# Patient Record
Sex: MALE | HISPANIC OR LATINO | Employment: FULL TIME
[De-identification: names, ages, dates, MRNs, and addresses within clinical notes are randomized per-mention and may not be internally consistent; named-entity substitution may affect disease eponyms.]

---

## 2019-09-17 ENCOUNTER — RECORDS - HEALTHEAST (OUTPATIENT)
Dept: ADMINISTRATIVE | Facility: OTHER | Age: 49
End: 2019-09-17

## 2019-09-17 LAB
CHOLEST SERPL-MCNC: 134 MG/DL
HDLC SERPL-MCNC: 39 MG/DL
LDLC SERPL CALC-MCNC: 76 MG/DL
TRIGLYCERIDES (HISTORICAL CONVERSION): 103 MG/DL

## 2020-02-05 ENCOUNTER — RECORDS - HEALTHEAST (OUTPATIENT)
Dept: ADMINISTRATIVE | Facility: OTHER | Age: 50
End: 2020-02-05

## 2020-02-05 ENCOUNTER — AMBULATORY - HEALTHEAST (OUTPATIENT)
Dept: ADMINISTRATIVE | Facility: CLINIC | Age: 50
End: 2020-02-05

## 2020-02-05 DIAGNOSIS — G62.9 PERIPHERAL NEUROPATHY: ICD-10-CM

## 2020-02-10 ENCOUNTER — RECORDS - HEALTHEAST (OUTPATIENT)
Dept: HEALTH INFORMATION MANAGEMENT | Facility: CLINIC | Age: 50
End: 2020-02-10

## 2020-02-19 ENCOUNTER — OFFICE VISIT - HEALTHEAST (OUTPATIENT)
Dept: PODIATRY | Facility: CLINIC | Age: 50
End: 2020-02-19

## 2020-02-19 DIAGNOSIS — G58.9 COMPRESSION NEUROPATHY: ICD-10-CM

## 2020-02-19 DIAGNOSIS — G57.53 TARSAL TUNNEL SYNDROME OF BOTH LOWER EXTREMITIES: ICD-10-CM

## 2020-02-19 RX ORDER — ATORVASTATIN CALCIUM 10 MG/1
10 TABLET, FILM COATED ORAL AT BEDTIME
Status: SHIPPED | COMMUNITY
Start: 2020-02-19 | End: 2024-08-10

## 2020-02-26 ENCOUNTER — HOSPITAL ENCOUNTER (OUTPATIENT)
Dept: PHYSICAL MEDICINE AND REHAB | Facility: CLINIC | Age: 50
Discharge: HOME | End: 2020-02-26
Attending: PODIATRIST

## 2020-02-26 DIAGNOSIS — G57.53 TARSAL TUNNEL SYNDROME OF BOTH LOWER EXTREMITIES: ICD-10-CM

## 2020-02-26 DIAGNOSIS — G58.9 COMPRESSION NEUROPATHY: ICD-10-CM

## 2020-02-27 ENCOUNTER — RECORDS - HEALTHEAST (OUTPATIENT)
Dept: ADMINISTRATIVE | Facility: OTHER | Age: 50
End: 2020-02-27

## 2021-05-27 VITALS — TEMPERATURE: 98.6 F | DIASTOLIC BLOOD PRESSURE: 81 MMHG | SYSTOLIC BLOOD PRESSURE: 133 MMHG | HEART RATE: 68 BPM

## 2021-06-06 NOTE — PROGRESS NOTES
Patient presents at the request of Dr. Dante Robbins for bilateral lower extremity EMG.  He has bilateral foot pain right equal to left with numbness and tingling over the top more than the bottom of the feet.  His feet are hot/warm.  This is been present for 3 years.  3-year history of diabetes mellitus.    EMG/NCS  results: Please see scanned full report.    Comment NCS: Abnormal study  1.  Absent bilateral lower extremity SNAPs.  2.  Borderline amplitudes bilateral lower extremity CMAPs with generalized slowed conduction velocities.  Normal distal latencies.  3.  Normal right radial SNAP.  4.  Normal right ulnar CMAP distal latencies and amplitudes with generalized borderline conduction velocity.    Comment EMG: Abnormal study  1.  Mild increased insertional activity bilateral medial gastrocnemius muscles without sustained positive waves or fibrillation potentials.  Remainder bilateral lower extremity needle EMG normal    Interpretation: Abnormal study: There is electrodiagnostic evidence of:    1.  Length dependent sensorimotor polyneuropathy, predominantly axonal.  This appears to mildly affect the upper extremity motor conduction velocities and more significantly the lower extremity motor conduction velocities and sensory fibers.   These findings would be consistent with his history of diabetes mellitus.    2. There is no electrodiagnostic evidence of lumbosacral radiculopathy, lumbosacral plexopathy, or focal neuropathy in the bilateral lower extremities.  Specifically, there is no confirmatory electrodiagnostic evidence of peroneal neuropathy at the fibular neck or tibial neuropathy at the tarsal tunnel in the bilateral lower extremities.    The testing was completed in its entirety by the physician.      It was our pleasure caring for your patient today, if there any questions or concerns please do not hesitate to contact us.

## 2021-06-06 NOTE — PROGRESS NOTES
FOOT AND ANKLE SURGERY/PODIATRY CONSULT NOTE         ASSESSMENT:   Tarsal tunnel bilaterally  Compression neuropathy both lower extremities      TREATMENT:  The patient has been referred to neurology for EMGs and nerve conduction studies.  I informed the patient he may require surgical intervention in an attempt to alleviate his symptoms.  I recommended a tarsal tunnel release with nerve decompression and external neurolysis of the medial and lateral plantar nerves as well as the calcaneal nerve.  I have also recommended nerve decompression with external neurolysis of the deep peroneal nerve of both feet.  I informed the patient I estimate that he would have an 80% chance of significant reduction of his symptoms if not complete relief of his symptoms.  He was told that these procedures are performed on an outpatient basis under general anesthesia.  He was told that 1 foot would be done at a time.  After 3 weeks of the sutures will be removed and he would be able to return to normal shoe gear.  He was told the procedure takes approximately 45 minutes to perform.  He would then be discharged weightbearing.  The patient is to return to the clinic in 1 week to discuss the findings of his EMG.        HPI: I was asked to see Petar Fermin today for evaluation and treatment of severe pain involving both feet.  The patient indicated that for the past 3 years he has had severe burning and shooting pains involving both feet.  The patient stated that he has to place his feet in cold water in an attempt to alleviate his burning.  He also has tingling on the bottom of both feet which is exacerbated at night.  He has difficulty sleeping.  He has tried gabapentin and Lyrica with very little relief.  He denies any trauma to his lower back.  He he has not had any trauma to his lower extremities.  There are no factors which relieve his pain.  He is extremely frustrated at the inability to alleviate the severe burning and shooting  pains.      History reviewed. No pertinent past medical history.    History reviewed. No pertinent surgical history.    No Known Allergies      Current Outpatient Medications:      atorvastatin (LIPITOR) 10 MG tablet, Take 10 mg by mouth at bedtime., Disp: , Rfl:      metFORMIN (GLUCOPHAGE) 1000 MG tablet, Take 1,000 mg by mouth 2 (two) times a day with meals., Disp: , Rfl:     History reviewed. No pertinent family history.    Social History     Socioeconomic History     Marital status:      Spouse name: Not on file     Number of children: Not on file     Years of education: Not on file     Highest education level: Not on file   Occupational History     Not on file   Social Needs     Financial resource strain: Not on file     Food insecurity:     Worry: Not on file     Inability: Not on file     Transportation needs:     Medical: Not on file     Non-medical: Not on file   Tobacco Use     Smoking status: Never Smoker     Smokeless tobacco: Never Used   Substance and Sexual Activity     Alcohol use: Not on file     Drug use: Not on file     Sexual activity: Not on file   Lifestyle     Physical activity:     Days per week: Not on file     Minutes per session: Not on file     Stress: Not on file   Relationships     Social connections:     Talks on phone: Not on file     Gets together: Not on file     Attends Church service: Not on file     Active member of club or organization: Not on file     Attends meetings of clubs or organizations: Not on file     Relationship status: Not on file     Intimate partner violence:     Fear of current or ex partner: Not on file     Emotionally abused: Not on file     Physically abused: Not on file     Forced sexual activity: Not on file   Other Topics Concern     Not on file   Social History Narrative     Not on file       Review of Systems - Patient denies fever, chills, rash, wound, stiffness, limping, numbness, weakness, heart burn, blood in stool, chest pain with activity,  calf pain when walking, shortness of breath with activity, chronic cough, easy bleeding/bruising, swelling of ankles, excessive thirst, fatigue, depression, anxiety.  Patient admits to severe burning and tingling and some numbness involving both feet.      OBJECTIVE:  Appearance: alert, well appearing, and in no distress.    Vitals:    02/19/20 0853   BP: 133/81   Pulse: 68   Temp: 98.6  F (37  C)       BMI= There is no height or weight on file to calculate BMI.    General appearance: Patient is alert and fully cooperative with history & exam.  No sign of distress is noted during the visit.  Psychiatric: Affect is pleasant & appropriate.  Patient appears motivated to improve health.  Respiratory: Breathing is regular & unlabored while sitting.  HEENT: Hearing is intact to spoken word.  Speech is clear.  No gross evidence of visual impairment that would impact ambulation.    Vascular: Dorsalis pedis and posterior tibial pulses are palpable. There is no pedal hair growth bilaterally.  CFT < 3 sec from anterior tibial surface to distal digits bilaterally. There is no appreciable edema noted.  Dermatologic: Turgor and texture are within normal limits. No coloration or temperature changes. No primary or secondary lesions noted.  Neurologic: All epicritic and proprioceptive sensations are grossly diminished bilaterally.  There is a very positive Tinel sign when percussing the tarsal tunnel and deep peroneal nerve bilaterally.  Musculoskeletal: All active and passive ankle, subtalar, midtarsal, and 1st MPJ range of motion are grossly intact without pain or crepitus, with the exception of none. Manual muscle strength is within normal limits bilaterally. All dorsiflexors, plantarflexors, invertors, evertors are intact bilaterally. Tenderness present to plantar aspect both feet on palpation.  No tenderness to feet or ankles with range of motion. Calf is soft/non-tender without warmth/induration    Imaging:     No results  found.             Dante Robbins; SILVIA  [facility] Foot & Ankle Surgery/Podiatry

## 2021-06-06 NOTE — PATIENT INSTRUCTIONS - HE
Thank you for choosing the [facility] for your EMG testing.    The ordering provider will receive your final EMG results within the next few days.  Please follow up with your provider for the results and further treatment recommendations.

## 2024-08-10 ENCOUNTER — APPOINTMENT (OUTPATIENT)
Dept: MRI IMAGING | Facility: HOSPITAL | Age: 54
DRG: 629 | End: 2024-08-10
Attending: HOSPITALIST

## 2024-08-10 ENCOUNTER — APPOINTMENT (OUTPATIENT)
Dept: RADIOLOGY | Facility: HOSPITAL | Age: 54
DRG: 629 | End: 2024-08-10
Attending: EMERGENCY MEDICINE

## 2024-08-10 ENCOUNTER — HOSPITAL ENCOUNTER (INPATIENT)
Facility: HOSPITAL | Age: 54
LOS: 5 days | Discharge: HOME | DRG: 629 | End: 2024-08-15
Attending: EMERGENCY MEDICINE | Admitting: HOSPITALIST

## 2024-08-10 ENCOUNTER — OFFICE VISIT (OUTPATIENT)
Dept: FAMILY MEDICINE | Facility: CLINIC | Age: 54
End: 2024-08-10

## 2024-08-10 VITALS
HEART RATE: 70 BPM | OXYGEN SATURATION: 99 % | TEMPERATURE: 98.2 F | SYSTOLIC BLOOD PRESSURE: 135 MMHG | RESPIRATION RATE: 18 BRPM | WEIGHT: 168.5 LBS | DIASTOLIC BLOOD PRESSURE: 82 MMHG

## 2024-08-10 DIAGNOSIS — L02.612 ABSCESS, TOE, LEFT: Primary | ICD-10-CM

## 2024-08-10 DIAGNOSIS — Z86.39 HISTORY OF DIABETES MELLITUS: ICD-10-CM

## 2024-08-10 DIAGNOSIS — L08.9 TOE INFECTION: ICD-10-CM

## 2024-08-10 DIAGNOSIS — N52.1 ERECTILE DYSFUNCTION DUE TO DISEASES CLASSIFIED ELSEWHERE: ICD-10-CM

## 2024-08-10 DIAGNOSIS — G44.86 CERVICOGENIC HEADACHE: ICD-10-CM

## 2024-08-10 DIAGNOSIS — E11.65 TYPE 2 DIABETES MELLITUS WITH HYPERGLYCEMIA, WITHOUT LONG-TERM CURRENT USE OF INSULIN (H): ICD-10-CM

## 2024-08-10 DIAGNOSIS — M51.26 DISPLACEMENT OF LUMBAR INTERVERTEBRAL DISC WITHOUT MYELOPATHY: ICD-10-CM

## 2024-08-10 DIAGNOSIS — M86.9 TOE OSTEOMYELITIS (H): Primary | ICD-10-CM

## 2024-08-10 DIAGNOSIS — L03.116 CELLULITIS OF LEFT FOOT: ICD-10-CM

## 2024-08-10 LAB
ANION GAP SERPL CALCULATED.3IONS-SCNC: 10 MMOL/L (ref 7–15)
BASOPHILS # BLD AUTO: 0 10E3/UL (ref 0–0.2)
BASOPHILS NFR BLD AUTO: 0 %
BUN SERPL-MCNC: 13.8 MG/DL (ref 6–20)
CALCIUM SERPL-MCNC: 8.6 MG/DL (ref 8.8–10.4)
CHLORIDE SERPL-SCNC: 98 MMOL/L (ref 98–107)
CREAT SERPL-MCNC: 0.62 MG/DL (ref 0.67–1.17)
CRP SERPL-MCNC: 53 MG/L
EGFRCR SERPLBLD CKD-EPI 2021: >90 ML/MIN/1.73M2
EOSINOPHIL # BLD AUTO: 0.1 10E3/UL (ref 0–0.7)
EOSINOPHIL NFR BLD AUTO: 2 %
ERYTHROCYTE [DISTWIDTH] IN BLOOD BY AUTOMATED COUNT: 12.5 % (ref 10–15)
GLUCOSE BLDC GLUCOMTR-MCNC: 288 MG/DL (ref 70–99)
GLUCOSE BLDC GLUCOMTR-MCNC: 293 MG/DL (ref 70–99)
GLUCOSE BLDC GLUCOMTR-MCNC: 364 MG/DL (ref 70–99)
GLUCOSE SERPL-MCNC: 341 MG/DL (ref 70–99)
HBA1C MFR BLD: 13.3 %
HCO3 SERPL-SCNC: 28 MMOL/L (ref 22–29)
HCT VFR BLD AUTO: 41.5 % (ref 40–53)
HGB BLD-MCNC: 13.5 G/DL (ref 13.3–17.7)
HOLD SPECIMEN: NORMAL
HOLD SPECIMEN: NORMAL
IMM GRANULOCYTES # BLD: 0 10E3/UL
IMM GRANULOCYTES NFR BLD: 0 %
LACTATE SERPL-SCNC: 0.9 MMOL/L (ref 0.7–2)
LYMPHOCYTES # BLD AUTO: 1.3 10E3/UL (ref 0.8–5.3)
LYMPHOCYTES NFR BLD AUTO: 27 %
MCH RBC QN AUTO: 29.9 PG (ref 26.5–33)
MCHC RBC AUTO-ENTMCNC: 32.5 G/DL (ref 31.5–36.5)
MCV RBC AUTO: 92 FL (ref 78–100)
MONOCYTES # BLD AUTO: 0.9 10E3/UL (ref 0–1.3)
MONOCYTES NFR BLD AUTO: 18 %
NEUTROPHILS # BLD AUTO: 2.6 10E3/UL (ref 1.6–8.3)
NEUTROPHILS NFR BLD AUTO: 54 %
NRBC # BLD AUTO: 0 10E3/UL
NRBC BLD AUTO-RTO: 0 /100
PLATELET # BLD AUTO: 259 10E3/UL (ref 150–450)
POTASSIUM SERPL-SCNC: 4.5 MMOL/L (ref 3.4–5.3)
RBC # BLD AUTO: 4.51 10E6/UL (ref 4.4–5.9)
SODIUM SERPL-SCNC: 136 MMOL/L (ref 135–145)
WBC # BLD AUTO: 4.9 10E3/UL (ref 4–11)

## 2024-08-10 PROCEDURE — 99285 EMERGENCY DEPT VISIT HI MDM: CPT

## 2024-08-10 PROCEDURE — 255N000002 HC RX 255 OP 636: Performed by: HOSPITALIST

## 2024-08-10 PROCEDURE — 250N000011 HC RX IP 250 OP 636: Performed by: HOSPITALIST

## 2024-08-10 PROCEDURE — 83036 HEMOGLOBIN GLYCOSYLATED A1C: CPT | Performed by: HOSPITALIST

## 2024-08-10 PROCEDURE — 73630 X-RAY EXAM OF FOOT: CPT | Mod: LT

## 2024-08-10 PROCEDURE — 96374 THER/PROPH/DIAG INJ IV PUSH: CPT

## 2024-08-10 PROCEDURE — A9585 GADOBUTROL INJECTION: HCPCS | Performed by: HOSPITALIST

## 2024-08-10 PROCEDURE — 86140 C-REACTIVE PROTEIN: CPT | Performed by: EMERGENCY MEDICINE

## 2024-08-10 PROCEDURE — 250N000012 HC RX MED GY IP 250 OP 636 PS 637: Performed by: HOSPITALIST

## 2024-08-10 PROCEDURE — 258N000003 HC RX IP 258 OP 636

## 2024-08-10 PROCEDURE — 250N000013 HC RX MED GY IP 250 OP 250 PS 637: Performed by: HOSPITALIST

## 2024-08-10 PROCEDURE — 90471 IMMUNIZATION ADMIN: CPT | Performed by: EMERGENCY MEDICINE

## 2024-08-10 PROCEDURE — 80048 BASIC METABOLIC PNL TOTAL CA: CPT | Performed by: EMERGENCY MEDICINE

## 2024-08-10 PROCEDURE — 90715 TDAP VACCINE 7 YRS/> IM: CPT | Performed by: EMERGENCY MEDICINE

## 2024-08-10 PROCEDURE — 250N000011 HC RX IP 250 OP 636: Performed by: EMERGENCY MEDICINE

## 2024-08-10 PROCEDURE — 250N000011 HC RX IP 250 OP 636

## 2024-08-10 PROCEDURE — 85025 COMPLETE CBC W/AUTO DIFF WBC: CPT | Performed by: EMERGENCY MEDICINE

## 2024-08-10 PROCEDURE — 73720 MRI LWR EXTREMITY W/O&W/DYE: CPT | Mod: LT

## 2024-08-10 PROCEDURE — 36415 COLL VENOUS BLD VENIPUNCTURE: CPT | Performed by: EMERGENCY MEDICINE

## 2024-08-10 PROCEDURE — 87205 SMEAR GRAM STAIN: CPT | Performed by: EMERGENCY MEDICINE

## 2024-08-10 PROCEDURE — 99222 1ST HOSP IP/OBS MODERATE 55: CPT | Performed by: HOSPITALIST

## 2024-08-10 PROCEDURE — 87040 BLOOD CULTURE FOR BACTERIA: CPT | Performed by: EMERGENCY MEDICINE

## 2024-08-10 PROCEDURE — 99204 OFFICE O/P NEW MOD 45 MIN: CPT | Performed by: STUDENT IN AN ORGANIZED HEALTH CARE EDUCATION/TRAINING PROGRAM

## 2024-08-10 PROCEDURE — 83605 ASSAY OF LACTIC ACID: CPT | Performed by: EMERGENCY MEDICINE

## 2024-08-10 PROCEDURE — 120N000001 HC R&B MED SURG/OB

## 2024-08-10 RX ORDER — IBUPROFEN 200 MG
400 TABLET ORAL EVERY 6 HOURS PRN
COMMUNITY

## 2024-08-10 RX ORDER — CEFAZOLIN SODIUM 1 G/50ML
1250 SOLUTION INTRAVENOUS EVERY 12 HOURS
Status: DISCONTINUED | OUTPATIENT
Start: 2024-08-11 | End: 2024-08-12 | Stop reason: DRUGHIGH

## 2024-08-10 RX ORDER — CEFAZOLIN SODIUM 1 G/50ML
1750 SOLUTION INTRAVENOUS ONCE
Status: COMPLETED | OUTPATIENT
Start: 2024-08-10 | End: 2024-08-11

## 2024-08-10 RX ORDER — AMOXICILLIN 250 MG
1 CAPSULE ORAL 2 TIMES DAILY PRN
Status: DISCONTINUED | OUTPATIENT
Start: 2024-08-10 | End: 2024-08-15 | Stop reason: HOSPADM

## 2024-08-10 RX ORDER — CALCIUM CARBONATE 500 MG/1
1000 TABLET, CHEWABLE ORAL 4 TIMES DAILY PRN
Status: DISCONTINUED | OUTPATIENT
Start: 2024-08-10 | End: 2024-08-15 | Stop reason: HOSPADM

## 2024-08-10 RX ORDER — METFORMIN HCL 500 MG
2000 TABLET, EXTENDED RELEASE 24 HR ORAL
COMMUNITY
Start: 2023-11-09

## 2024-08-10 RX ORDER — GADOBUTROL 604.72 MG/ML
7 INJECTION INTRAVENOUS ONCE
Status: COMPLETED | OUTPATIENT
Start: 2024-08-10 | End: 2024-08-10

## 2024-08-10 RX ORDER — HYDROMORPHONE HYDROCHLORIDE 2 MG/1
2 TABLET ORAL EVERY 4 HOURS PRN
Status: DISCONTINUED | OUTPATIENT
Start: 2024-08-10 | End: 2024-08-15 | Stop reason: HOSPADM

## 2024-08-10 RX ORDER — ACETAMINOPHEN 650 MG/1
650 SUPPOSITORY RECTAL EVERY 4 HOURS PRN
Status: DISCONTINUED | OUTPATIENT
Start: 2024-08-10 | End: 2024-08-15 | Stop reason: HOSPADM

## 2024-08-10 RX ORDER — NALOXONE HYDROCHLORIDE 0.4 MG/ML
0.2 INJECTION, SOLUTION INTRAMUSCULAR; INTRAVENOUS; SUBCUTANEOUS
Status: DISCONTINUED | OUTPATIENT
Start: 2024-08-10 | End: 2024-08-15 | Stop reason: HOSPADM

## 2024-08-10 RX ORDER — NALOXONE HYDROCHLORIDE 0.4 MG/ML
0.4 INJECTION, SOLUTION INTRAMUSCULAR; INTRAVENOUS; SUBCUTANEOUS
Status: DISCONTINUED | OUTPATIENT
Start: 2024-08-10 | End: 2024-08-15 | Stop reason: HOSPADM

## 2024-08-10 RX ORDER — PIPERACILLIN SODIUM, TAZOBACTAM SODIUM 3; .375 G/15ML; G/15ML
3.38 INJECTION, POWDER, LYOPHILIZED, FOR SOLUTION INTRAVENOUS EVERY 8 HOURS
Status: DISCONTINUED | OUTPATIENT
Start: 2024-08-10 | End: 2024-08-13

## 2024-08-10 RX ORDER — NICOTINE POLACRILEX 4 MG
15-30 LOZENGE BUCCAL
Status: DISCONTINUED | OUTPATIENT
Start: 2024-08-10 | End: 2024-08-15 | Stop reason: HOSPADM

## 2024-08-10 RX ORDER — ACETAMINOPHEN 325 MG/1
650 TABLET ORAL EVERY 4 HOURS PRN
Status: DISCONTINUED | OUTPATIENT
Start: 2024-08-10 | End: 2024-08-15 | Stop reason: HOSPADM

## 2024-08-10 RX ORDER — LIDOCAINE 40 MG/G
CREAM TOPICAL
Status: DISCONTINUED | OUTPATIENT
Start: 2024-08-10 | End: 2024-08-15 | Stop reason: HOSPADM

## 2024-08-10 RX ORDER — POLYETHYLENE GLYCOL 3350 17 G/17G
17 POWDER, FOR SOLUTION ORAL 2 TIMES DAILY PRN
Status: DISCONTINUED | OUTPATIENT
Start: 2024-08-10 | End: 2024-08-15 | Stop reason: HOSPADM

## 2024-08-10 RX ORDER — ATORVASTATIN CALCIUM 10 MG/1
10 TABLET, FILM COATED ORAL EVERY EVENING
Status: DISCONTINUED | OUTPATIENT
Start: 2024-08-10 | End: 2024-08-15 | Stop reason: HOSPADM

## 2024-08-10 RX ORDER — AMOXICILLIN 250 MG
2 CAPSULE ORAL 2 TIMES DAILY PRN
Status: DISCONTINUED | OUTPATIENT
Start: 2024-08-10 | End: 2024-08-15 | Stop reason: HOSPADM

## 2024-08-10 RX ORDER — DEXTROSE MONOHYDRATE 25 G/50ML
25-50 INJECTION, SOLUTION INTRAVENOUS
Status: DISCONTINUED | OUTPATIENT
Start: 2024-08-10 | End: 2024-08-15 | Stop reason: HOSPADM

## 2024-08-10 RX ORDER — GABAPENTIN 100 MG/1
100 CAPSULE ORAL 3 TIMES DAILY
Status: DISCONTINUED | OUTPATIENT
Start: 2024-08-10 | End: 2024-08-15 | Stop reason: HOSPADM

## 2024-08-10 RX ORDER — PIPERACILLIN SODIUM, TAZOBACTAM SODIUM 3; .375 G/15ML; G/15ML
3.38 INJECTION, POWDER, LYOPHILIZED, FOR SOLUTION INTRAVENOUS ONCE
Status: COMPLETED | OUTPATIENT
Start: 2024-08-10 | End: 2024-08-10

## 2024-08-10 RX ADMIN — GABAPENTIN 100 MG: 100 CAPSULE ORAL at 14:09

## 2024-08-10 RX ADMIN — CLOSTRIDIUM TETANI TOXOID ANTIGEN (FORMALDEHYDE INACTIVATED), CORYNEBACTERIUM DIPHTHERIAE TOXOID ANTIGEN (FORMALDEHYDE INACTIVATED), BORDETELLA PERTUSSIS TOXOID ANTIGEN (GLUTARALDEHYDE INACTIVATED), BORDETELLA PERTUSSIS FILAMENTOUS HEMAGGLUTININ ANTIGEN (FORMALDEHYDE INACTIVATED), BORDETELLA PERTUSSIS PERTACTIN ANTIGEN, AND BORDETELLA PERTUSSIS FIMBRIAE 2/3 ANTIGEN 0.5 ML: 5; 2; 2.5; 5; 3; 5 INJECTION, SUSPENSION INTRAMUSCULAR at 13:02

## 2024-08-10 RX ADMIN — PIPERACILLIN AND TAZOBACTAM 3.38 G: 3; .375 INJECTION, POWDER, FOR SOLUTION INTRAVENOUS at 18:27

## 2024-08-10 RX ADMIN — ATORVASTATIN CALCIUM 10 MG: 10 TABLET, FILM COATED ORAL at 21:13

## 2024-08-10 RX ADMIN — GADOBUTROL 7 ML: 604.72 INJECTION INTRAVENOUS at 18:05

## 2024-08-10 RX ADMIN — PIPERACILLIN AND TAZOBACTAM 3.38 G: 3; .375 INJECTION, POWDER, FOR SOLUTION INTRAVENOUS at 13:02

## 2024-08-10 RX ADMIN — INSULIN ASPART 5 UNITS: 100 INJECTION, SOLUTION INTRAVENOUS; SUBCUTANEOUS at 18:24

## 2024-08-10 RX ADMIN — INSULIN ASPART 4 UNITS: 100 INJECTION, SOLUTION INTRAVENOUS; SUBCUTANEOUS at 14:16

## 2024-08-10 RX ADMIN — SODIUM CHLORIDE 1750 MG: 9 INJECTION, SOLUTION INTRAVENOUS at 22:07

## 2024-08-10 RX ADMIN — GABAPENTIN 100 MG: 100 CAPSULE ORAL at 21:13

## 2024-08-10 ASSESSMENT — ENCOUNTER SYMPTOMS
COLOR CHANGE: 1
WOUND: 1

## 2024-08-10 ASSESSMENT — ACTIVITIES OF DAILY LIVING (ADL)
ADLS_ACUITY_SCORE: 35
ADLS_ACUITY_SCORE: 20
ADLS_ACUITY_SCORE: 35
ADLS_ACUITY_SCORE: 20
ADLS_ACUITY_SCORE: 20
ADLS_ACUITY_SCORE: 35
ADLS_ACUITY_SCORE: 35
DEPENDENT_IADLS:: INDEPENDENT
ADLS_ACUITY_SCORE: 20
ADLS_ACUITY_SCORE: 20
ADLS_ACUITY_SCORE: 35
ADLS_ACUITY_SCORE: 35

## 2024-08-10 ASSESSMENT — COLUMBIA-SUICIDE SEVERITY RATING SCALE - C-SSRS
1. IN THE PAST MONTH, HAVE YOU WISHED YOU WERE DEAD OR WISHED YOU COULD GO TO SLEEP AND NOT WAKE UP?: NO
6. HAVE YOU EVER DONE ANYTHING, STARTED TO DO ANYTHING, OR PREPARED TO DO ANYTHING TO END YOUR LIFE?: NO
2. HAVE YOU ACTUALLY HAD ANY THOUGHTS OF KILLING YOURSELF IN THE PAST MONTH?: NO

## 2024-08-10 NOTE — H&P
"[facility]    History and Physical - Hospitalist Service       Date of Admission:  8/10/2024    Assessment & Plan      Petar Fermin is a 54 year old male admitted on 8/10/2024. He is being admitted for left foot cellulitis.        #Left foot cellulitis, diabetic foot infection  -Duration of symptoms unclear as patient does not recall initial injury, infectious symptoms seem to have acutely worsened 3 days prior to admission when patient manipulated his wound  -Mild fevers but otherwise nontoxic on admission  -Left foot MRI ordered  -Podiatry consult  -Continue with Zosyn, consider adding Vanco if worsening  -Wound care consult  -Follow-up cultures    #Type 2 diabetes mellitus complicated by hyperglycemia and peripheral neuropathy  -A1c pending, blood sugar in the 300s on admission likely from infectious stress  -Routinely takes metformin, holding  -Moderate correctional scale ordered, titrate as needed    #Hyperlipidemia  -Resume usual atorvastatin            Diet: Combination Diet Regular Diet Adult  DVT Prophylaxis: Pneumatic Compression Devices  Martinez Catheter: Not present  Lines: None     Cardiac Monitoring: None  Code Status: Full Code    Clinically Significant Risk Factors Present on Admission                          # Overweight: Estimated body mass index is 27.96 kg/m  as calculated from the following:    Height as of this encounter: 1.651 m (5' 5\").    Weight as of this encounter: 76.2 kg (168 lb).                    Disposition Plan     Medically Ready for Discharge: Anticipated in 2-4 Days           Neel Hedrick MD  Hospitalist Service  [facility]  Securely message with [employer] (more info)  Text page via [address] Paging/Directory     ______________________________________________________________________    Chief Complaint   Foot redness    History is obtained from the patient  History is obtained from patient's son    History of Present Illness "   Petar Fermin is a 54 year old male who presented to the ER for foot redness.  He states 3 days ago he noticed some sort of wire in his left great toe, also noted some drainage, patient describes pulling this out.  Subsequently he noted increasing redness going up his foot, along with tactile fevers prompting presentation.  Denies having had previous bouts of infections like this.  Denies any belly pain, nausea, shortness of breath or chest pain.  States he has chronic numbness in his feet.    Past Medical History    Past Medical History:   Diagnosis Date    DM2 (diabetes mellitus, type 2) (H)        Past Surgical History   No past surgical history on file.    Prior to Admission Medications   Prior to Admission Medications   Prescriptions Last Dose Informant Patient Reported? Taking?   ibuprofen (ADVIL/MOTRIN) 200 MG tablet 8/10/2024 at am  Yes Yes   Sig: Take 400 mg by mouth every 6 hours as needed for pain   metFORMIN (GLUCOPHAGE XR) 500 MG 24 hr tablet 8/9/2024 at pm  Yes Yes   Sig: Take 2,000 mg by mouth daily (with dinner)      Facility-Administered Medications: None        Review of Systems    12 systems negative other than HPI.        Social History   I have reviewed this patient's social history and updated it with pertinent information if needed.  Social History     Tobacco Use    Smoking status: Never    Smokeless tobacco: Never         Family History           Allergies   No Known Allergies     Physical Exam   Vital Signs: Temp: 98.1  F (36.7  C)   BP: (!) 158/72 Pulse: 67   Resp: 18 SpO2: 99 % O2 Device: None (Room air)    Weight: 168 lbs 0 oz    Well-appearing, no acute distress, poor dentition, membranes moist, heart rate regular, lungs clear, abdomen soft, trace left lower extremity edema, left foot with demarcated area of erythema to forefoot, quarter sized ulceration at the tip of his left great toe without obvious purulence, no joint pains on palpation, normal affect and mood    Medical  Decision Making       64 MINUTES SPENT BY ME on the date of service doing chart review, history, exam, documentation & further activities per the note.  NOTE(S)/MEDICAL RECORDS REVIEWED over the past 24 hours: Vitals, labs, new imaging, documentation and medication administrations       Data     I have personally reviewed the following data over the past 24 hrs:    4.9  \   13.5   / 259     136 98 13.8 /  341 (H)   4.5 28 0.62 (L) \     Procal: N/A CRP: 53.00 (H) Lactic Acid: 0.9         Imaging results reviewed over the past 24 hrs:   Recent Results (from the past 24 hour(s))   Foot XR, G/E 3 views, left    Narrative    EXAM: XR FOOT LEFT G/E 3 VIEWS  LOCATION: [facility]  DATE: 8/10/2024    INDICATION: Swelling and redness. Evaluate for retained foreign body in the left big toe.  COMPARISON: None.      Impression    IMPRESSION: There is no evidence of an acute fracture with attention to the great toe. There is some soft tissue swelling and irregularity however there is no discrete radiopaque foreign body identified. No significant joint space narrowing. No cortical   destructive change to suggest osteomyelitis.

## 2024-08-10 NOTE — ED PROVIDER NOTES
EMERGENCY DEPARTMENT ENCOUNTER      NAME: Petar Fermin  AGE: 54 year old male  YOB: 1970  MRN: [id number]  EVALUATION DATE & TIME: 8/10/2024 12:13 PM    PCP: Karen Jimenez    ED PROVIDER: Breann Salomon M.D.      CHIEF COMPLAINT     Chief Complaint   Patient presents with    Left toe infection         FINAL IMPRESSION:     1. Cellulitis of left foot    2. Toe infection    3. History of diabetes mellitus          MEDICAL DECISION MAKING:       Pertinent Labs & Imaging studies reviewed. (See chart for details)    54 year old male presents to the Emergency Department for evaluation of foot infection    History  Supplemental history from urgent care and son  External Record(s) review as documented below    Exam  Alert.  Cooperative and pleasant.  Swelling of the dorsum of the left foot with purulent discharge from the left big toe.    Differential Diagnosis include but not limited to abscess osteo cellulitis among others.      Vital Signs: Reviewed  EKG: None  Imaging: I independently interpreted foot x-ray soft tissue defect.  No foreign body.Formal read by radiologist.  Home meds: Reviewed  ED meds: Zosyn  Fluids: Normal Saline  Labs:  K 4.5  Cr 0.62  Wbc 4.9  Hgb 13.5  platelets 259    Clinical Impression and Decision Making    54-year-old male diabetic neuropathy presents here from clinic for foot infection.  Few days ago he noticed that there was a wire on his big toe he removed that there was pus.  He denies any pain denies fevers chills.    Exam purulent discharge from the left big toe and erythema of the dorsum of the foot.    Labs revealed normal white blood cell count normal lactic acid.  Gram stain pending.  Blood cultures done I do not suspect endocarditis therefore only 1 set was done.  Patient started on broad-spectrum antibiotics.    Patient denied any pain.    Spoke with hospitalist who accepts patient will order a formal podiatry consult.    Admitted in stable condition with  guarded diagnosis.    In addition to the work out documented, I considered the following work up incision and drainage purulent discharge already draining his diabetic will need formal surgical evaluation.           Medical Decision Making    History:  Supplemental history from: Documented in chart  External Record(s) reviewed: Documented in chart    Work Up:  Chart documentation includes differential considered and any EKGs or imaging independently interpreted by provider, where specified.  In additional to work up documented, I considered the following work up: Documented in chart, if applicable.    External consultation:  Discussion of management with another provider: Documented in chart, if applicable    Complicating factors:  Care impacted by chronic illness: Diabetes  Care affected by social determinants of health: N/A    Disposition considerations: Admit.      Review of External Records  Hospital/Clinic: [facility]  Date: 8/10/24  Type 2 diabetes      External Consultation  Dr. Hedrick, Hospitalist      ED COURSE     12:30 PM I met with the patient and his son for the initial interview and physical examination. Discussed plan for treatment and workup in the ED.    1:08 PM I discussed the case with hospitalist, Dr Hedrick, who accepts the patient for admission.  1:41 PM I rechecked and updated the patient and his son on plan for admission.    At the conclusion of the encounter I discussed the results of all of the tests and the disposition. The questions were answered. The patient and his son acknowledged understanding and was agreeable with the care plan.         MEDICATIONS GIVEN IN THE EMERGENCY:     Medications   lidocaine 1 % 0.1-1 mL (has no administration in time range)   lidocaine (LMX4) cream (has no administration in time range)   sodium chloride (PF) 0.9% PF flush 3 mL (3 mLs Intracatheter $Given 8/10/24 1410)   sodium chloride (PF) 0.9% PF flush 3 mL (has no administration in time range)    senna-docusate (SENOKOT-S/PERICOLACE) 8.6-50 MG per tablet 1 tablet (has no administration in time range)     Or   senna-docusate (SENOKOT-S/PERICOLACE) 8.6-50 MG per tablet 2 tablet (has no administration in time range)   calcium carbonate (TUMS) chewable tablet 1,000 mg (has no administration in time range)   acetaminophen (TYLENOL) tablet 650 mg (has no administration in time range)     Or   acetaminophen (TYLENOL) Suppository 650 mg (has no administration in time range)   gabapentin (NEURONTIN) capsule 100 mg (100 mg Oral $Given 8/10/24 1409)   HYDROmorphone (DILAUDID) half-tab 1 mg (has no administration in time range)   HYDROmorphone (DILAUDID) tablet 2 mg (has no administration in time range)   polyethylene glycol (MIRALAX) Packet 17 g (has no administration in time range)   glucose gel 15-30 g (has no administration in time range)     Or   dextrose 50 % injection 25-50 mL (has no administration in time range)     Or   glucagon injection 1 mg (has no administration in time range)   insulin aspart (NovoLOG) injection (RAPID ACTING) (4 Units Subcutaneous $Given 8/10/24 1416)   insulin aspart (NovoLOG) injection (RAPID ACTING) (has no administration in time range)   naloxone (NARCAN) injection 0.2 mg (has no administration in time range)     Or   naloxone (NARCAN) injection 0.4 mg (has no administration in time range)     Or   naloxone (NARCAN) injection 0.2 mg (has no administration in time range)     Or   naloxone (NARCAN) injection 0.4 mg (has no administration in time range)   piperacillin-tazobactam (ZOSYN) 3.375 g vial to attach to  mL bag (has no administration in time range)   atorvastatin (LIPITOR) tablet 10 mg (has no administration in time range)   piperacillin-tazobactam (ZOSYN) 3.375 g vial to attach to  mL bag (0 g Intravenous Stopped 8/10/24 1343)   Tdap (tetanus-diphtheria-acell pertussis) (ADACEL) injection 0.5 mL (0.5 mLs Intramuscular $Given 8/10/24 1302)   gadobutrol (GADAVIST)  "injection 7 mL (7 mLs Intravenous $Given 8/10/24 1635)       NEW PRESCRIPTIONS STARTED AT TODAY'S ER VISIT     Current Discharge Medication List             =================================================================    HPI     Patient information was obtained from: patient     Use of : N/A         Petar Fermin is a 54 year old male with a history of diabetes mellitus type II who presents by walk-in for evaluation of an abscess.    3 days ago, patient discovered a little piece of wire in his left great toe. He does not know when the wire entered his toe due to his history of diabetes-associated neuropathy. Patient poked the wound because he saw pus. He states that he feels OK.    Patient does not wear shoes regularly because of his neuropathy. He endorses lower extremity swelling at baseline.    Per chart review, patient's last TDAP was 4/18/2009.      REVIEW OF SYSTEMS   Review of Systems   Skin:  Positive for color change and wound.   All other systems reviewed and are negative.       PAST MEDICAL HISTORY:     Past Medical History:   Diagnosis Date    DM2 (diabetes mellitus, type 2) (H)        PAST SURGICAL HISTORY:   No past surgical history on file.      CURRENT MEDICATIONS:   No current outpatient medications on file.       ALLERGIES:   No Known Allergies    FAMILY HISTORY:   No family history on file.    SOCIAL HISTORY:     Social History     Socioeconomic History    Marital status:    Tobacco Use    Smoking status: Never    Smokeless tobacco: Never       VITALS:   /82 (BP Location: Left arm)   Pulse 68   Temp 98.4  F (36.9  C) (Oral)   Resp 18   Ht 1.651 m (5' 5\")   Wt 76.2 kg (168 lb)   SpO2 98%   BMI 27.96 kg/m      PHYSICAL EXAM     Physical Exam    Physical Exam   Constitutional: Well-appearing.  Cardiovascular: Normal rate, regular rhythm and normal heart sounds.  2+ femoral pulses/radial/DP pulses B    Pulmonary/Chest: Normal effort  and breath sounds normal. "     Abdominal: soft nontender.    Musculoskeletal: Normal range of motion. Drainage from left big toe. Redness over left foot.    Neurological: Moves upper and lower extremities equally.    Lymphatics: no edema, no calves pain, no palpable cords.    Skin: Edema dorsum of the left foot.  Drainage from the left big toe with purulence.    Psychiatric: Normal mood and affect. Behavior is normal.                 LAB:     All pertinent labs reviewed and interpreted.  Labs Ordered and Resulted from Time of ED Arrival to Time of ED Departure   BASIC METABOLIC PANEL - Abnormal       Result Value    Sodium 136      Potassium 4.5      Chloride 98      Carbon Dioxide (CO2) 28      Anion Gap 10      Urea Nitrogen 13.8      Creatinine 0.62 (*)     GFR Estimate >90      Calcium 8.6 (*)     Glucose 341 (*)    CRP INFLAMMATION - Abnormal    CRP Inflammation 53.00 (*)    LACTIC ACID WHOLE BLOOD - Normal    Lactic Acid 0.9     CBC WITH PLATELETS AND DIFFERENTIAL    WBC Count 4.9      RBC Count 4.51      Hemoglobin 13.5      Hematocrit 41.5      MCV 92      MCH 29.9      MCHC 32.5      RDW 12.5      Platelet Count 259      % Neutrophils 54      % Lymphocytes 27      % Monocytes 18      % Eosinophils 2      % Basophils 0      % Immature Granulocytes 0      NRBCs per 100 WBC 0      Absolute Neutrophils 2.6      Absolute Lymphocytes 1.3      Absolute Monocytes 0.9      Absolute Eosinophils 0.1      Absolute Basophils 0.0      Absolute Immature Granulocytes 0.0      Absolute NRBCs 0.0     GLUCOSE MONITOR NURSING POCT   GLUCOSE MONITOR NURSING POCT   GLUCOSE MONITOR NURSING POCT   BLOOD CULTURE        RADIOLOGY:     Reviewed all pertinent imaging. Please see official radiology report.  Foot XR, G/E 3 views, left   Final Result   IMPRESSION: There is no evidence of an acute fracture with attention to the great toe. There is some soft tissue swelling and irregularity however there is no discrete radiopaque foreign body identified. No  significant joint space narrowing. No cortical    destructive change to suggest osteomyelitis.      MR Foot Left w/o & w Contrast    (Results Pending)        EKG:       I have independently reviewed and interpreted the EKG(s) documented above.      PROCEDURES:     Procedures      I, Lorena Jacobo, am serving as a scribe to document services personally performed by Dr. Salomon based on my observation and the provider's statements to me. I, Breann Salomon MD attest that Lorena Jacobo is acting in a scribe capacity, has observed my performance of the services and has documented them in accordance with my direction.    Breann Salomon M.D.  Emergency Medicine  [facility] EMERGENCY DEPARTMENT  [facility] [place] [facility] [id number] [phone]  Dept: [phone]      Breann Salomon MD  08/10/24 5083

## 2024-08-10 NOTE — ED TRIAGE NOTES
Patient states he had a wire stuck in left great toe. Now toe is infected, patient states he has been running intermittent fever. Toe is red, weeping. Foot is also redden and swollen, boarders marked with marker.     Triage Assessment (Adult)       Row Name 08/10/24 1210          Triage Assessment    Airway WDL WDL        Respiratory WDL    Respiratory WDL WDL        Skin Circulation/Temperature WDL    Skin Circulation/Temperature WDL all     Skin Temperature warm        Cardiac WDL    Cardiac WDL WDL        Peripheral/Neurovascular WDL    Peripheral Neurovascular WDL WDL        Cognitive/Neuro/Behavioral WDL    Cognitive/Neuro/Behavioral WDL WDL

## 2024-08-10 NOTE — PATIENT INSTRUCTIONS
Patient presents to  with left toe abscess from piece of wire caught in the skin which he found a couple days ago. He had chills a couple days ago but is currently afebrile. Redness is spreading proximally and he has uncontrolled diabetes and peripheral neuropathy. Advised he is seen in the ED for IV antibiotic, labs to assess for sepsis, drainage of abscess and r/o osteomyelitis.    Neli Morrissey, CNP

## 2024-08-10 NOTE — PROGRESS NOTES
Assessment & Plan     Abscess, toe, left  Type 2 diabetes mellitus with hyperglycemia, without long-term current use of insulin (H)  Patient presents to  with left toe abscess from piece of wire caught in the skin which he found a couple days ago. He had chills a couple days ago but is currently afebrile. Redness is spreading proximally and he has uncontrolled diabetes and peripheral neuropathy. Advised he is seen in the ED for IV antibiotic, labs to assess for sepsis, drainage of abscess and r/o osteomyelitis. Patient agrees and will go to ED across the street.          No follow-ups on file.    Jasmyn Morrissey, JACKELINE CNP  [facility] SREE Davey is a 54 year old male who presents to clinic today for the following health issues:  Chief Complaint   Patient presents with    Toe Pain     3 days left toe swollen and redness possible infected.       HPI      Review of Systems  Constitutional, HEENT, cardiovascular, pulmonary, gi and gu systems are negative, except as otherwise noted.      Objective    /82 (BP Location: Right arm, Patient Position: Right side, Cuff Size: Adult Regular)   Pulse 70   Temp 98.2  F (36.8  C) (Oral)   Resp 18   Wt 76.4 kg (168 lb 8 oz)   SpO2 99%   Physical Exam   GENERAL: alert and no distress  MS: no gross musculoskeletal defects noted, no edema  SKIN: abscess with open wound on left great toe with erythema extending from toe into mid foot  NEURO: Normal strength and tone, mentation intact and speech normal

## 2024-08-10 NOTE — PLAN OF CARE
Goal Outcome Evaluation:  Pt is A&O x4, up independently. Denied pain. L foot is red with 2+ edema, L great toe appears scabbed and is swollen, anthony LE pulses present. Pt reports anthony LE neuropathy. Denied SOB, VSS. Awaiting MRI and podiatry consult  Problem: Adult Inpatient Plan of Care  Goal: Optimal Comfort and Wellbeing  Outcome: Progressing     Problem: Skin or Soft Tissue Infection  Goal: Absence of Infection Signs and Symptoms  Outcome: Progressing  Intervention: Minimize and Manage Infection Progression  Recent Flowsheet Documentation  Taken 8/10/2024 1400 by Nini Richmond RN  Infection Prevention:   hand hygiene promoted   personal protective equipment utilized   single patient room provided   equipment surfaces disinfected     Problem: Adult Inpatient Plan of Care  Goal: Absence of Hospital-Acquired Illness or Injury  Intervention: Identify and Manage Fall Risk  Recent Flowsheet Documentation  Taken 8/10/2024 1400 by Nini Richmond RN  Safety Promotion/Fall Prevention:   patient and family education   nonskid shoes/slippers when out of bed  Intervention: Prevent Skin Injury  Recent Flowsheet Documentation  Taken 8/10/2024 1400 by Nini Richmond RN  Body Position: position changed independently  Intervention: Prevent Infection  Recent Flowsheet Documentation  Taken 8/10/2024 1400 by Nini Richmond RN  Infection Prevention:   hand hygiene promoted   personal protective equipment utilized   single patient room provided   equipment surfaces disinfected

## 2024-08-10 NOTE — MEDICATION SCRIBE - ADMISSION MEDICATION HISTORY
Medication Scribe Admission Medication History    Admission medication history is complete. The information provided in this note is only as accurate as the sources available at the time of the update.    Information Source(s): Patient via in-person    Pertinent Information: patient reports self management of medications.     Patient reports taking Metformin 500x4 daily with dinner. Patient reports receiving medications from [facility] pharmacy and often pays cash. (Possible reason why sure script data states last dispense was 11/9/23 for 90 days.     Changes made to PTA medication list:  Added: Advil  Deleted: Atorvastatin (per patient)  Changed: Metformin from 1000 BID to 2000 every day     Allergies reviewed with patient and updates made in EHR: yes    Medication History Completed By: Jose D Corona 8/10/2024 1:23 PM    PTA Med List   Medication Sig Last Dose    ibuprofen (ADVIL/MOTRIN) 200 MG tablet Take 400 mg by mouth every 6 hours as needed for pain 8/10/2024 at am    metFORMIN (GLUCOPHAGE XR) 500 MG 24 hr tablet Take 2,000 mg by mouth daily (with dinner) 8/9/2024 at pm

## 2024-08-10 NOTE — CONSULTS
Care Management Initial Consult    General Information  Assessment completed with: Patient, Children, carlin Davey  Type of CM/SW Visit: Initial Assessment    Primary Care Provider verified and updated as needed: Yes   Readmission within the last 30 days: no previous admission in last 30 days      Reason for Consult: discharge planning  Advance Care Planning: Advance Care Planning Reviewed: no concerns identified          Communication Assessment  Patient's communication style: spoken language (English or Bilingual)             Cognitive  Cognitive/Neuro/Behavioral: WDL                      Living Environment:   People in home: spouse, child(samanta), adult  Petar, spouse Yeni, daughter Iris  Current living Arrangements: house      Able to return to prior arrangements: yes       Family/Social Support:  Care provided by: self  Provides care for: no one  Marital Status:   Wife, Children  Yeni       Description of Support System: Supportive, Involved    Support Assessment: Patient communicates needs well met, Adequate family and caregiver support, Adequate social supports    Current Resources:   Patient receiving home care services: No     Community Resources: None  Equipment currently used at home: none  Supplies currently used at home: Diabetic Supplies    Employment/Financial:  Employment Status: employed full-time        Financial Concerns:             Does the patient's insurance plan have a 3 day qualifying hospital stay waiver?  No    Lifestyle & Psychosocial Needs:  Social Determinants of Health     Food Insecurity: Not on file   Depression: Not on file   Housing Stability: Not on file   Tobacco Use: Low Risk  (8/10/2024)    Patient History     Smoking Tobacco Use: Never     Smokeless Tobacco Use: Never     Passive Exposure: Not on file   Financial Resource Strain: Not on file   Alcohol Use: Not on file   Transportation Needs: Not on file   Physical Activity: Not on file   Interpersonal Safety: Not  on file   Stress: Not on file   Social Connections: Not on file   Health Literacy: Not on file       Functional Status:  Prior to admission patient needed assistance:   Dependent ADLs:: Independent  Dependent IADLs:: Independent  Assesssment of Functional Status: At functional baseline    Mental Health Status:          Chemical Dependency Status:                Values/Beliefs:  Spiritual, Cultural Beliefs, Temple Practices, Values that affect care:                 Additional Information:  Met with patient and son Gregorio for initial assessment. Introduced self and care management role. Patient lives with spouse Yeni and adult daughter Kinjal in their  home. He is independent with all his I/ADLs. He works full time as a . He uses no DME and has no home services. CM to monitor for home IV abx therapy orders. Family to transport.    Elizabeth Dowd RN

## 2024-08-10 NOTE — ED NOTES
"[facility] ED Handoff Report    ED Chief Complaint: left big toe infection    ED Diagnosis:  (L08.9) Toe infection  Comment: swab sent for cx.   Plan: IV antibiotics. Zosyn given.     (L03.116) Cellulitis of left foot  Comment: xray completed  Plan: MRI next.        PMH:  No past medical history on file.     Code Status:  No Order     Falls Risk: Yes Band: Applied    Current Living Situation/Residence: lives in a house     Elimination Status: Continent: Yes     Activity Level: Independent    Patients Preferred Language:  English     Needed: No    Vital Signs:  BP (!) 142/82   Pulse 68   Temp 98.1  F (36.7  C)   Resp 18   Ht 1.651 m (5' 5\")   Wt 76.2 kg (168 lb)   SpO2 99%   BMI 27.96 kg/m         Pain Score: 5/10    Is the Patient Confused:  No    Last Food or Drink: 08/10/24 at am    Focused Assessment:  left big toe red, swollen and weeping    Tests Performed: Done: Labs and Imaging    Treatments Provided:  see mar    Family Dynamics/Concerns: No    Family Updated On Visitor Policy: Yes    Plan of Care Communicated to Family: Yes    Who Was Updated about Plan of Care: pt updating family    Belongings Checklist Done and Signed by Patient: Yes      Covid: asymptomatic , not tested    Additional Information: tetanus shot given, blood cultures sent.    RN: Hedy Saba RN 8/10/2024 1:33 PM      "

## 2024-08-11 ENCOUNTER — ANESTHESIA EVENT (OUTPATIENT)
Dept: SURGERY | Facility: HOSPITAL | Age: 54
DRG: 629 | End: 2024-08-11

## 2024-08-11 ENCOUNTER — ANESTHESIA (OUTPATIENT)
Dept: SURGERY | Facility: HOSPITAL | Age: 54
DRG: 629 | End: 2024-08-11

## 2024-08-11 LAB
ALBUMIN SERPL BCG-MCNC: 3.4 G/DL (ref 3.5–5.2)
ANION GAP SERPL CALCULATED.3IONS-SCNC: 10 MMOL/L (ref 7–15)
BACTERIA SPEC CULT: ABNORMAL
BUN SERPL-MCNC: 12.8 MG/DL (ref 6–20)
CALCIUM SERPL-MCNC: 8.2 MG/DL (ref 8.8–10.4)
CHLORIDE SERPL-SCNC: 101 MMOL/L (ref 98–107)
CREAT SERPL-MCNC: 0.71 MG/DL (ref 0.67–1.17)
EGFRCR SERPLBLD CKD-EPI 2021: >90 ML/MIN/1.73M2
GLUCOSE BLDC GLUCOMTR-MCNC: 123 MG/DL (ref 70–99)
GLUCOSE BLDC GLUCOMTR-MCNC: 124 MG/DL (ref 70–99)
GLUCOSE BLDC GLUCOMTR-MCNC: 166 MG/DL (ref 70–99)
GLUCOSE BLDC GLUCOMTR-MCNC: 260 MG/DL (ref 70–99)
GLUCOSE BLDC GLUCOMTR-MCNC: 265 MG/DL (ref 70–99)
GLUCOSE BLDC GLUCOMTR-MCNC: 361 MG/DL (ref 70–99)
GLUCOSE SERPL-MCNC: 274 MG/DL (ref 70–99)
GRAM STAIN RESULT: ABNORMAL
GRAM STAIN RESULT: ABNORMAL
HCO3 SERPL-SCNC: 25 MMOL/L (ref 22–29)
PHOSPHATE SERPL-MCNC: 2.8 MG/DL (ref 2.5–4.5)
POTASSIUM SERPL-SCNC: 4 MMOL/L (ref 3.4–5.3)
SODIUM SERPL-SCNC: 136 MMOL/L (ref 135–145)

## 2024-08-11 PROCEDURE — 250N000011 HC RX IP 250 OP 636: Performed by: NURSE ANESTHETIST, CERTIFIED REGISTERED

## 2024-08-11 PROCEDURE — 20240 BONE BIOPSY OPEN SUPERFICIAL: CPT | Mod: 51 | Performed by: PODIATRIST

## 2024-08-11 PROCEDURE — 11042 DBRDMT SUBQ TIS 1ST 20SQCM/<: CPT | Performed by: NURSE ANESTHETIST, CERTIFIED REGISTERED

## 2024-08-11 PROCEDURE — 250N000011 HC RX IP 250 OP 636

## 2024-08-11 PROCEDURE — 250N000009 HC RX 250: Performed by: PODIATRIST

## 2024-08-11 PROCEDURE — 11042 DBRDMT SUBQ TIS 1ST 20SQCM/<: CPT | Performed by: ANESTHESIOLOGY

## 2024-08-11 PROCEDURE — 99233 SBSQ HOSP IP/OBS HIGH 50: CPT | Performed by: HOSPITALIST

## 2024-08-11 PROCEDURE — 87070 CULTURE OTHR SPECIMN AEROBIC: CPT | Performed by: PODIATRIST

## 2024-08-11 PROCEDURE — 258N000003 HC RX IP 258 OP 636: Performed by: ANESTHESIOLOGY

## 2024-08-11 PROCEDURE — 250N000013 HC RX MED GY IP 250 OP 250 PS 637: Performed by: HOSPITALIST

## 2024-08-11 PROCEDURE — 250N000012 HC RX MED GY IP 250 OP 636 PS 637: Performed by: HOSPITALIST

## 2024-08-11 PROCEDURE — 87102 FUNGUS ISOLATION CULTURE: CPT | Performed by: PODIATRIST

## 2024-08-11 PROCEDURE — 258N000003 HC RX IP 258 OP 636

## 2024-08-11 PROCEDURE — 250N000011 HC RX IP 250 OP 636: Performed by: ANESTHESIOLOGY

## 2024-08-11 PROCEDURE — 250N000011 HC RX IP 250 OP 636: Performed by: HOSPITALIST

## 2024-08-11 PROCEDURE — 99222 1ST HOSP IP/OBS MODERATE 55: CPT | Mod: 57 | Performed by: PODIATRIST

## 2024-08-11 PROCEDURE — 28002 TREATMENT OF FOOT INFECTION: CPT | Mod: LT | Performed by: PODIATRIST

## 2024-08-11 PROCEDURE — 87205 SMEAR GRAM STAIN: CPT | Performed by: PODIATRIST

## 2024-08-11 PROCEDURE — 999N000141 HC STATISTIC PRE-PROCEDURE NURSING ASSESSMENT: Performed by: PODIATRIST

## 2024-08-11 PROCEDURE — 87075 CULTR BACTERIA EXCEPT BLOOD: CPT | Performed by: PODIATRIST

## 2024-08-11 PROCEDURE — 250N000009 HC RX 250: Performed by: ANESTHESIOLOGY

## 2024-08-11 PROCEDURE — 99254 IP/OBS CNSLTJ NEW/EST MOD 60: CPT | Performed by: INTERNAL MEDICINE

## 2024-08-11 PROCEDURE — 36415 COLL VENOUS BLD VENIPUNCTURE: CPT | Performed by: HOSPITALIST

## 2024-08-11 PROCEDURE — 272N000001 HC OR GENERAL SUPPLY STERILE: Performed by: PODIATRIST

## 2024-08-11 PROCEDURE — 370N000017 HC ANESTHESIA TECHNICAL FEE, PER MIN: Performed by: PODIATRIST

## 2024-08-11 PROCEDURE — 0QBR0ZZ EXCISION OF LEFT TOE PHALANX, OPEN APPROACH: ICD-10-PCS | Performed by: PODIATRIST

## 2024-08-11 PROCEDURE — 80069 RENAL FUNCTION PANEL: CPT | Performed by: HOSPITALIST

## 2024-08-11 PROCEDURE — 360N000075 HC SURGERY LEVEL 2, PER MIN: Performed by: PODIATRIST

## 2024-08-11 PROCEDURE — 120N000001 HC R&B MED SURG/OB

## 2024-08-11 RX ORDER — NALOXONE HYDROCHLORIDE 1 MG/ML
0.1 INJECTION INTRAMUSCULAR; INTRAVENOUS; SUBCUTANEOUS
Status: DISCONTINUED | OUTPATIENT
Start: 2024-08-11 | End: 2024-08-11 | Stop reason: HOSPADM

## 2024-08-11 RX ORDER — SODIUM CHLORIDE, SODIUM LACTATE, POTASSIUM CHLORIDE, CALCIUM CHLORIDE 600; 310; 30; 20 MG/100ML; MG/100ML; MG/100ML; MG/100ML
INJECTION, SOLUTION INTRAVENOUS CONTINUOUS
Status: DISCONTINUED | OUTPATIENT
Start: 2024-08-11 | End: 2024-08-11 | Stop reason: HOSPADM

## 2024-08-11 RX ORDER — FENTANYL CITRATE 50 UG/ML
25-100 INJECTION, SOLUTION INTRAMUSCULAR; INTRAVENOUS
Status: DISCONTINUED | OUTPATIENT
Start: 2024-08-11 | End: 2024-08-11 | Stop reason: HOSPADM

## 2024-08-11 RX ORDER — ONDANSETRON 4 MG/1
4 TABLET, ORALLY DISINTEGRATING ORAL EVERY 30 MIN PRN
Status: DISCONTINUED | OUTPATIENT
Start: 2024-08-11 | End: 2024-08-11 | Stop reason: HOSPADM

## 2024-08-11 RX ORDER — MAGNESIUM HYDROXIDE 1200 MG/15ML
LIQUID ORAL PRN
Status: DISCONTINUED | OUTPATIENT
Start: 2024-08-11 | End: 2024-08-15 | Stop reason: HOSPADM

## 2024-08-11 RX ORDER — FENTANYL CITRATE 50 UG/ML
50 INJECTION, SOLUTION INTRAMUSCULAR; INTRAVENOUS EVERY 5 MIN PRN
Status: DISCONTINUED | OUTPATIENT
Start: 2024-08-11 | End: 2024-08-11 | Stop reason: HOSPADM

## 2024-08-11 RX ORDER — BUPIVACAINE HYDROCHLORIDE AND EPINEPHRINE 5; 5 MG/ML; UG/ML
INJECTION, SOLUTION PERINEURAL
Status: COMPLETED | OUTPATIENT
Start: 2024-08-11 | End: 2024-08-11

## 2024-08-11 RX ORDER — ONDANSETRON 2 MG/ML
4 INJECTION INTRAMUSCULAR; INTRAVENOUS EVERY 30 MIN PRN
Status: CANCELLED | OUTPATIENT
Start: 2024-08-11

## 2024-08-11 RX ORDER — PROPOFOL 10 MG/ML
INJECTION, EMULSION INTRAVENOUS CONTINUOUS PRN
Status: DISCONTINUED | OUTPATIENT
Start: 2024-08-11 | End: 2024-08-11

## 2024-08-11 RX ORDER — NALOXONE HYDROCHLORIDE 0.4 MG/ML
0.1 INJECTION, SOLUTION INTRAMUSCULAR; INTRAVENOUS; SUBCUTANEOUS
Status: CANCELLED | OUTPATIENT
Start: 2024-08-11

## 2024-08-11 RX ORDER — OXYCODONE HYDROCHLORIDE 5 MG/1
5 TABLET ORAL
Status: CANCELLED | OUTPATIENT
Start: 2024-08-11

## 2024-08-11 RX ORDER — DEXAMETHASONE SODIUM PHOSPHATE 10 MG/ML
4 INJECTION, SOLUTION INTRAMUSCULAR; INTRAVENOUS
Status: CANCELLED | OUTPATIENT
Start: 2024-08-11

## 2024-08-11 RX ORDER — ONDANSETRON 2 MG/ML
4 INJECTION INTRAMUSCULAR; INTRAVENOUS EVERY 30 MIN PRN
Status: DISCONTINUED | OUTPATIENT
Start: 2024-08-11 | End: 2024-08-11 | Stop reason: HOSPADM

## 2024-08-11 RX ORDER — FENTANYL CITRATE 50 UG/ML
25 INJECTION, SOLUTION INTRAMUSCULAR; INTRAVENOUS EVERY 5 MIN PRN
Status: DISCONTINUED | OUTPATIENT
Start: 2024-08-11 | End: 2024-08-11 | Stop reason: HOSPADM

## 2024-08-11 RX ORDER — FENTANYL CITRATE 50 UG/ML
25 INJECTION, SOLUTION INTRAMUSCULAR; INTRAVENOUS
Status: CANCELLED | OUTPATIENT
Start: 2024-08-11

## 2024-08-11 RX ORDER — LIDOCAINE 40 MG/G
CREAM TOPICAL
Status: DISCONTINUED | OUTPATIENT
Start: 2024-08-11 | End: 2024-08-11 | Stop reason: HOSPADM

## 2024-08-11 RX ORDER — OXYCODONE HYDROCHLORIDE 5 MG/1
10 TABLET ORAL
Status: CANCELLED | OUTPATIENT
Start: 2024-08-11

## 2024-08-11 RX ORDER — ONDANSETRON 4 MG/1
4 TABLET, ORALLY DISINTEGRATING ORAL EVERY 30 MIN PRN
Status: CANCELLED | OUTPATIENT
Start: 2024-08-11

## 2024-08-11 RX ORDER — DEXAMETHASONE SODIUM PHOSPHATE 4 MG/ML
4 INJECTION, SOLUTION INTRA-ARTICULAR; INTRALESIONAL; INTRAMUSCULAR; INTRAVENOUS; SOFT TISSUE
Status: DISCONTINUED | OUTPATIENT
Start: 2024-08-11 | End: 2024-08-11 | Stop reason: HOSPADM

## 2024-08-11 RX ADMIN — BUPIVACAINE HYDROCHLORIDE AND EPINEPHRINE 15 ML: 5; 5 INJECTION, SOLUTION PERINEURAL at 16:58

## 2024-08-11 RX ADMIN — ATORVASTATIN CALCIUM 10 MG: 10 TABLET, FILM COATED ORAL at 21:58

## 2024-08-11 RX ADMIN — PIPERACILLIN AND TAZOBACTAM 3.38 G: 3; .375 INJECTION, POWDER, FOR SOLUTION INTRAVENOUS at 18:50

## 2024-08-11 RX ADMIN — BUPIVACAINE HYDROCHLORIDE AND EPINEPHRINE BITARTRATE 20 ML: 5; .005 INJECTION, SOLUTION PERINEURAL at 16:55

## 2024-08-11 RX ADMIN — SODIUM CHLORIDE, POTASSIUM CHLORIDE, SODIUM LACTATE AND CALCIUM CHLORIDE: 600; 310; 30; 20 INJECTION, SOLUTION INTRAVENOUS at 16:58

## 2024-08-11 RX ADMIN — PIPERACILLIN AND TAZOBACTAM 3.38 G: 3; .375 INJECTION, POWDER, FOR SOLUTION INTRAVENOUS at 11:16

## 2024-08-11 RX ADMIN — PIPERACILLIN AND TAZOBACTAM 3.38 G: 3; .375 INJECTION, POWDER, FOR SOLUTION INTRAVENOUS at 03:57

## 2024-08-11 RX ADMIN — INSULIN ASPART 1 UNITS: 100 INJECTION, SOLUTION INTRAVENOUS; SUBCUTANEOUS at 13:11

## 2024-08-11 RX ADMIN — PROPOFOL 75 MCG/KG/MIN: 10 INJECTION, EMULSION INTRAVENOUS at 17:37

## 2024-08-11 RX ADMIN — SODIUM CHLORIDE 1250 MG: 9 INJECTION, SOLUTION INTRAVENOUS at 21:59

## 2024-08-11 RX ADMIN — FENTANYL CITRATE 100 MCG: 50 INJECTION, SOLUTION INTRAMUSCULAR; INTRAVENOUS at 16:56

## 2024-08-11 RX ADMIN — INSULIN GLARGINE 5 UNITS: 100 INJECTION, SOLUTION SUBCUTANEOUS at 08:07

## 2024-08-11 RX ADMIN — INSULIN ASPART 3 UNITS: 100 INJECTION, SOLUTION INTRAVENOUS; SUBCUTANEOUS at 08:05

## 2024-08-11 RX ADMIN — GABAPENTIN 100 MG: 100 CAPSULE ORAL at 13:11

## 2024-08-11 RX ADMIN — GABAPENTIN 100 MG: 100 CAPSULE ORAL at 19:07

## 2024-08-11 RX ADMIN — MIDAZOLAM HYDROCHLORIDE 2 MG: 1 INJECTION, SOLUTION INTRAMUSCULAR; INTRAVENOUS at 16:55

## 2024-08-11 RX ADMIN — GABAPENTIN 100 MG: 100 CAPSULE ORAL at 08:04

## 2024-08-11 RX ADMIN — SODIUM CHLORIDE 1250 MG: 9 INJECTION, SOLUTION INTRAVENOUS at 09:25

## 2024-08-11 ASSESSMENT — ACTIVITIES OF DAILY LIVING (ADL)
ADLS_ACUITY_SCORE: 19
ADLS_ACUITY_SCORE: 20
ADLS_ACUITY_SCORE: 19
ADLS_ACUITY_SCORE: 20
ADLS_ACUITY_SCORE: 19

## 2024-08-11 NOTE — ANESTHESIA PROCEDURE NOTES
Adductor canal Procedure Note    Pre-Procedure   Staff -        Anesthesiologist:  Tresa Howell MD       Performed By: anesthesiologist       Location: pre-op       Procedure Start/Stop Times: 8/11/2024 4:58 PM and 8/11/2024 5:00 PM       Pre-Anesthestic Checklist: patient identified, IV checked, site marked, risks and benefits discussed, informed consent, monitors and equipment checked, pre-op evaluation, at physician/surgeon's request and post-op pain management  Timeout:       Correct Patient: Yes        Correct Procedure: Yes        Correct Site: Yes        Correct Position: Yes        Correct Laterality: Yes        Site Marked: Yes  Procedure Documentation  Procedure: Adductor canal       Laterality: left       Patient Position: supine       Patient Prep/Sterile Barriers: sterile gloves, mask       Skin prep: Chloraprep       Needle type: stimuplex.       Needle Gauge: 21.        Needle Length (Inches): 4        Ultrasound guided       1. Ultrasound was used to identify targeted nerve, plexus, vascular marker, or fascial plane and place a needle adjacent to it in real-time.       2. Ultrasound was used to visualize the spread of anesthetic in close proximity to the above referenced structure.       3. A permanent image is entered into the patient's record.       4. The visualized anatomic structures appeared normal.       5. There were no apparent abnormal pathologic findings.    Assessment/Narrative         The placement was negative for: blood aspirated, painful injection and site bleeding       Paresthesias: No.       Bolus given via needle. no blood aspirated via catheter.        Secured via.        Insertion/Infusion Method: Single Shot       Complications: none       Injection made incrementally with aspirations every 5 mL.    Medication(s) Administered   Bupivacaine 0.5% w/ 1:200K Epi (Other) - Other   15 mL - 8/11/2024 4:58:00 PM  Medication Administration Time: 8/11/2024 4:58 PM      FOR  "[facility] ONLY:   Pain Team Contact information: please page the Pain Team Via ATOMOO. Search \"Pain\". During daytime hours, please page the attending first. At night please page the resident first.      "

## 2024-08-11 NOTE — CONSULTS
Consultation - Infectious Disease  [facility]  Petar Fermin,  1970, MRN [id number]    Admitting Dx: Cellulitis of left foot [L03.116]  Toe infection [L08.9]    PCP: Karen Jimenez, [phone]       ASSESSMENT   54-year-old male with a history of poorly controlled diabetes who is admitted with left foot infection.    Left diabetic foot infection.  Patient identified foreign body.  Subsequent swelling, erythema, fevers.  MRI showing osteomyelitis at the distal phalanx.  Seen by podiatry, with plans for debridement today.  Patient declining amputation.  Wound culture on 8/10 is polymicrobial.  Poorly controlled diabetes.  A1c 13.3    Active Problems:    Cellulitis of left foot    Toe infection       PLAN   -Continue vancomycin and Zosyn  -Follow-up on intraoperative cultures  -ABIs    Thank you for this consult. Will follow.    Magen Ramirez MD  [facility]  Direct messaging: [employer]  On-Call ID provider: [phone], option: [phone]      ===========================================      Chief Complaint   <principal problem not specified>       HPI     We have been requested by Neel Hedrick MD to evaluate Petar Fermin for the above.    History obtained by patient    Petar Fermin is a 54 year old man with a history of diabetes who is admitted with left foot infection.  The patient denies previous foot infections.  Earlier this week he noticed erythema in his foot.  He identified a small piece of metal wire that he removed from the tip of his first toe.  He was also having fevers, controlled with NSAIDs.  He went to urgent care yesterday and was directed to the ER.  MRI showed signs of osteomyelitis.  He was seen by podiatry who plans to take him to the OR today for debridement.    The patient reports chronic neuropathy.  He has close inspection of his feet regularly.  He wears sandals or crocs regularly at work because of the  "neuropathy.          Review of Systems   Ten systems reviewed and negative except for what is noted in the HPI       Medical History  Past Medical History:   Diagnosis Date    DM2 (diabetes mellitus, type 2) (H)     Surgical History  He  has no past surgical history on file.     Social History  Reviewed, and he  reports that he has never smoked. He has never used smokeless tobacco.  Social History     Social History Narrative    Not on file     Family History  family history is not on file.  family history reviewed and is not pertinent to the presenting problem.            Allergies   No Known Allergies      Antibiotics   Vancomycin 8/10-  Zosyn 8/10-    Previous:  none       Physical Exam     Temp:  [98.4  F (36.9  C)-99.3  F (37.4  C)] 99.3  F (37.4  C)  Pulse:  [68-83] 75  Resp:  [18] 18  BP: (113-139)/(63-82) 119/70  SpO2:  [94 %-98 %] 94 %    /70 (BP Location: Left arm)   Pulse 75   Temp 99.3  F (37.4  C) (Oral)   Resp 18   Ht 1.651 m (5' 5\")   Wt 76.2 kg (168 lb)   SpO2 94%   BMI 27.96 kg/m      GENERAL:  well-developed, well-nourished, sitting in bed in no acute distress.   HENT:  Head is normocephalic, atraumatic. Oropharynx is moist without exudates or ulcers.  EYES:  Eyes have anicteric sclerae without conjunctival injection or stigmata of endocarditis.   NECK:  Supple.  LUNGS:  Clear to auscultation.  CARDIOVASCULAR:  Regular rate and rhythm with no murmurs, gallops or rubs.  ABDOMEN:  Normal bowel sounds, soft, nontender. No appreciable hepatosplenomegaly  EXT: Extremities warm and without edema.  left first toe with erythema and swelling extending down to the ball of the foot.  Ulceration at the tip of the toe with purulent output. Palpable dorsalis pedis bilaterally.  SKIN:  No acute rashes.  No stigmata of endocarditis.  NEUROLOGIC:  Grossly nonfocal.      Cultures   8/10 blood culture: No growth to date  8/10 left toe culture: Klebsiella pneumoniae, Citrobacter freundii, group G strep, " Enterococcus faecalis    Susceptibility data from last 90 days.  Collected Specimen Info Organism   08/10/24 Wound from Toe, Left Citrobacter freundii complex     Enterococcus faecalis     Klebsiella pneumoniae     Streptococcus canis (Group G Streptococcus)        Laboratory results     Recent Labs   Lab 08/10/24  1245   WBC 4.9   HGB 13.5          Recent Labs   Lab 08/11/24  0642 08/10/24  1245    136   CO2 25 28   BUN 12.8 13.8   ALBUMIN 3.4*  --        Recent Labs   Lab 08/10/24  1245   CRPI 53.00*           Imaging   Radiology results reviewed    MR Foot Left w/o & w Contrast    Result Date: 8/10/2024  EXAM: MR FOOT LEFT W/O and W CONTRAST LOCATION: [facility] DATE: 8/10/2024 INDICATION: Diabetic foot infection, had preceding penetrating injury. COMPARISON: Radiographs from 8/10/2024. TECHNIQUE: Routine. Additional postgadolinium T1 sequences were obtained. IV CONTRAST: 7ml Gadavist FINDINGS: JOINTS AND BONES: -There is bone marrow edema like signal and enhancement and loss of T1 signal in the distal phalanx of the great toe. No effusion or synovitis. The bones are normal otherwise. The joints appear normal. TENDONS: -No tendon tear, tendinopathy, or tenosynovitis. LIGAMENTS: -Lisfranc ligament: Intact. No subluxation. MUSCLES AND SOFT TISSUES: -There is a tract of abnormal signal extending from the distal plantar skin surface of the great toe proximally and dorsally to near the distal tuft, as seen on image 5 of series 9 and image 17 of series 6 and image 31 of series 5. This may represent the  tract of a penetrating injury. There is no definite foreign body or abscess. There is adjacent edema and enhancement consistent with cellulitis.     IMPRESSION: 1.  There appears to be a tract from a previous penetrating injury in the distal end of the great toe extending down close to the distal tuft of the distal phalanx of the great toe. 2.  There is underlying bone marrow edema  like signal and enhancement as well as loss of T1 signal in the distal phalanx of the great toe, strong evidence of osteomyelitis. 3.  There is no evidence of abscess, tenosynovitis, myositis or septic joint.    Foot XR, G/E 3 views, left    Result Date: 8/10/2024  EXAM: XR FOOT LEFT G/E 3 VIEWS LOCATION: [facility] DATE: 8/10/2024 INDICATION: Swelling and redness. Evaluate for retained foreign body in the left big toe. COMPARISON: None.     IMPRESSION: There is no evidence of an acute fracture with attention to the great toe. There is some soft tissue swelling and irregularity however there is no discrete radiopaque foreign body identified. No significant joint space narrowing. No cortical destructive change to suggest osteomyelitis.      Data reviewed today: I reviewed all medications, new labs and imaging results over the last 24 hours. I personally reviewed the MR image(s) showing left hallux osteomyelitis  .  The patient's care was discussed with the Patient and Patient's Family.

## 2024-08-11 NOTE — ANESTHESIA PREPROCEDURE EVALUATION
"Anesthesia Pre-Procedure Evaluation    Patient: Petar Fermin   MRN: [id number] : 1970        Procedure : Procedure(s):  IRRIGATION AND DEBRIDEMENT, FOOT          Past Medical History:   Diagnosis Date    DM2 (diabetes mellitus, type 2) (H)       No past surgical history on file.   No Known Allergies   Social History     Tobacco Use    Smoking status: Never    Smokeless tobacco: Never   Substance Use Topics    Alcohol use: Not on file      Wt Readings from Last 1 Encounters:   08/10/24 76.2 kg (168 lb)        Anesthesia Evaluation   Pt has had prior anesthetic.     No history of anesthetic complications       ROS/MED HX  ENT/Pulmonary:  - neg pulmonary ROS     Neurologic:  - neg neurologic ROS     Cardiovascular:     (+) Dyslipidemia hypertension- -   -  - -                                      METS/Exercise Tolerance:     Hematologic:  - neg hematologic  ROS     Musculoskeletal:  - neg musculoskeletal ROS     GI/Hepatic:  - neg GI/hepatic ROS  (-) GERD, hiatal hernia and esophageal disease   Renal/Genitourinary:       Endo:     (+)  type II DM, Last HgA1c: 133,  Using insulin,    Diabetic complications: neuropathy.             Psychiatric/Substance Use:  - neg psychiatric ROS     Infectious Disease: Comment: L foot osteomyelitis s/f I&D      Malignancy:       Other:               OUTSIDE LABS:  CBC:   Lab Results   Component Value Date    WBC 4.9 08/10/2024    HGB 13.5 08/10/2024    HCT 41.5 08/10/2024     08/10/2024     BMP:   Lab Results   Component Value Date     2024     08/10/2024    POTASSIUM 4.0 2024    POTASSIUM 4.5 08/10/2024    CHLORIDE 101 2024    CHLORIDE 98 08/10/2024    CO2 25 2024    CO2 28 08/10/2024    BUN 12.8 2024    BUN 13.8 08/10/2024    CR 0.71 2024    CR 0.62 (L) 08/10/2024     (H) 2024     (H) 2024     COAGS: No results found for: \"PTT\", \"INR\", \"FIBR\"  POC: No results found for: \"BGM\", \"HCG\", " "\"HCGS\"  HEPATIC:   Lab Results   Component Value Date    ALBUMIN 3.4 (L) 08/11/2024     OTHER:   Lab Results   Component Value Date    LACT 0.9 08/10/2024    A1C 13.3 (H) 08/10/2024    IVIS 8.2 (L) 08/11/2024    PHOS 2.8 08/11/2024       Anesthesia Plan    ASA Status:  3    NPO Status:  NPO Appropriate    Anesthesia Type: General.     - Airway: Mask Only   Induction: Intravenous, Propofol.   Maintenance: TIVA.        Consents            Postoperative Care    Pain management: Peripheral nerve block (Single Shot).   PONV prophylaxis: Ondansetron (or other 5HT-3), Background Propofol Infusion     Comments:    Other Comments: Popliteal and saphenous nerve blocks  General Mask - TIVA  Zofran 4mg           Tresa Zavala MD    I have reviewed the pertinent notes and labs in the chart from the past 30 days and (re)examined the patient.  Any updates or changes from those notes are reflected in this note.             "

## 2024-08-11 NOTE — PLAN OF CARE
Problem: Skin or Soft Tissue Infection  Goal: Absence of Infection Signs and Symptoms  Intervention: Minimize and Manage Infection Progression  Recent Flowsheet Documentation  Taken 8/10/2024 1710 by Yaya Yousif RN  Infection Prevention: hand hygiene promoted     Problem: Comorbidity Management  Goal: Blood Glucose Levels Within Targeted Range  Outcome: Progressing   Goal Outcome Evaluation:                  Patient A and O times 4. Independent in his room, room air, saline locked. LLE red and edematous. Patient blood sugars 364 at dinner and 288 at HS. Insulin given as ordered. Patient had MRI of LLE this shift.  Provider seen results, vancomycin IV ordered. Patient verbalized no pain this shift.

## 2024-08-11 NOTE — ANESTHESIA CARE TRANSFER NOTE
Patient: Petar Fermin    Procedure: Procedure(s):  IRRIGATION AND DEBRIDEMENT, FOOT       Diagnosis: Cellulitis of left foot [L03.116]  Diagnosis Additional Information: No value filed.    Anesthesia Type:   General     Note:    Oropharynx: oropharynx clear of all foreign objects  Level of Consciousness: awake and drowsy  Oxygen Supplementation: face mask    Independent Airway: airway patency satisfactory and stable  Dentition: dentition unchanged  Vital Signs Stable: post-procedure vital signs reviewed and stable  Report to RN Given: handoff report given  Patient transferred to: PACU    Handoff Report: Identifed the Patient, Identified the Reponsible Provider, Reviewed the pertinent medical history, Discussed the surgical course, Reviewed Intra-OP anesthesia mangement and issues during anesthesia, Set expectations for post-procedure period and Allowed opportunity for questions and acknowledgement of understanding      Vitals:  Vitals Value Taken Time   BP 99/60    Temp 98.1F    Pulse 63    Resp 15    SpO2 99%        Electronically Signed By: JACKELINE Eddy Jr, CRNA  August 11, 2024  6:07 PM

## 2024-08-11 NOTE — ANESTHESIA PROCEDURE NOTES
"Popliteal Procedure Note    Pre-Procedure   Staff -        Anesthesiologist:  Tresa Howell MD       Performed By: anesthesiologist       Location: pre-op       Procedure Start/Stop Times: 8/11/2024 4:55 PM and 8/11/2024 4:57 PM       Pre-Anesthestic Checklist: patient identified, IV checked, site marked, risks and benefits discussed, informed consent, monitors and equipment checked, pre-op evaluation, at physician/surgeon's request and post-op pain management  Timeout:       Correct Patient: Yes        Correct Procedure: Yes        Correct Site: Yes        Correct Position: Yes        Correct Laterality: Yes        Site Marked: Yes  Procedure Documentation  Procedure: Popliteal       Laterality: left       Patient Position: supine       Patient Prep/Sterile Barriers: sterile gloves, mask       Skin prep: Chloraprep       Needle Gauge: 21.        Needle Length (Inches): 4        Ultrasound guided       1. Ultrasound was used to identify targeted nerve, plexus, vascular marker, or fascial plane and place a needle adjacent to it in real-time.       2. Ultrasound was used to visualize the spread of anesthetic in close proximity to the above referenced structure.       3. A permanent image is entered into the patient's record.       4. The visualized anatomic structures appeared normal.       5. There were no apparent abnormal pathologic findings.    Assessment/Narrative         The placement was negative for: blood aspirated, painful injection and site bleeding       Paresthesias: No.       Insertion/Infusion Method: Single Shot       Complications: none       Injection made incrementally with aspirations every 5 mL.    Medication(s) Administered   Bupivacaine 0.5% w/ 1:200K Epi (Other) - Other   20 mL - 8/11/2024 4:55:00 PM  Medication Administration Time: 8/11/2024 4:55 PM      FOR [facility] ([facility] ONLY:   Pain Team Contact information: please page the Pain Team Via [employer]. Search \"Pain\". During " daytime hours, please page the attending first. At night please page the resident first.

## 2024-08-11 NOTE — CONSULTS
"Subjective:    Pt is seen today in consult for left foot cellulitis.  Started approximately 3 to 4 days ago.  He noticed a piece of small metal wire in his foot.  He is unsure how long this has been there for.  He remove this.  Redness swelling and pain slowly got worse.  Increasing redness going up his foot.  He then presented to [facility].  Has history of uncontrolled diabetes with numbness in feet.  He has never smoked.  Denies heavy alcohol use.  No family history on file.    ROS:  A 10-point review of systems was performed and is positive for that noted in the HPI and as seen below.  All other areas are negative.        No Known Allergies    No current outpatient medications on file.       Patient Active Problem List   Diagnosis    Allergic Rhinitis    Type 2 Diabetes Mellitus    Cervicogenic headache    Displacement of lumbar intervertebral disc without myelopathy    Erectile dysfunction due to diseases classified elsewhere    Cellulitis of left foot    Toe infection       Past Medical History:   Diagnosis Date    DM2 (diabetes mellitus, type 2) (H)        No past surgical history on file.    No family history on file.    Social History     Tobacco Use    Smoking status: Never    Smokeless tobacco: Never   Substance Use Topics    Alcohol use: Not on file         Exam:    Vitals: /70 (BP Location: Left arm)   Pulse 75   Temp 99.3  F (37.4  C) (Oral)   Resp 18   Ht 1.651 m (5' 5\")   Wt 76.2 kg (168 lb)   SpO2 94%   BMI 27.96 kg/m    BMI: Body mass index is 27.96 kg/m .  Height: 5' 5\"    Constitutional/ general:  Pt is in no apparent distress, appears well-nourished.  Cooperative with history and physical exam.  Seen with wife and son today.    Psych:  The patient answered questions appropriately.  Normal affect.  Seems to have reasonable expectations, in terms of treatment.     Eyes:  Visual scanning/ tracking without deficit.     Ears:  Response to auditory stimuli is normal.  negative " hearing aid devices.  Auricles in proper alignment.     Lymphatic:  Popliteal lymph nodes not enlarged.     Lungs:  Non labored breathing, non labored speech. No cough.  No audible wheezing. Even, quiet breathing.       Vascular:  positive pedal pulses.  CFT < 3 sec.  positive ankle edema.  positive pedal hair growth.    Neuro:  Alert and oriented x 3.  Muscle compartments intact.  Light touch sensation is decreased.    Derm: Normal texture and turgor.  No erythema, ecchymosis, or cyanosis.      Musculoskeletal:    Lower extremity muscle strength is normal.  No gross forefoot or rear foot deformities noted.  Normal range of motion of all forefoot and rearfoot joints.  Left hallux plantar distal lateral eschar with purulent material coming from it.  There is erythema and edema on the hallux and on the dorsum of the first metatarsal distal.  There is no crepitus with palpation.  We also no blistering on the lateral portion of the left hallux.  No other open wounds are noted on foot.    Radiographic Exam:    Narrative & Impression   EXAM: MR FOOT LEFT W/O and W CONTRAST  LOCATION: [facility]  DATE: 8/10/2024     INDICATION: Diabetic foot infection, had preceding penetrating injury.  COMPARISON: Radiographs from 8/10/2024.  TECHNIQUE: Routine. Additional postgadolinium T1 sequences were obtained.  IV CONTRAST: 7ml Gadavist     FINDINGS:      JOINTS AND BONES:   -There is bone marrow edema like signal and enhancement and loss of T1 signal in the distal phalanx of the great toe. No effusion or synovitis. The bones are normal otherwise. The joints appear normal.     TENDONS:   -No tendon tear, tendinopathy, or tenosynovitis.      LIGAMENTS:   -Lisfranc ligament: Intact. No subluxation.     MUSCLES AND SOFT TISSUES:   -There is a tract of abnormal signal extending from the distal plantar skin surface of the great toe proximally and dorsally to near the distal tuft, as seen on image 5 of series 9 and  image 17 of series 6 and image 31 of series 5. This may represent the   tract of a penetrating injury. There is no definite foreign body or abscess. There is adjacent edema and enhancement consistent with cellulitis.      Latest Reference Range & Units Most Recent   Hemoglobin A1C <5.7 % 13.3 (H)  8/10/24 12:45   (H): Data is abnormally high     Latest Reference Range & Units Most Recent   CRP Inflammation <5.00 mg/L 53.00 (H)  8/10/24 12:45   (H): Data is abnormally high     Latest Reference Range & Units Most Recent   Albumin 3.5 - 5.2 g/dL 3.4 (L)  8/11/24 06:42   (L): Data is abnormally low     Latest Reference Range & Units Most Recent   WBC 4.0 - 11.0 10e3/uL 4.9  8/10/24 12:45      Contains abnormal data Wound Aerobic Bacterial Culture Routine With Gram Stain  Order: [id number]  Collected 8/10/2024 12:49 PM       Status: Preliminary result       Visible to patient: No (not released)    Specimen Information: Toe, Left; Wound   0 Result Notes  Culture Culture in progress      1+ Klebsiella pneumoniae Abnormal    Identification is preliminary, confirmation in progress   1+ Citrobacter freundii complex Abnormal    Identification is preliminary, confirmation in progress   1+ Streptococcus canis (Group G Streptococcus) Abnormal    This organism is susceptible to ampicillin, penicillin, vancomycin and the cephalosporins. If treatment is required and your patient is allergic to penicillin, contact the microbiology lab within 5 days to request susceptibility testing.   1+ Enterococcus faecalis Abnormal                Gram Stain Result  Abnormal   1+ Gram negative bacilli      1+ Gram positive cocci      2+ WBC seen              Resulting Agency: [facility]           Specimen Collected: 08/10/24 12:49 PM Last Resulted: 08/11/24  8:38 AM          A:  Diabetes mellitus, uncontrolled, with peripheral neuropathy and LOPS  Left hallux foreign body, ulcer, first ray cellulitis    P:  Discussed the cause of this with the patient.   Discussed loss of sensation can allow infections to start without patient being aware of this.  Explained how this can cause more damage than what can be seen on the outside.  Also discussed uncontrolled diabetes makes it difficult for her body to fight infection.  Explained to patient that we have to remove all infected material.  Discussed that distal phalanx is inflamed on MRI.  This could be from osteomyelitis, possibly surrounding infection.  Discussed having to amputate this bone in the future as a possibility.  Patient states he does not want this done at this time.  Discussed we will for start with removing soft tissue and getting good deep cultures.  Discussed this is high risk surgery and with patient's uncontrolled diabetes will be harder to resolve infection and heal this wound.  Encouraged better control of blood sugars.  Patient last ate at 9:00am this morning.  Will make him n.p.o.  His surgery is scheduled for today at 5 PM.  Thank you for allowing me to participate in the care of this patient.    Prabhakar Waters, SILVIA, FACFAS

## 2024-08-11 NOTE — OP NOTE
Surgeon:  Dr. Prabhakar Waters    Date of Surgery:  8/11/24    Preopp diagnosis: Left hallux cellulitis with possible underlying osteomyelitis    Postopp diagnosis: same    Procedure: Left hallux I&D down to including distal phalanx    Anesthesia: MAC    Hemostasis: Pneumatic ankle tourniquet at 250 mmHg    Indications: Patient has poorly controlled diabetes and neuropathy.  Recently had left lower extremity infection.  Necrotic tissue noted at bedside.  MRI positive for inflamed distal phalanx.  Patient elects to have all infected soft tissue removed.  He does not want his distal phalanx amputated at this time.  He understands that this is a possibility in the future if he has osteomyelitis and this is not healing.  He understands this is high risk surgery because of his poorly controlled diabetes.    Patient had left popliteal block before being brought to the operating room.  Patient was brought to the operating table in a supine position The foot was then prepped and draped in the normal sterile manner and a pneumatic ankle tourniquet was placed around the ankle with copious amounts of Webril padding.  The foot was then elevated for complete exsanguination the tourniquet was inflated and the foot was brought on the operating table where the following procedure was performed.    At this time we evaluated with the left hallux.  Necrotic tissue noted distal plantar lateral portion of the toe.  The fat was discolored and necrosis.  Remove this all the way down to the bone.  Culture was done at this time aerobic anaerobic and fungal.  We removed all the necrotic tissue in this area including a small portion of the bone which when dissecting with a 15 blade and gently scraped off.  The underlying bone appeared hard.  There is no other sinus tracts purulence or odor anywhere.  We flushed the wound.  We did a thorough evaluation and we are confident nor more obvious infected material was noted.  We then packed this with  Xeroform gauze and then dressed with Adaptic 4 x 4's Kristi Coban Kerlix and Coban.  Tourniquet was deflated all digits were no sterile preop levels of perfusion  Complications were none.  Estimated blood loss was 1 cc.  The patient tolerated procedure and anesthesia well.  He was then wheeled from the op room to the cover room vital signs stable and intact sterile dressing.      Prabhakar Waters DPM, FACFAS

## 2024-08-11 NOTE — BRIEF OP NOTE
[facility]    Brief Operative Note    Pre-operative diagnosis: Cellulitis of left foot [L03.116]  Post-operative diagnosis        same    Procedure: IRRIGATION AND DEBRIDEMENT, FOOT, Left - Foot    Surgeon: Surgeons and Role:     * Prabhakar Waters DPM - Primary  Anesthesia: MAC with Block   Estimated Blood Loss: 1cc    Drains: none  Specimens: Soft tissue culture aerobic anaerobic and fungal   Findings: Necrotic tissue down to distal phalanx.  Complications: None  Implants: None    Prabhakar Waters DPM, FACFAS

## 2024-08-11 NOTE — PLAN OF CARE
Goal Outcome Evaluation:     Pt is A&Ox4, up independently to restroom or chair. Denied pain. Reports chronic numbness to anthony LE. LLE has 1+ edema, redness but improved from yesterday. L great toe with dried drainage and escar open to air. Plan for I&D late afternoon. Pt NPO around 9am. VSS, afebrile, receiving IV abx. Awaiting ID consult  Problem: Adult Inpatient Plan of Care  Goal: Optimal Comfort and Wellbeing  Outcome: Progressing     Problem: Skin or Soft Tissue Infection  Goal: Absence of Infection Signs and Symptoms  Outcome: Progressing     Problem: Comorbidity Management  Goal: Blood Glucose Levels Within Targeted Range  Outcome: Progressing     Problem: Infection  Goal: Absence of Infection Signs and Symptoms  Outcome: Progressing     Problem: Pain Acute  Goal: Optimal Pain Control and Function  Outcome: Progressing     Problem: Adult Inpatient Plan of Care  Goal: Absence of Hospital-Acquired Illness or Injury  Intervention: Identify and Manage Fall Risk  Recent Flowsheet Documentation  Taken 8/11/2024 0800 by Nini Richmond, RN  Safety Promotion/Fall Prevention: patient and family education

## 2024-08-11 NOTE — ANESTHESIA POSTPROCEDURE EVALUATION
Patient: Petar Fermin    Procedure: Procedure(s):  IRRIGATION AND DEBRIDEMENT, FOOT       Anesthesia Type:  General    Note:  Disposition: Inpatient   Postop Pain Control: Uneventful            Sign Out: Well controlled pain   PONV: No   Neuro/Psych: Uneventful            Sign Out: Acceptable/Baseline neuro status   Airway/Respiratory: Uneventful            Sign Out: Acceptable/Baseline resp. status   CV/Hemodynamics: Uneventful            Sign Out: Acceptable CV status; No obvious hypovolemia; No obvious fluid overload   Other NRE: NONE   DID A NON-ROUTINE EVENT OCCUR?            Last vitals:  Vitals:    08/11/24 1725 08/11/24 1730 08/11/24 1824   BP: 112/64 104/60 138/76   Pulse: 67 67 64   Resp: 11 10 16   Temp:   36.6  C (97.8  F)   SpO2: 98% 98% 100%       Electronically Signed By: Tresa Zavala MD  August 11, 2024  6:32 PM

## 2024-08-11 NOTE — PROGRESS NOTES
Brief Progress Note:    Event: Paged regarding left foot MRI results. Patient with diabetes and already started on zosyn for left foot cellulitis by hospitalist. MRI results indicate strong evidence of osteomyelitis of the great toe.    Plan: Add vancomycin to abx regimen. Defer further management to hospitalist in the AM.     Wayne Ybarra MD, PGY1  [facility]

## 2024-08-11 NOTE — PROGRESS NOTES
"[facility]    Medicine Progress Note - Hospitalist Service    Date of Admission:  8/10/2024    Assessment & Plan   Petar Fermin is a 54 year old male admitted on 8/10/2024. He is being admitted for left foot cellulitis.       #Left foot cellulitis, diabetic foot infection  #Left great toe osteomyelitis  -Duration of symptoms unclear as patient does not recall initial injury, infectious symptoms seem to have acutely worsened 3 days prior to admission when patient manipulated his wound  -Mild fevers but otherwise nontoxic on admission  -Left foot MRI 8/10 showing osteomyelitis changes left great toe  -Podiatry consult, plan for limited debridement 8/11  -ID consult 8/11  -Continue with Vanco and Zosyn, wound culture polymicrobial  -Wound care consult  -Follow-up cultures     #Type 2 diabetes mellitus complicated by hyperglycemia and peripheral neuropathy  -A1c over 13%, blood sugar in the 300s on admission likely from infectious stress  -Routinely takes metformin, holding  -Moderate correctional scale ordered, titrate as needed  -5 units Lantus started 8/11     #Hyperlipidemia  -Resume usual atorvastatin          Diet: NPO per Anesthesia Guidelines for Procedure/Surgery Except for: Meds    DVT Prophylaxis: Pneumatic Compression Devices  Martinez Catheter: Not present  Lines: None     Cardiac Monitoring: None  Code Status: Full Code      Clinically Significant Risk Factors Present on Admission          # Hypocalcemia: Lowest Ca = 8.2 mg/dL in last 2 days, will monitor and replace as appropriate     # Hypoalbuminemia: Lowest albumin = 3.4 g/dL at 8/11/2024  6:42 AM, will monitor as appropriate            # DMII: A1C = 13.3 % (Ref range: <5.7 %) within past 6 months    # Overweight: Estimated body mass index is 27.96 kg/m  as calculated from the following:    Height as of this encounter: 1.651 m (5' 5\").    Weight as of this encounter: 76.2 kg (168 lb).                    Disposition Plan "     Medically Ready for Discharge: Anticipated in 2-4 Days             Neel Hedrick MD  Hospitalist Service  [facility]  Securely message with [employer] (more info)  Text page via [employer] Paging/Directory   ______________________________________________________________________    Interval History   No overnight events, minimal pain, no new swelling, no fevers.    Physical Exam   Vital Signs: Temp: 99.3  F (37.4  C) Temp src: Oral BP: 119/70 Pulse: 75   Resp: 18 SpO2: 94 % O2 Device: None (Room air)    Weight: 168 lbs 0 oz    Well-appearing, no acute distress, membranes moist, heart rate regular, lungs clear, abdomen soft, trace left lower extremity edema, improving erythema left foot, stable ulceration left great toe, normal affect and mood    Medical Decision Making       56 MINUTES SPENT BY ME on the date of service doing chart review, history, exam, documentation & further activities per the note.  NOTE(S)/MEDICAL RECORDS REVIEWED over the past 24 hours: Vitals, labs, new imaging, documentation and medication administrations       Data     I have personally reviewed the following data over the past 24 hrs:    4.9  \   13.5   / 259     136 101 12.8 /  260 (H)   4.0 25 0.71 \     ALT: N/A AST: N/A AP: N/A TBILI: N/A   ALB: 3.4 (L) TOT PROTEIN: N/A LIPASE: N/A     TSH: N/A T4: N/A A1C: 13.3 (H)     Procal: N/A CRP: 53.00 (H) Lactic Acid: 0.9         Imaging results reviewed over the past 24 hrs:   Recent Results (from the past 24 hour(s))   Foot XR, G/E 3 views, left    Narrative    EXAM: XR FOOT LEFT G/E 3 VIEWS  LOCATION: [facility]  DATE: 8/10/2024    INDICATION: Swelling and redness. Evaluate for retained foreign body in the left big toe.  COMPARISON: None.      Impression    IMPRESSION: There is no evidence of an acute fracture with attention to the great toe. There is some soft tissue swelling and irregularity however there is no discrete radiopaque foreign body  identified. No significant joint space narrowing. No cortical   destructive change to suggest osteomyelitis.   MR Foot Left w/o & w Contrast    Narrative    EXAM: MR FOOT LEFT W/O and W CONTRAST  LOCATION: [facility]  DATE: 8/10/2024    INDICATION: Diabetic foot infection, had preceding penetrating injury.  COMPARISON: Radiographs from 8/10/2024.  TECHNIQUE: Routine. Additional postgadolinium T1 sequences were obtained.  IV CONTRAST: 7ml Gadavist    FINDINGS:     JOINTS AND BONES:   -There is bone marrow edema like signal and enhancement and loss of T1 signal in the distal phalanx of the great toe. No effusion or synovitis. The bones are normal otherwise. The joints appear normal.    TENDONS:   -No tendon tear, tendinopathy, or tenosynovitis.     LIGAMENTS:   -Lisfranc ligament: Intact. No subluxation.    MUSCLES AND SOFT TISSUES:   -There is a tract of abnormal signal extending from the distal plantar skin surface of the great toe proximally and dorsally to near the distal tuft, as seen on image 5 of series 9 and image 17 of series 6 and image 31 of series 5. This may represent the   tract of a penetrating injury. There is no definite foreign body or abscess. There is adjacent edema and enhancement consistent with cellulitis.      Impression    IMPRESSION:  1.  There appears to be a tract from a previous penetrating injury in the distal end of the great toe extending down close to the distal tuft of the distal phalanx of the great toe.  2.  There is underlying bone marrow edema like signal and enhancement as well as loss of T1 signal in the distal phalanx of the great toe, strong evidence of osteomyelitis.  3.  There is no evidence of abscess, tenosynovitis, myositis or septic joint.

## 2024-08-11 NOTE — PROGRESS NOTES
"Care Management Follow Up    Length of Stay (days): 1    Expected Discharge Date: 08/12/2024     Concerns to be Addressed: Care progression - discharge planning     Patient plan of care discussed at interdisciplinary rounds: Yes    Anticipated Discharge Disposition:  TBD     Anticipated Discharge Services:  TBD  Anticipated Discharge DME:  NA    Patient/family educated on Medicare website which has current facility and service quality ratings:  NA  Education Provided on the Discharge Plan:  Yes per team  Patient/Family in Agreement with the Plan:  NA    Referrals Placed by CM/SW:  [facility]  Private pay costs discussed: Not applicable    Additional Information:  Per provider, Dr. Hedrick, patient will be here for a couple of days. May have an amputation.    Social Hx: \"Live w/spouse/adult dtr in a house . Independent w/all I/ADLs. Work as a . Use no DME, has no services. Possible amputation, IV abx? Family to transport.\"    RNCM to follow for medical progression, recommendations, and final discharge plan.     Bhavna Hill RN     "

## 2024-08-11 NOTE — PHARMACY-VANCOMYCIN DOSING SERVICE
"Pharmacy Vancomycin Initial Note  Date of Service August 10, 2024  Patient's  1970  54 year old, male    Indication: Osteomyelitis    Current estimated CrCl = Estimated Creatinine Clearance: 131.4 mL/min (A) (based on SCr of 0.62 mg/dL (L)).    Creatinine for last 3 days  8/10/2024: 12:45 PM Creatinine 0.62 mg/dL    Recent Vancomycin Level(s) for last 3 days  No results found for requested labs within last 3 days.      Vancomycin IV Administrations (past 72 hours)        No vancomycin orders with administrations in past 72 hours.                    Nephrotoxins and other renal medications (From now, onward)      Start     Dose/Rate Route Frequency Ordered Stop    24 0930  vancomycin (VANCOCIN) 1,250 mg in sodium chloride 0.9 % 250 mL intermittent infusion        Placed in \"Followed by\" Linked Group    1,250 mg  over 90 Minutes Intravenous EVERY 12 HOURS 08/10/24 2114      08/10/24 2130  vancomycin (VANCOCIN) 1,750 mg in sodium chloride 0.9 % 500 mL intermittent infusion        Placed in \"Followed by\" Linked Group    1,750 mg  over 2 Hours Intravenous ONCE 08/10/24 2114      08/10/24 1900  piperacillin-tazobactam (ZOSYN) 3.375 g vial to attach to  mL bag        Note to Pharmacy: For [facility], [facility] and [facility]: For Zosyn-naive patients, use the \"Zosyn initial dose + extended infusion\" order panel.    3.375 g  over 240 Minutes Intravenous EVERY 8 HOURS 08/10/24 1354              Contrast Orders - past 72 hours (72h ago, onward)      Start     Dose/Rate Route Frequency Stop    08/10/24 1830  gadobutrol (GADAVIST) injection 7 mL         7 mL Intravenous ONCE 08/10/24 1805            [employer] Prediction of Planned Initial Vancomycin Regimen    Loading dose: 1750 mg at 21:00 08/10/2024.  Regimen: 1250 mg IV every 12 hours.  Start time: 09:00 on 2024  Exposure target: AUC24 (range)400-600 mg/L.hr   AUC24,ss: 467 mg/L.hr  Probability of AUC24 > 400: 66 %  Ctrough,ss: 13.3 mg/L  Probability of Ctrough,ss > 20: " 22 %  Probability of nephrotoxicity (Lodise MO 2009): 8 %          Plan:  Start vancomycin  1750 mg IV once then 1250 mg q12 hrs after   Vancomycin monitoring method: AUC  Vancomycin therapeutic monitoring goal: 400-600 mg*h/L  Pharmacy will check vancomycin levels as appropriate in 1-3 Days.    Serum creatinine levels will be ordered daily for the first week of therapy and at least twice weekly for subsequent weeks.      Jasmyn Thomas, MIKEH

## 2024-08-11 NOTE — PLAN OF CARE
Problem: Adult Inpatient Plan of Care  Goal: Absence of Hospital-Acquired Illness or Injury  Intervention: Prevent Infection  Recent Flowsheet Documentation  Taken 8/11/2024 0010 by Jacinta Jorge RN  Infection Prevention:   hand hygiene promoted   rest/sleep promoted   single patient room provided     Problem: Skin or Soft Tissue Infection  Goal: Absence of Infection Signs and Symptoms  Intervention: Minimize and Manage Infection Progression  Recent Flowsheet Documentation  Taken 8/11/2024 0010 by Jacinta Jorge RN  Infection Prevention:   hand hygiene promoted   rest/sleep promoted   single patient room provided   Goal Outcome Evaluation:      Plan of Care Reviewed With: patient    Overall Patient Progress: no change     Scheduled IV Zosyn given to treat left foot cellulitis. MD added IV Vancomycin for strong evidence of osteomyelitis in left great toe. Left foot warm, red with mild edema. Bilateral foot numbness & tingling, occasional tenderness due to peripheral neuropathy. Declined PRN pain medication. Blood sugar 265 @ 0202.

## 2024-08-12 ENCOUNTER — APPOINTMENT (OUTPATIENT)
Dept: ULTRASOUND IMAGING | Facility: HOSPITAL | Age: 54
DRG: 629 | End: 2024-08-12
Attending: INTERNAL MEDICINE

## 2024-08-12 ENCOUNTER — TELEPHONE (OUTPATIENT)
Dept: PODIATRY | Facility: CLINIC | Age: 54
End: 2024-08-12

## 2024-08-12 LAB
ALBUMIN SERPL BCG-MCNC: 3.3 G/DL (ref 3.5–5.2)
ANION GAP SERPL CALCULATED.3IONS-SCNC: 9 MMOL/L (ref 7–15)
BASOPHILS # BLD AUTO: 0 10E3/UL (ref 0–0.2)
BASOPHILS NFR BLD AUTO: 0 %
BUN SERPL-MCNC: 15.3 MG/DL (ref 6–20)
CALCIUM SERPL-MCNC: 8.6 MG/DL (ref 8.8–10.4)
CHLORIDE SERPL-SCNC: 103 MMOL/L (ref 98–107)
CREAT SERPL-MCNC: 0.65 MG/DL (ref 0.67–1.17)
EGFRCR SERPLBLD CKD-EPI 2021: >90 ML/MIN/1.73M2
EOSINOPHIL # BLD AUTO: 0.1 10E3/UL (ref 0–0.7)
EOSINOPHIL NFR BLD AUTO: 2 %
ERYTHROCYTE [DISTWIDTH] IN BLOOD BY AUTOMATED COUNT: 12.7 % (ref 10–15)
GLUCOSE BLDC GLUCOMTR-MCNC: 251 MG/DL (ref 70–99)
GLUCOSE BLDC GLUCOMTR-MCNC: 256 MG/DL (ref 70–99)
GLUCOSE BLDC GLUCOMTR-MCNC: 284 MG/DL (ref 70–99)
GLUCOSE BLDC GLUCOMTR-MCNC: 339 MG/DL (ref 70–99)
GLUCOSE BLDC GLUCOMTR-MCNC: 348 MG/DL (ref 70–99)
GLUCOSE SERPL-MCNC: 255 MG/DL (ref 70–99)
HCO3 SERPL-SCNC: 26 MMOL/L (ref 22–29)
HCT VFR BLD AUTO: 38.4 % (ref 40–53)
HGB BLD-MCNC: 12.3 G/DL (ref 13.3–17.7)
IMM GRANULOCYTES # BLD: 0 10E3/UL
IMM GRANULOCYTES NFR BLD: 1 %
LYMPHOCYTES # BLD AUTO: 1.6 10E3/UL (ref 0.8–5.3)
LYMPHOCYTES NFR BLD AUTO: 24 %
MCH RBC QN AUTO: 29.8 PG (ref 26.5–33)
MCHC RBC AUTO-ENTMCNC: 32 G/DL (ref 31.5–36.5)
MCV RBC AUTO: 93 FL (ref 78–100)
MONOCYTES # BLD AUTO: 0.8 10E3/UL (ref 0–1.3)
MONOCYTES NFR BLD AUTO: 12 %
NEUTROPHILS # BLD AUTO: 4.1 10E3/UL (ref 1.6–8.3)
NEUTROPHILS NFR BLD AUTO: 61 %
NRBC # BLD AUTO: 0 10E3/UL
NRBC BLD AUTO-RTO: 0 /100
PHOSPHATE SERPL-MCNC: 3.7 MG/DL (ref 2.5–4.5)
PLAT MORPH BLD: ABNORMAL
PLATELET # BLD AUTO: 283 10E3/UL (ref 150–450)
POTASSIUM SERPL-SCNC: 4 MMOL/L (ref 3.4–5.3)
RBC # BLD AUTO: 4.13 10E6/UL (ref 4.4–5.9)
RBC MORPH BLD: ABNORMAL
SODIUM SERPL-SCNC: 138 MMOL/L (ref 135–145)
VANCOMYCIN SERPL-MCNC: 10.2 UG/ML
VARIANT LYMPHS BLD QL SMEAR: PRESENT
WBC # BLD AUTO: 6.6 10E3/UL (ref 4–11)

## 2024-08-12 PROCEDURE — 99232 SBSQ HOSP IP/OBS MODERATE 35: CPT | Performed by: HOSPITALIST

## 2024-08-12 PROCEDURE — 258N000003 HC RX IP 258 OP 636: Performed by: HOSPITALIST

## 2024-08-12 PROCEDURE — 36415 COLL VENOUS BLD VENIPUNCTURE: CPT | Performed by: HOSPITALIST

## 2024-08-12 PROCEDURE — 120N000001 HC R&B MED SURG/OB

## 2024-08-12 PROCEDURE — 99231 SBSQ HOSP IP/OBS SF/LOW 25: CPT | Performed by: PODIATRIST

## 2024-08-12 PROCEDURE — 93922 UPR/L XTREMITY ART 2 LEVELS: CPT

## 2024-08-12 PROCEDURE — 85025 COMPLETE CBC W/AUTO DIFF WBC: CPT | Performed by: HOSPITALIST

## 2024-08-12 PROCEDURE — 97605 NEG PRS WND THER DME<=50SQCM: CPT

## 2024-08-12 PROCEDURE — 93925 LOWER EXTREMITY STUDY: CPT

## 2024-08-12 PROCEDURE — 80202 ASSAY OF VANCOMYCIN: CPT | Performed by: HOSPITALIST

## 2024-08-12 PROCEDURE — 99232 SBSQ HOSP IP/OBS MODERATE 35: CPT | Performed by: INTERNAL MEDICINE

## 2024-08-12 PROCEDURE — 80069 RENAL FUNCTION PANEL: CPT | Performed by: HOSPITALIST

## 2024-08-12 PROCEDURE — 250N000013 HC RX MED GY IP 250 OP 250 PS 637: Performed by: HOSPITALIST

## 2024-08-12 PROCEDURE — 250N000011 HC RX IP 250 OP 636: Performed by: HOSPITALIST

## 2024-08-12 RX ORDER — CEFAZOLIN SODIUM 1 G/50ML
1750 SOLUTION INTRAVENOUS EVERY 12 HOURS
Status: DISCONTINUED | OUTPATIENT
Start: 2024-08-12 | End: 2024-08-15 | Stop reason: ALTCHOICE

## 2024-08-12 RX ADMIN — SODIUM CHLORIDE 1750 MG: 9 INJECTION, SOLUTION INTRAVENOUS at 09:30

## 2024-08-12 RX ADMIN — GABAPENTIN 100 MG: 100 CAPSULE ORAL at 14:31

## 2024-08-12 RX ADMIN — SODIUM CHLORIDE 1750 MG: 9 INJECTION, SOLUTION INTRAVENOUS at 21:36

## 2024-08-12 RX ADMIN — GABAPENTIN 100 MG: 100 CAPSULE ORAL at 08:08

## 2024-08-12 RX ADMIN — GABAPENTIN 100 MG: 100 CAPSULE ORAL at 19:36

## 2024-08-12 RX ADMIN — INSULIN ASPART 5 UNITS: 100 INJECTION, SOLUTION INTRAVENOUS; SUBCUTANEOUS at 13:00

## 2024-08-12 RX ADMIN — INSULIN ASPART 3 UNITS: 100 INJECTION, SOLUTION INTRAVENOUS; SUBCUTANEOUS at 08:28

## 2024-08-12 RX ADMIN — INSULIN ASPART 4 UNITS: 100 INJECTION, SOLUTION INTRAVENOUS; SUBCUTANEOUS at 18:04

## 2024-08-12 RX ADMIN — PIPERACILLIN AND TAZOBACTAM 3.38 G: 3; .375 INJECTION, POWDER, FOR SOLUTION INTRAVENOUS at 19:36

## 2024-08-12 RX ADMIN — ATORVASTATIN CALCIUM 10 MG: 10 TABLET, FILM COATED ORAL at 21:36

## 2024-08-12 RX ADMIN — INSULIN GLARGINE 5 UNITS: 100 INJECTION, SOLUTION SUBCUTANEOUS at 09:26

## 2024-08-12 RX ADMIN — ACETAMINOPHEN 650 MG: 325 TABLET ORAL at 08:07

## 2024-08-12 RX ADMIN — PIPERACILLIN AND TAZOBACTAM 3.38 G: 3; .375 INJECTION, POWDER, FOR SOLUTION INTRAVENOUS at 11:17

## 2024-08-12 RX ADMIN — PIPERACILLIN AND TAZOBACTAM 3.38 G: 3; .375 INJECTION, POWDER, FOR SOLUTION INTRAVENOUS at 03:35

## 2024-08-12 ASSESSMENT — ACTIVITIES OF DAILY LIVING (ADL)
ADLS_ACUITY_SCORE: 19
ADLS_ACUITY_SCORE: 19
ADLS_ACUITY_SCORE: 23
ADLS_ACUITY_SCORE: 23
ADLS_ACUITY_SCORE: 19
ADLS_ACUITY_SCORE: 19
ADLS_ACUITY_SCORE: 23
ADLS_ACUITY_SCORE: 23
ADLS_ACUITY_SCORE: 19
ADLS_ACUITY_SCORE: 19
ADLS_ACUITY_SCORE: 23
ADLS_ACUITY_SCORE: 23
ADLS_ACUITY_SCORE: 20
ADLS_ACUITY_SCORE: 23
ADLS_ACUITY_SCORE: 19
ADLS_ACUITY_SCORE: 23
ADLS_ACUITY_SCORE: 19

## 2024-08-12 NOTE — PLAN OF CARE
Problem: Pain Acute  Goal: Optimal Pain Control and Function  Outcome: Progressing  Intervention: Develop Pain Management Plan  Recent Flowsheet Documentation  Taken 8/12/2024 0807 by Mckenzie Knigth RN  Pain Management Interventions: medication (see MAR)   Goal Outcome Evaluation:       Patients only complaint of pain was a headache this morning.  PRN tylenol given with good relief.  Patient states LLE is still numb.  NWB left foot.  Dressing clean, dry and intact.  CHELSEY US done.  Waiting for wound vac placement by WO this afternoon.

## 2024-08-12 NOTE — PLAN OF CARE
Problem: Skin or Soft Tissue Infection  Goal: Absence of Infection Signs and Symptoms  Outcome: Progressing  Intervention: Minimize and Manage Infection Progression  Recent Flowsheet Documentation  Taken 8/12/2024 0230 by Jacinta Jorge RN  Infection Prevention:   hand hygiene promoted   rest/sleep promoted   single patient room provided     Problem: Comorbidity Management  Goal: Blood Glucose Levels Within Targeted Range  Outcome: Progressing     Problem: Pain Acute  Goal: Optimal Pain Control and Function  Outcome: Progressing     Goal Outcome Evaluation:    POD 1: Left foot I & D    Left foot dressing clean, dry and intact. Elevated on pillows. Denied pain. Scheduled IV Zosyn given. NWB to LLE. Strict bedrest.  Bilateral CHELSEY ultrasound ordered.

## 2024-08-12 NOTE — PROGRESS NOTES
FOOT AND ANKLE SURGERY/PODIATRY Progress Note        ASSESSMENT/PLAN:   Osteomyelitis left great toe  S/P I&D left hallux    Continue same.  Recommend wound VAC to facilitate wound healing of the left great toe.  Wound cultures pending.  Medical management per hospitalist.  Will follow.  Please call with questions.    Objective findings: The dressings were clean dry and intact.  No sign of active drainage or bleeding.  Neurovascular status is intact.      HPI: Petar Fermin was seen again today resting comfortably.  The patient had no complaints.  He indicated he has no pain secondary to neuropathy.  The patient is aware that a wound VAC will be necessary to facilitate wound healing.      Past Medical History:   Diagnosis Date    DM2 (diabetes mellitus, type 2) (H)         Past Surgical History:   Procedure Laterality Date    IRRIGATION AND DEBRIDEMENT FOOT, COMBINED Left 8/11/2024    Procedure: IRRIGATION AND DEBRIDEMENT, LEFT FOOT;  Surgeon: Prabhakar Waters DPM;  Location: [facility] [place] OR       No Known Allergies      Current Facility-Administered Medications:     acetaminophen (TYLENOL) tablet 650 mg, 650 mg, Oral, Q4H PRN, 650 mg at 08/12/24 0807 **OR** acetaminophen (TYLENOL) Suppository 650 mg, 650 mg, Rectal, Q4H PRN, Neel Hedrick MD    atorvastatin (LIPITOR) tablet 10 mg, 10 mg, Oral, QPM, Neel Hedrick MD, 10 mg at 08/11/24 2158    calcium carbonate (TUMS) chewable tablet 1,000 mg, 1,000 mg, Oral, 4x Daily PRN, Neel Hedrick MD    glucose gel 15-30 g, 15-30 g, Oral, Q15 Min PRN **OR** dextrose 50 % injection 25-50 mL, 25-50 mL, Intravenous, Q15 Min PRN **OR** glucagon injection 1 mg, 1 mg, Subcutaneous, Q15 Min PRN, Neel Hedrick MD    gabapentin (NEURONTIN) capsule 100 mg, 100 mg, Oral, TID, Neel Hedrick MD, 100 mg at 08/12/24 0808    HYDROmorphone (DILAUDID) half-tab 1 mg, 1 mg, Oral, Q4H PRN, Neel Hedrick MD    HYDROmorphone (DILAUDID) tablet 2 mg, 2 mg, Oral, Q4H PRN,  Neel Hedrick MD    insulin aspart (NovoLOG) injection (RAPID ACTING), , Subcutaneous, TID w/meals, Neel Hedrick MD, 1 Units at 08/12/24 0926    insulin aspart (NovoLOG) injection (RAPID ACTING), 1-7 Units, Subcutaneous, TID AC, Neel Hedrick MD, 3 Units at 08/12/24 0828    insulin aspart (NovoLOG) injection (RAPID ACTING), 1-5 Units, Subcutaneous, At Bedtime, Neel Hedrick MD, 4 Units at 08/11/24 2145    insulin glargine (LANTUS PEN) injection 5 Units, 5 Units, Subcutaneous, Daily, Neel Hedrick MD, 5 Units at 08/12/24 0926    lidocaine (LMX4) cream, , Topical, Q1H PRN, Neel Hedrick MD    lidocaine 1 % 0.1-1 mL, 0.1-1 mL, Other, Q1H PRN, Neel Hedrick MD    naloxone (NARCAN) injection 0.2 mg, 0.2 mg, Intravenous, Q2 Min PRN **OR** naloxone (NARCAN) injection 0.4 mg, 0.4 mg, Intravenous, Q2 Min PRN **OR** naloxone (NARCAN) injection 0.2 mg, 0.2 mg, Intramuscular, Q2 Min PRN **OR** naloxone (NARCAN) injection 0.4 mg, 0.4 mg, Intramuscular, Q2 Min PRN, Neel Hedrick MD    piperacillin-tazobactam (ZOSYN) 3.375 g vial to attach to  mL bag, 3.375 g, Intravenous, Q8H, Neel Hedrick MD, 3.375 g at 08/12/24 1117    polyethylene glycol (MIRALAX) Packet 17 g, 17 g, Oral, BID PRN, Neel Hedrick MD    senna-docusate (SENOKOT-S/PERICOLACE) 8.6-50 MG per tablet 1 tablet, 1 tablet, Oral, BID PRN **OR** senna-docusate (SENOKOT-S/PERICOLACE) 8.6-50 MG per tablet 2 tablet, 2 tablet, Oral, BID PRN, Neel Hedrick MD    sodium chloride (PF) 0.9% PF flush 3 mL, 3 mL, Intracatheter, Q8H, Neel Hedrick MD, 3 mL at 08/11/24 2145    sodium chloride (PF) 0.9% PF flush 3 mL, 3 mL, Intracatheter, q1 min prn, Neel Hedrick MD    sodium chloride 0.9% (bottle) irrigation, , , PRN, Prabhakar Waters DPM, 1,000 mL at 08/11/24 1800    vancomycin (VANCOCIN) 1,750 mg in sodium chloride 0.9 % 500 mL intermittent infusion, 1,750 mg, Intravenous, Q12H, Neel Hedrick MD, 1,750 mg at 08/12/24  "0930    History reviewed. No pertinent family history.    Social History     Socioeconomic History    Marital status:      Spouse name: Not on file    Number of children: Not on file    Years of education: Not on file    Highest education level: Not on file   Occupational History    Not on file   Tobacco Use    Smoking status: Never    Smokeless tobacco: Never   Substance and Sexual Activity    Alcohol use: Not on file    Drug use: Not on file    Sexual activity: Not on file   Other Topics Concern    Not on file   Social History Narrative    Not on file     Social Determinants of Health     Financial Resource Strain: Not on file   Food Insecurity: Not on file   Transportation Needs: Not on file   Physical Activity: Not on file   Stress: Not on file   Social Connections: Not on file   Interpersonal Safety: Not on file   Housing Stability: Not on file          /69 (BP Location: Left arm)   Pulse 73   Temp 98.2  F (36.8  C) (Oral)   Resp 18   Ht 1.651 m (5' 5\")   Wt 76.2 kg (168 lb)   SpO2 96%   BMI 27.96 kg/m      BMI= Body mass index is 27.96 kg/m .    OBJECTIVE:      Imaging:         POC US Guidance Needle Placement    Result Date: 8/11/2024  Ultrasound was performed as guidance to an anesthesia procedure.  Click \"PACS images\" hyperlink below to view any stored images.  For specific procedure details, view procedure note authored by anesthesia.    MR Foot Left w/o & w Contrast    Result Date: 8/10/2024  EXAM: MR FOOT LEFT W/O and W CONTRAST LOCATION: [facility] DATE: 8/10/2024 INDICATION: Diabetic foot infection, had preceding penetrating injury. COMPARISON: Radiographs from 8/10/2024. TECHNIQUE: Routine. Additional postgadolinium T1 sequences were obtained. IV CONTRAST: 7ml Gadavist FINDINGS: JOINTS AND BONES: -There is bone marrow edema like signal and enhancement and loss of T1 signal in the distal phalanx of the great toe. No effusion or synovitis. The bones are " normal otherwise. The joints appear normal. TENDONS: -No tendon tear, tendinopathy, or tenosynovitis. LIGAMENTS: -Lisfranc ligament: Intact. No subluxation. MUSCLES AND SOFT TISSUES: -There is a tract of abnormal signal extending from the distal plantar skin surface of the great toe proximally and dorsally to near the distal tuft, as seen on image 5 of series 9 and image 17 of series 6 and image 31 of series 5. This may represent the  tract of a penetrating injury. There is no definite foreign body or abscess. There is adjacent edema and enhancement consistent with cellulitis.     IMPRESSION: 1.  There appears to be a tract from a previous penetrating injury in the distal end of the great toe extending down close to the distal tuft of the distal phalanx of the great toe. 2.  There is underlying bone marrow edema like signal and enhancement as well as loss of T1 signal in the distal phalanx of the great toe, strong evidence of osteomyelitis. 3.  There is no evidence of abscess, tenosynovitis, myositis or septic joint.    Foot XR, G/E 3 views, left    Result Date: 8/10/2024  EXAM: XR FOOT LEFT G/E 3 VIEWS LOCATION: [facility] DATE: 8/10/2024 INDICATION: Swelling and redness. Evaluate for retained foreign body in the left big toe. COMPARISON: None.     IMPRESSION: There is no evidence of an acute fracture with attention to the great toe. There is some soft tissue swelling and irregularity however there is no discrete radiopaque foreign body identified. No significant joint space narrowing. No cortical destructive change to suggest osteomyelitis.             Dante Robbins; SILVIA  [facility] Foot & Ankle Surgery/Podiatry

## 2024-08-12 NOTE — PROGRESS NOTES
[facility]    Medicine Progress Note - Hospitalist Service    Date of Admission:  8/10/2024    Assessment & Plan   Petar Fermin is a 54 year old male admitted on 8/10/2024. He is being admitted for left foot cellulitis.       #Left foot cellulitis, diabetic foot infection  #Left great toe osteomyelitis  -Duration of symptoms unclear as patient does not recall initial injury, infectious symptoms seem to have acutely worsened 3 days prior to admission when patient manipulated his wound  -Mild fevers but otherwise nontoxic on admission  -Left foot MRI 8/10 showing osteomyelitis changes left great toe  -Podiatry consult, plan for limited debridement 8/11  -Wound vac placed 8/12 per podiatry  -ID consult 8/11  -Continue with Vanco and Zosyn, wound culture polymicrobial  -Wound care consult  -Follow-up cultures     #Type 2 diabetes mellitus complicated by hyperglycemia and peripheral neuropathy  -A1c over 13%, blood sugar in the 300s on admission likely from infectious stress  -Routinely takes metformin, holding  -Moderate correctional scale ordered, titrate as needed  -5 units Lantus started 8/11     #Hyperlipidemia  -Resume usual atorvastatin          Diet: Advance Diet as Tolerated: Regular Diet Adult; Moderate Consistent Carb (60 g CHO per Meal) Diet    DVT Prophylaxis: Pneumatic Compression Devices and Ambulate every shift  Martinez Catheter: Not present  Lines: None     Cardiac Monitoring: None  Code Status: Full Code      Clinically Significant Risk Factors          # Hypocalcemia: Lowest Ca = 8.2 mg/dL in last 2 days, will monitor and replace as appropriate     # Hypoalbuminemia: Lowest albumin = 3.3 g/dL at 8/12/2024  5:21 AM, will monitor as appropriate              # DMII: A1C = 13.3 % (Ref range: <5.7 %) within past 6 months, PRESENT ON ADMISSION  # Overweight: Estimated body mass index is 27.96 kg/m  as calculated from the following:    Height as of this encounter: 1.651 m (5'  "5\").    Weight as of this encounter: 76.2 kg (168 lb)., PRESENT ON ADMISSION                  Disposition Plan     Medically Ready for Discharge: Anticipated in 2-4 Days             Neel Hedrick MD  Hospitalist Service  [facility]  Securely message with [employer] (more info)  Text page via fflick Paging/Directory   ______________________________________________________________________    Interval History   Feels fine overall, no increased pain, no new swelling, no fevers.    Physical Exam   Vital Signs: Temp: 98.2  F (36.8  C) Temp src: Oral BP: 126/69 Pulse: 73   Resp: 18 SpO2: 96 % O2 Device: None (Room air) Oxygen Delivery: 2 LPM  Weight: 168 lbs 0 oz    Well-appearing, no acute distress, membranes moist, heart rate regular, breathing nonlabored, abdomen soft, legs without edema, left lower extremity bandaged without bleeding or drainage noted, normal affect and mood    Medical Decision Making       46 MINUTES SPENT BY ME on the date of service doing chart review, history, exam, documentation & further activities per the note.  NOTE(S)/MEDICAL RECORDS REVIEWED over the past 24 hours: Vitals, labs, new imaging, documentation and medication administrations       Data     I have personally reviewed the following data over the past 24 hrs:    6.6  \   12.3 (L)   / 283     138 103 15.3 /  348 (H)   4.0 26 0.65 (L) \     ALT: N/A AST: N/A AP: N/A TBILI: N/A   ALB: 3.3 (L) TOT PROTEIN: N/A LIPASE: N/A       Imaging results reviewed over the past 24 hrs:   Recent Results (from the past 24 hour(s))   POC US Guidance Needle Placement    Narrative    Ultrasound was performed as guidance to an anesthesia procedure.  Click   \"PACS images\" hyperlink below to view any stored images.  For specific   procedure details, view procedure note authored by anesthesia.   US CHELSEY with PPG wo Exercise    Narrative    [facility] HOS    1. EXAM: RESTING ANKLE-BRACHIAL INDICES (ABIs)   2. DUPLEX " ARTERIAL ULTRASOUND OF THE LOWER EXTREMITIES BILATERALLY  8/12/2024 2:23 PM CDT    INDICATION: Diabetic foot infection.  TECHNIQUE: Duplex imaging is performed utilizing gray-scale, two-dimensional images and color-flow imaging. Doppler waveform analysis and spectral Doppler imaging is also performed.  COMPARISON: None    FINDINGS:     SEGMENTAL BP  RIGHT  Brachial: --  Ankle (PT): 167; Index: 1.27  Ankle (DP): 160; Index: 1.22  Digit: 125; Index: 0.95    LEFT  Brachial: 131  Ankle (PT): 156; Index: 1.19  Ankle (DP): 148; Index: 1.13  Digit: 92; Index: 0.70    Resting ABIs are 1.3 on the right.    Resting ABIs are  1.2 on the left.    WAVEFORMS: Triphasic waveforms bilaterally. Normal waveforms and toe pressures in the bilateral toes.    DUPLEX ARTERIAL ULTRASOUND FINDINGS:  (Peak Systole/End Diastole)  RIGHT (cm/s)  EIA: 147/23  CFA: 95/17  PFA: 68/--  SFA-Proximal: 99/17  SFA-Mid: 91/15  SFA-Distal: 71/14  Popliteal-Proximal: 65/14   Popliteal-Distal: 98/21   PTA: 84/22  KIRAN: 80/24  DPA: 40/13    LEFT (cm/s)  EIA: 146/22  CFA: 95/11  PFA: 87/10  SFA-Proximal: 98/13  SFA-Mid: 110/15  SFA-Distal: 103/19  Popliteal-Proximal: 104/21   Popliteal-Distal: 117/16   PTA: 126/36  KIRAN: 130/39  DPA: 41/10    RIGHT: Right external iliac artery, common femoral artery, profunda femoral artery, SFA and popliteal arteries are widely patent with normal velocities and multiphasic waveforms. The right PTA, KIRAN and DPA are patent with multiphasic waveforms.    LEFT: The left external iliac artery, common femoral artery, profunda femoral artery, SFA and popliteal arteries are widely patent with normal velocities and multiphasic waveforms. Left PTA and KIRAN/DPA are patent with normal velocities and multiphasic   waveforms.      Impression    IMPRESSION:  1.  RIGHT LOWER EXTREMITY: Right leg resting CHELSEY of 1.3 is within normal limits. No significant stenosis within the right femoral-popliteal segments. The right PTA, KIRAN and DPA are  patent with multiphasic waveforms.  2.  LEFT LOWER EXTREMITY: Left leg resting CHELSEY of 1.2 is within normal limits.  No significant stenosis within the left femoral-popliteal segments. The left PTA, KIRAN and DPA are patent with multiphasic waveforms.   US Lower Extremity Arterial Duplex Bilateral    Narrative    [facility] HOS    1. EXAM: RESTING ANKLE-BRACHIAL INDICES (ABIs)   2. DUPLEX ARTERIAL ULTRASOUND OF THE LOWER EXTREMITIES BILATERALLY  8/12/2024 2:23 PM CDT    INDICATION: Diabetic foot infection.  TECHNIQUE: Duplex imaging is performed utilizing gray-scale, two-dimensional images and color-flow imaging. Doppler waveform analysis and spectral Doppler imaging is also performed.  COMPARISON: None    FINDINGS:     SEGMENTAL BP  RIGHT  Brachial: --  Ankle (PT): 167; Index: 1.27  Ankle (DP): 160; Index: 1.22  Digit: 125; Index: 0.95    LEFT  Brachial: 131  Ankle (PT): 156; Index: 1.19  Ankle (DP): 148; Index: 1.13  Digit: 92; Index: 0.70    Resting ABIs are 1.3 on the right.    Resting ABIs are  1.2 on the left.    WAVEFORMS: Triphasic waveforms bilaterally. Normal waveforms and toe pressures in the bilateral toes.    DUPLEX ARTERIAL ULTRASOUND FINDINGS:  (Peak Systole/End Diastole)  RIGHT (cm/s)  EIA: 147/23  CFA: 95/17  PFA: 68/--  SFA-Proximal: 99/17  SFA-Mid: 91/15  SFA-Distal: 71/14  Popliteal-Proximal: 65/14   Popliteal-Distal: 98/21   PTA: 84/22  KIRAN: 80/24  DPA: 40/13    LEFT (cm/s)  EIA: 146/22  CFA: 95/11  PFA: 87/10  SFA-Proximal: 98/13  SFA-Mid: 110/15  SFA-Distal: 103/19  Popliteal-Proximal: 104/21   Popliteal-Distal: 117/16   PTA: 126/36  KIRAN: 130/39  DPA: 41/10    RIGHT: Right external iliac artery, common femoral artery, profunda femoral artery, SFA and popliteal arteries are widely patent with normal velocities and multiphasic waveforms. The right PTA, KIRAN and DPA are patent with multiphasic waveforms.    LEFT: The left external iliac artery, common femoral artery, profunda femoral  artery, SFA and popliteal arteries are widely patent with normal velocities and multiphasic waveforms. Left PTA and KIRAN/DPA are patent with normal velocities and multiphasic   waveforms.      Impression    IMPRESSION:  1.  RIGHT LOWER EXTREMITY: Right leg resting CHELSEY of 1.3 is within normal limits. No significant stenosis within the right femoral-popliteal segments. The right PTA, KIRAN and DPA are patent with multiphasic waveforms.  2.  LEFT LOWER EXTREMITY: Left leg resting CHELSEY of 1.2 is within normal limits.  No significant stenosis within the left femoral-popliteal segments. The left PTA, KIRAN and DPA are patent with multiphasic waveforms.

## 2024-08-12 NOTE — TELEPHONE ENCOUNTER
Home-going Instructions After Device Implant    Incision: Please keep your incision dry and/or open to air for one week after implant  (date:______11/10/2023_________).  _______ If you have Dermabond, please keep your incision dry for 1 week. Do not remove  Dermabond. It will peel off by itself.  _______If you have steri strips, please keep your incision dry for 1 week. Steri strips may be  removed after one week or they will fall off on their own.  _______ If you have Aquacel (flesh-colored bandage), please leave on for one week. Do not  remove the steri strips, they will fall off on their own    Call the Device Clinic at [phone] if you have any questions about your instructions,  Monday-Friday between the hours of 7:00 am - 4:30 pm. After hours please call your  physician’s office.    After one week, you may wash the site with soap and water. Inspect your incision each day. If  you note increased redness, swelling, or drainage, or if you develop a fever, please call your  doctor IMMEDIATELY.  Your Doctor:_____Dr JACKIE Sands or Dr WALKER Pemberton_______________ Telephone:_______[phone]________________    Pain: It is normal for the area around the incision to be tender for a few weeks following  surgery. Pain relievers such as Tylenol or Motrin are usually sufficient for pain relief. The pain  should get better each day, if it gets worse, please contact your doctor.    Activity Restrictions: 4 weeks new device (date:___11/10/2023_____).   Avoid gross arm  movement on the side of your new implant. Do not raise or stretch your arm above shoulder  level. Do not pick-up weights greater than 15 lbs. Avoid activities such as golf or swimming for  six weeks.    ID Card: It is important that you carry your device ID card with you at all times. Inform all  doctors and healthcare providers that you have a pacemaker or defibrillator. Until the lead wires  are completely healed in your heart, a prophylactic antibiotic may  Type of surgery: IRRIGATION AND DEBRIDEMENT, FOOT (Left)   Location of surgery: Other: [facility]  Date and time of surgery: 08/11/2024  Surgeon: ESTEFANIA  Pre-Op Appt Date: SEEN ON CALL   Post-Op Appt Date: TBD    Packet sent out: No  Pre-cert/Authorization completed:  No  Date:    need to be given to you prior  to procedures such as deep cleaning or extraction of teeth.    Electromagnetic Interference: Microwave ovens are safe to use. Cellular telephones should  be held to the ear that is opposite your device. If you are scheduled for a MRI, please notify the  Device Clinic to check if you have a compatible device. Present your device ID card to the  airport . The detector wand may be used below waist level and to inspect your  shoes. Read the patient booklet for more information. You may call the device clinic or the  patient services department of the device  with questions about specific electrical  appliances and interference problems.    It is your responsibility to make and keep appointments. After each visit on your way out,  make an appointment for your next visit. Please follow the recommendations of your  doctor for the frequency of appointments.    You will follow at [facility]  -- The same place that you see Dr Sands and you will now see Dr Pemberton      Appointment [phone]     Device Clinic: [phone] (this is Not an  emergency number)    Frequent Questions from ICD Patients    What Does a Shock Feel Like?  - Many patients describe a shock as a hard punch or kick to the chest. The discomfort is over  quickly and does not remain like an actual punch or kick.    What Should I Do If I Get a Shock?  - Remain calm -your ICD is working.  - If you are feeling well after your shock, call your ICD doctor. If you are not feeling well, call  911 and seek emergency treatment.  - Family members should also know what to do if you get a shock.  - Establish an emergency plan with family members and friends. Instruction in CPR is  recommended.    Can I Travel?  -Always carry your ICD identification card.  -Ask to be hand searched at security checkpoints, such as at the airport or your local courthouse.    Hand wands are not a  substitute for a “pat down” search. Surveillance wands may be used  below your waist and on your shoes.  -If you are making an extended trip to another city, your ICD doctor may recommend a  physician or hospital that you can contact.    Can I Drive?  -Your doctor will specify any driving restrictions.    Other Points:  -Always notify healthcare providers, such as dentists and podiatrists, that you have an ICD. If  any surgery is planned for you, the anesthesia personnel must know in advance about your ICD.    Other Sources of Information:  Device Clinic Nurses: [phone]  Patient Services Department of the  of your ICD  The Internet holds an abundance of information. Some internet sites of interest:  [email or web address].[email or web address] Me  www.[email or web address] Me  [email or web address]  [email or web address] [email or web address] Me  [email or web address] Me    This info is a general resource. It is not meant to replace your health care provider’s advice.  Ask your doctor or health care team any questions. Always follow their instructions.

## 2024-08-12 NOTE — PLAN OF CARE
Problem: Skin or Soft Tissue Infection  Goal: Absence of Infection Signs and Symptoms  Outcome: Progressing     Problem: Comorbidity Management  Goal: Blood Glucose Levels Within Targeted Range  Outcome: Progressing     Problem: Infection  Goal: Absence of Infection Signs and Symptoms  Outcome: Progressing   Goal Outcome Evaluation:               Patient went down for I and D this shift. Returned with in two hours, slightly drowsy from anesthesia . Patient verbalized no pain form procedure on bilateral LE. LLE wrapped, dressing CDI. Patient diet advanced to regular, family brought in food, patient ate well. Blood sugars 123 prior to surgery and 361 post dinner at . Patient running IV vanco currently.

## 2024-08-12 NOTE — DISCHARGE INSTRUCTIONS
"WOC DISCHARGE INSTRUCTIONS:  Negative pressure wound therapy plan:  Wound location: left great toe   Change Days: M/Th  Supplies (including all accessories) used: small  Black foam , Adapt barrier ring, and Cavilon no sting barrier film  Cleanse with Normal saline prior to replacing NPWT  Suction setting: -125 mmHg  Methods used: Window paned all periwound skin with vac drape prior to applying sponge, Bridged trac pad off bony prominences, Spiral cut foam prior to packing into wound , and Placed barrier ring into periwound creases to improve seal    Staff RN to assess integrity of dressing and ensure suction is set at appropriate level every shift.   Date canister. Chart canister output every shift. Change cannister weekly and PRN if full/occluded     Remove foam dressing and replace with BID normal saline moist gauze dressing if   -a dressing failure which cannot be repaired within 2 hours     ALTERNATIVE PLAN OF CARE (if unable to continue NPWT):  Left great toe:  Cleanse with Vashe and ensure skin around wound is dry  Soak Nugauze 1/4\" strip with Vashe and pack gently  Cover with dry gauze and secure with tape  Change daily and as needed if drainage increases      "

## 2024-08-12 NOTE — CONSULTS
[facility] Nurse Inpatient Assessment     Consulted for: Left great toe    Patient History (according to provider note(s):      Per H+P:  54 year old male admitted on 8/10/2024. He is being admitted for left foot cellulitis.       #Left foot cellulitis, diabetic foot infection  #Left great toe osteomyelitis  -Duration of symptoms unclear as patient does not recall initial injury, infectious symptoms seem to have acutely worsened 3 days prior to admission when patient manipulated his wound  -Mild fevers but otherwise nontoxic on admission  -Left foot MRI 8/10 showing osteomyelitis changes left great toe  -Podiatry consult, plan for limited debridement 8/11  -Wound vac placed 8/12 per podiatry  -ID consult 8/11  -Continue with Vanco and Zosyn, wound culture polymicrobial  -Wound care consult  -Follow-up cultures    Assessment:      Areas visualized during today's visit:  left foot    Negative pressure wound therapy applied to: left great toe     Last photo: 8/12   Wound due to: Surgical Wound   Wound history/plan of care:    Surgical date: 8/11   Service following: podiatry  Date Negative Pressure Wound Therapy initiated: 8/12   Interventions in place: N/A  Is patient s nutritional status compromised? no   If yes, what interventions are in place? N/A  Reason for initiating vac therapy? Presence of co-morbidities  Which?of?the?following?co-morbidities?apply? Diabetes  If diabetic is patient on a diabetic management program? Yes   Is osteomyelitis present in wound? no   If yes what treatments are in place? N/A    Wound base: 100 % granulation tissue and adipose tissue     Palpation of the wound bed: normal       Drainage: small      Volume in cannister: NA - started today     Last cannister change date: 8/12     Description of drainage: serosanguinous      Measurements (length x width x depth, in cm) 3  x 1.5  x  1.5 cm       Tunneling N/A      Undermining N/A   Periwound skin: Intact        "Color: normal and consistent with surrounding tissue       Temperature: normal    Odor: none   Pain: denies   Treatment goal: Increase granulation  STATUS: initial assessment   Supplies ordered: gathered    Number of foam pieces removed from a wound (excluding foam for bridge) :  NA    Verified this matched the number of foam pieces applied last dressing change: N/A   Number of foam pieces packed into wound (excluding foam for bridge) :  1 GranuFoam Black        Treatment Plan:     Negative pressure wound therapy plan:  Wound location: left great toe   Change Days:  M/Th  by WOC RN    Supplies (including all accessories) used: small  Black foam , Adapt barrier ring, and Cavilon no sting barrier film  Cleanse with Normal saline prior to replacing NPWT  Suction setting: -125   Methods used: Window paned all periwound skin with vac drape prior to applying sponge, Bridged trac pad off bony prominences, Spiral cut foam prior to packing into wound , and Placed barrier ring into periwound creases to improve seal    Staff RN to assess integrity of dressing and ensure suction is set at appropriate level every shift.   Date canister. Chart canister output every shift. Change cannister weekly and PRN if full/occluded     Remove foam dressing and replace with BID normal saline moist gauze dressing if   -a dressing failure which cannot be repaired within 2 hours   -patient is discharging to home without a home pump   -patient is discharging to a facility outside the local area   -if a dressing is a \"Silver Foam\", remove before Radiation Therapy or MRI     The hospital VAC pump is not to be discharged with the patient.?Ensure to disconnect patient from machine prior to discharge. Then   If a home KCI VAC pump has been delivered, connect home cannister to dressing tubing then connect cannister to home pump and turn on machine    If transferring to a nearby facility with a KCI vac, can disconnect and clamp tubing then cover end " with glove so can be reconnected within 2 hours        Orders: Written    RECOMMEND PRIMARY TEAM ORDER: None, at this time  Education provided: plan of care  Discussed plan of care with: Patient and Nurse  [facility] nurse follow-up plan:  M/Th  Notify WO if wound(s) deteriorate.  Nursing to notify the Provider(s) and re-consult the [facility] Nurse if new skin concern.    DATA:     Current support surface: Standard  Standard Isoflex gel  Containment of urine/stool: Continent of bladder and Continent of bowel  BMI: Body mass index is 27.96 kg/m .   Active diet order: Orders Placed This Encounter      Advance Diet as Tolerated: Regular Diet Adult; Moderate Consistent Carb (60 g CHO per Meal) Diet     Output: No intake/output data recorded.     Labs:   Recent Labs   Lab 08/12/24  0521 08/11/24  0642 08/10/24  1245   ALBUMIN 3.3*   < >  --    HGB 12.3*  --  13.5   WBC 6.6  --  4.9   A1C  --   --  13.3*    < > = values in this interval not displayed.     Pressure injury risk assessment:   Sensory Perception: 3-->slightly limited  Moisture: 4-->rarely moist  Activity: 4-->walks frequently  Mobility: 4-->no limitation  Nutrition: 4-->excellent  Friction and Shear: 3-->no apparent problem  Gualberto Score: 22    VAL FelixN, RN, PHN, HNB-BC, CWOCN  Pager no longer is use, please contact through [facility] group: [facility]

## 2024-08-12 NOTE — PROGRESS NOTES
"Infectious Diseases Progress Note  [facility]    Date of visit: 08/12/2024     ASSESSMENT   54-year-old male with a history of poorly controlled diabetes who is admitted with left foot infection.    Left diabetic foot infection.  Patient identified foreign body.  Subsequent swelling, erythema, fevers.  MRI showing osteomyelitis at the distal phalanx.  S/p debridement 8/11 - patient declining amputation.  Wound culture on 8/10 is polymicrobial. Intra-op culture with K.pneumoniae and C.freundii. surrounding cellulitis improving   Poorly controlled diabetes.  A1c 13.3  ABIs normal bilaterally     Active Problems:    Cellulitis of left foot    Toe infection       PLAN   -Continue vancomycin and Zosyn  -Follow-up on intraoperative cultures      Magen Ramirez MD  [facility]  Direct messaging: [employer]  On-Call ID provider: [phone], option: 9      ===========================================    SUBJECTIVE / INTERVAL HISTORY:     No events. Wound vac placed today.      Antibiotics   Vancomycin 8/10-  Zosyn 8/10-    Previous:  none       Physical Exam     Temp:  [97.8  F (36.6  C)-98.8  F (37.1  C)] 98.2  F (36.8  C)  Pulse:  [63-81] 81  Resp:  [9-18] 18  BP: (101-154)/(59-92) 121/79  SpO2:  [95 %-100 %] 95 %    /79 (BP Location: Left arm)   Pulse 81   Temp 98.2  F (36.8  C) (Oral)   Resp 18   Ht 1.651 m (5' 5\")   Wt 76.2 kg (168 lb)   SpO2 95%   BMI 27.96 kg/m      GENERAL:  well-developed, well-nourished, lying in bed in no acute distress.   HENT:  Head is normocephalic, atraumatic.   EYES:  Eyes have anicteric sclerae without conjunctival injection   LUNGS:  normal resp pattern  EXT: Extremities warm and without edema.  left first toe with erythema improved. Wound vac in place  SKIN:  No acute rashes.    NEUROLOGIC:  Grossly nonfocal.      Cultures   8/10 blood culture: No growth to date  8/10 left toe culture: Klebsiella pneumoniae, Citrobacter freundii, group " G strep, Enterococcus faecalis  8/11 left toe: K.pna and C.freundii    Susceptibility data from last 90 days.  Collected Specimen Info Organism   08/11/24 Tissue from Toe, Left Citrobacter freundii complex     Klebsiella pneumoniae   08/10/24 Wound from Toe, Left Citrobacter freundii complex     Enterococcus faecalis     Klebsiella pneumoniae     Staphylococcus lugdunensis     Streptococcus canis (Group G Streptococcus)        Pertinent Labs:     Recent Labs   Lab 08/12/24  0521 08/10/24  1245   WBC 6.6 4.9   HGB 12.3* 13.5    259       Recent Labs   Lab 08/12/24  0521 08/11/24  0642 08/10/24  1245    136 136   CO2 26 25 28   BUN 15.3 12.8 13.8   ALBUMIN 3.3* 3.4*  --        Recent Labs   Lab 08/10/24  1245   CRPI 53.00*           Imaging:     US Lower Extremity Arterial Duplex Bilateral    Result Date: 8/12/2024  [facility] 1. EXAM: RESTING ANKLE-BRACHIAL INDICES (ABIs) 2. DUPLEX ARTERIAL ULTRASOUND OF THE LOWER EXTREMITIES BILATERALLY 8/12/2024 2:23 PM CDT INDICATION: Diabetic foot infection. TECHNIQUE: Duplex imaging is performed utilizing gray-scale, two-dimensional images and color-flow imaging. Doppler waveform analysis and spectral Doppler imaging is also performed. COMPARISON: None FINDINGS: SEGMENTAL BP RIGHT Brachial: -- Ankle (PT): 167; Index: 1.27 Ankle (DP): 160; Index: 1.22 Digit: 125; Index: 0.95 LEFT Brachial: 131 Ankle (PT): 156; Index: 1.19 Ankle (DP): 148; Index: 1.13 Digit: 92; Index: 0.70 Resting ABIs are 1.3 on the right. Resting ABIs are  1.2 on the left. WAVEFORMS: Triphasic waveforms bilaterally. Normal waveforms and toe pressures in the bilateral toes. DUPLEX ARTERIAL ULTRASOUND FINDINGS:  (Peak Systole/End Diastole) RIGHT (cm/s) EIA: 147/23 CFA: 95/17 PFA: 68/-- SFA-Proximal: 99/17 SFA-Mid: 91/15 SFA-Distal: 71/14 Popliteal-Proximal: 65/14 Popliteal-Distal: 98/21 PTA: 84/22 KIRAN: 80/24 DPA: 40/13 LEFT (cm/s) EIA: 146/22 CFA: 95/11 PFA: 87/10 SFA-Proximal:  98/13 SFA-Mid: 110/15 SFA-Distal: 103/19 Popliteal-Proximal: 104/21 Popliteal-Distal: 117/16 PTA: 126/36 KIRAN: 130/39 DPA: 41/10 RIGHT: Right external iliac artery, common femoral artery, profunda femoral artery, SFA and popliteal arteries are widely patent with normal velocities and multiphasic waveforms. The right PTA, KIRAN and DPA are patent with multiphasic waveforms. LEFT: The left external iliac artery, common femoral artery, profunda femoral artery, SFA and popliteal arteries are widely patent with normal velocities and multiphasic waveforms. Left PTA and KIRAN/DPA are patent with normal velocities and multiphasic waveforms.     IMPRESSION: 1.  RIGHT LOWER EXTREMITY: Right leg resting CHELSEY of 1.3 is within normal limits. No significant stenosis within the right femoral-popliteal segments. The right PTA, KIRAN and DPA are patent with multiphasic waveforms. 2.  LEFT LOWER EXTREMITY: Left leg resting CHELSEY of 1.2 is within normal limits.  No significant stenosis within the left femoral-popliteal segments. The left PTA, KIRAN and DPA are patent with multiphasic waveforms.    US CHELSEY with PPG wo Exercise    Result Date: 8/12/2024  [facility] 1. EXAM: RESTING ANKLE-BRACHIAL INDICES (ABIs) 2. DUPLEX ARTERIAL ULTRASOUND OF THE LOWER EXTREMITIES BILATERALLY 8/12/2024 2:23 PM CDT INDICATION: Diabetic foot infection. TECHNIQUE: Duplex imaging is performed utilizing gray-scale, two-dimensional images and color-flow imaging. Doppler waveform analysis and spectral Doppler imaging is also performed. COMPARISON: None FINDINGS: SEGMENTAL BP RIGHT Brachial: -- Ankle (PT): 167; Index: 1.27 Ankle (DP): 160; Index: 1.22 Digit: 125; Index: 0.95 LEFT Brachial: 131 Ankle (PT): 156; Index: 1.19 Ankle (DP): 148; Index: 1.13 Digit: 92; Index: 0.70 Resting ABIs are 1.3 on the right. Resting ABIs are  1.2 on the left. WAVEFORMS: Triphasic waveforms bilaterally. Normal waveforms and toe pressures in the bilateral toes. DUPLEX  "ARTERIAL ULTRASOUND FINDINGS:  (Peak Systole/End Diastole) RIGHT (cm/s) EIA: 147/23 CFA: 95/17 PFA: 68/-- SFA-Proximal: 99/17 SFA-Mid: 91/15 SFA-Distal: 71/14 Popliteal-Proximal: 65/14 Popliteal-Distal: 98/21 PTA: 84/22 KIRAN: 80/24 DPA: 40/13 LEFT (cm/s) EIA: 146/22 CFA: 95/11 PFA: 87/10 SFA-Proximal: 98/13 SFA-Mid: 110/15 SFA-Distal: 103/19 Popliteal-Proximal: 104/21 Popliteal-Distal: 117/16 PTA: 126/36 KIRAN: 130/39 DPA: 41/10 RIGHT: Right external iliac artery, common femoral artery, profunda femoral artery, SFA and popliteal arteries are widely patent with normal velocities and multiphasic waveforms. The right PTA, KIRAN and DPA are patent with multiphasic waveforms. LEFT: The left external iliac artery, common femoral artery, profunda femoral artery, SFA and popliteal arteries are widely patent with normal velocities and multiphasic waveforms. Left PTA and KIRAN/DPA are patent with normal velocities and multiphasic waveforms.     IMPRESSION: 1.  RIGHT LOWER EXTREMITY: Right leg resting CHELSEY of 1.3 is within normal limits. No significant stenosis within the right femoral-popliteal segments. The right PTA, KIRAN and DPA are patent with multiphasic waveforms. 2.  LEFT LOWER EXTREMITY: Left leg resting CHELSEY of 1.2 is within normal limits.  No significant stenosis within the left femoral-popliteal segments. The left PTA, KIRAN and DPA are patent with multiphasic waveforms.    POC US Guidance Needle Placement    Result Date: 8/11/2024  Ultrasound was performed as guidance to an anesthesia procedure.  Click \"PACS images\" hyperlink below to view any stored images.  For specific procedure details, view procedure note authored by anesthesia.    MR Foot Left w/o & w Contrast    Result Date: 8/10/2024  EXAM: MR FOOT LEFT W/O and W CONTRAST LOCATION: [facility] DATE: 8/10/2024 INDICATION: Diabetic foot infection, had preceding penetrating injury. COMPARISON: Radiographs from 8/10/2024. TECHNIQUE: Routine. " Additional postgadolinium T1 sequences were obtained. IV CONTRAST: 7ml Gadavist FINDINGS: JOINTS AND BONES: -There is bone marrow edema like signal and enhancement and loss of T1 signal in the distal phalanx of the great toe. No effusion or synovitis. The bones are normal otherwise. The joints appear normal. TENDONS: -No tendon tear, tendinopathy, or tenosynovitis. LIGAMENTS: -Lisfranc ligament: Intact. No subluxation. MUSCLES AND SOFT TISSUES: -There is a tract of abnormal signal extending from the distal plantar skin surface of the great toe proximally and dorsally to near the distal tuft, as seen on image 5 of series 9 and image 17 of series 6 and image 31 of series 5. This may represent the  tract of a penetrating injury. There is no definite foreign body or abscess. There is adjacent edema and enhancement consistent with cellulitis.     IMPRESSION: 1.  There appears to be a tract from a previous penetrating injury in the distal end of the great toe extending down close to the distal tuft of the distal phalanx of the great toe. 2.  There is underlying bone marrow edema like signal and enhancement as well as loss of T1 signal in the distal phalanx of the great toe, strong evidence of osteomyelitis. 3.  There is no evidence of abscess, tenosynovitis, myositis or septic joint.    Foot XR, G/E 3 views, left    Result Date: 8/10/2024  EXAM: XR FOOT LEFT G/E 3 VIEWS LOCATION: [facility] DATE: 8/10/2024 INDICATION: Swelling and redness. Evaluate for retained foreign body in the left big toe. COMPARISON: None.     IMPRESSION: There is no evidence of an acute fracture with attention to the great toe. There is some soft tissue swelling and irregularity however there is no discrete radiopaque foreign body identified. No significant joint space narrowing. No cortical destructive change to suggest osteomyelitis.         Data reviewed today: I reviewed all medications, new labs and imaging results  over the last 24 hours. I personally reviewed no images or EKG's today.  The patient's care was discussed with the Patient.

## 2024-08-12 NOTE — PHARMACY-VANCOMYCIN DOSING SERVICE
"Pharmacy Vancomycin Note  Date of Service 2024  Patient's  1970   54 year old, male    Indication: Osteomyelitis  Day of Therapy: 3  Current vancomycin regimen:  1250 mg IV q12h  Current vancomycin monitoring method: AUC  Current vancomycin therapeutic monitoring goal: 400-600 mg*h/L    InsightRX Prediction of Current Vancomycin Regimen  Regimen: 1250 mg IV every 12 hours.  Start time: 08:00 on 2024  Exposure target: AUC24 (range)400-600 mg/L.hr   AUC24,ss: 349 mg/L.hr  Probability of AUC24 > 400: 21 %  Ctrough,ss: 7.6 mg/L  Probability of Ctrough,ss > 20: 0 %  Probability of nephrotoxicity (Lodise MO ): 4 %    Current estimated CrCl = Estimated Creatinine Clearance: 125.3 mL/min (A) (based on SCr of 0.65 mg/dL (L)).    Creatinine for last 3 days  8/10/2024: 12:45 PM Creatinine 0.62 mg/dL  2024:  6:42 AM Creatinine 0.71 mg/dL  2024:  5:21 AM Creatinine 0.65 mg/dL    Recent Vancomycin Levels (past 3 days)  2024:  5:21 AM Vancomycin 10.2 ug/mL    Vancomycin IV Administrations (past 72 hours)                     vancomycin (VANCOCIN) 1,250 mg in sodium chloride 0.9 % 250 mL intermittent infusion (mg) 1,250 mg New Bag 24 2159     1,250 mg New Bag  0925    vancomycin (VANCOCIN) 1,750 mg in sodium chloride 0.9 % 500 mL intermittent infusion (mg) 1,750 mg Given 08/10/24 2207                    Nephrotoxins and other renal medications (From now, onward)      Start     Dose/Rate Route Frequency Ordered Stop    24 0900  vancomycin (VANCOCIN) 1,750 mg in sodium chloride 0.9 % 500 mL intermittent infusion         1,750 mg  over 2 Hours Intravenous EVERY 12 HOURS 24 0821      08/10/24 1900  piperacillin-tazobactam (ZOSYN) 3.375 g vial to attach to  mL bag        Note to Pharmacy: For [facility], [facility] and [facility]: For Zosyn-naive patients, use the \"Zosyn initial dose + extended infusion\" order panel.    3.375 g  over 240 Minutes Intravenous EVERY 8 HOURS 08/10/24 2061        "          Contrast Orders - past 72 hours (72h ago, onward)      Start     Dose/Rate Route Frequency Stop    08/10/24 1830  gadobutrol (GADAVIST) injection 7 mL         7 mL Intravenous ONCE 08/10/24 1805            Interpretation of levels and current regimen:  Vancomycin level is reflective of AUC less than 400    Has serum creatinine changed greater than 50% in last 72 hours: No    Urine output:  unable to determine    Renal Function: Stable    InsightRX Prediction of Planned New Vancomycin Regimen  Regimen: 1750 mg IV every 12 hours.  Start time: 20:23 on 08/12/2024  Exposure target: AUC24 (range)400-600 mg/L.hr   AUC24,ss: 489 mg/L.hr  Probability of AUC24 > 400: 89 %  Ctrough,ss: 10.9 mg/L  Probability of Ctrough,ss > 20: 1 %  Probability of nephrotoxicity (Lodise MO 2009): 6 %    Plan:  Increase Dose to Vancomycin 1750 mg IV q12h  Vancomycin monitoring method: AUC  Vancomycin therapeutic monitoring goal: 400-600 mg*h/L  Pharmacy will check vancomycin levels as appropriate in  2-4 days .  Serum creatinine levels will be ordered daily for the first week of therapy and at least twice weekly for subsequent weeks.    Miguel Angel Delong [facility]

## 2024-08-13 LAB
BACTERIA TISS BX CULT: ABNORMAL
BACTERIA WND CULT: ABNORMAL
CREAT SERPL-MCNC: 0.71 MG/DL (ref 0.67–1.17)
EGFRCR SERPLBLD CKD-EPI 2021: >90 ML/MIN/1.73M2
GLUCOSE BLDC GLUCOMTR-MCNC: 185 MG/DL (ref 70–99)
GLUCOSE BLDC GLUCOMTR-MCNC: 192 MG/DL (ref 70–99)
GLUCOSE BLDC GLUCOMTR-MCNC: 207 MG/DL (ref 70–99)
GLUCOSE BLDC GLUCOMTR-MCNC: 228 MG/DL (ref 70–99)
GLUCOSE BLDC GLUCOMTR-MCNC: 228 MG/DL (ref 70–99)
GRAM STAIN RESULT: ABNORMAL

## 2024-08-13 PROCEDURE — 250N000011 HC RX IP 250 OP 636: Performed by: HOSPITALIST

## 2024-08-13 PROCEDURE — 258N000003 HC RX IP 258 OP 636: Performed by: HOSPITALIST

## 2024-08-13 PROCEDURE — 36415 COLL VENOUS BLD VENIPUNCTURE: CPT | Performed by: HOSPITALIST

## 2024-08-13 PROCEDURE — 99232 SBSQ HOSP IP/OBS MODERATE 35: CPT | Performed by: INTERNAL MEDICINE

## 2024-08-13 PROCEDURE — 120N000001 HC R&B MED SURG/OB

## 2024-08-13 PROCEDURE — 99232 SBSQ HOSP IP/OBS MODERATE 35: CPT | Performed by: HOSPITALIST

## 2024-08-13 PROCEDURE — 82565 ASSAY OF CREATININE: CPT | Performed by: HOSPITALIST

## 2024-08-13 PROCEDURE — 250N000013 HC RX MED GY IP 250 OP 250 PS 637: Performed by: HOSPITALIST

## 2024-08-13 PROCEDURE — 250N000013 HC RX MED GY IP 250 OP 250 PS 637: Performed by: INTERNAL MEDICINE

## 2024-08-13 RX ORDER — METRONIDAZOLE 500 MG/1
500 TABLET ORAL 3 TIMES DAILY
Status: DISCONTINUED | OUTPATIENT
Start: 2024-08-13 | End: 2024-08-15 | Stop reason: ALTCHOICE

## 2024-08-13 RX ORDER — LEVOFLOXACIN 750 MG/1
750 TABLET, FILM COATED ORAL DAILY
Status: DISCONTINUED | OUTPATIENT
Start: 2024-08-13 | End: 2024-08-15 | Stop reason: ALTCHOICE

## 2024-08-13 RX ORDER — ENOXAPARIN SODIUM 100 MG/ML
40 INJECTION SUBCUTANEOUS
Status: DISCONTINUED | OUTPATIENT
Start: 2024-08-13 | End: 2024-08-15 | Stop reason: HOSPADM

## 2024-08-13 RX ADMIN — METRONIDAZOLE 500 MG: 500 TABLET ORAL at 21:43

## 2024-08-13 RX ADMIN — PIPERACILLIN AND TAZOBACTAM 3.38 G: 3; .375 INJECTION, POWDER, FOR SOLUTION INTRAVENOUS at 10:58

## 2024-08-13 RX ADMIN — SODIUM CHLORIDE 1750 MG: 9 INJECTION, SOLUTION INTRAVENOUS at 08:24

## 2024-08-13 RX ADMIN — METFORMIN HYDROCHLORIDE 500 MG: 500 TABLET, FILM COATED ORAL at 17:19

## 2024-08-13 RX ADMIN — SODIUM CHLORIDE 1750 MG: 9 INJECTION, SOLUTION INTRAVENOUS at 20:53

## 2024-08-13 RX ADMIN — GABAPENTIN 100 MG: 100 CAPSULE ORAL at 20:50

## 2024-08-13 RX ADMIN — INSULIN ASPART 2 UNITS: 100 INJECTION, SOLUTION INTRAVENOUS; SUBCUTANEOUS at 08:20

## 2024-08-13 RX ADMIN — GABAPENTIN 100 MG: 100 CAPSULE ORAL at 14:23

## 2024-08-13 RX ADMIN — ENOXAPARIN SODIUM 40 MG: 40 INJECTION SUBCUTANEOUS at 14:50

## 2024-08-13 RX ADMIN — PIPERACILLIN AND TAZOBACTAM 3.38 G: 3; .375 INJECTION, POWDER, FOR SOLUTION INTRAVENOUS at 03:22

## 2024-08-13 RX ADMIN — GABAPENTIN 100 MG: 100 CAPSULE ORAL at 08:19

## 2024-08-13 RX ADMIN — INSULIN GLARGINE 5 UNITS: 100 INJECTION, SOLUTION SUBCUTANEOUS at 08:20

## 2024-08-13 RX ADMIN — LEVOFLOXACIN 750 MG: 750 TABLET, FILM COATED ORAL at 13:32

## 2024-08-13 RX ADMIN — ATORVASTATIN CALCIUM 10 MG: 10 TABLET, FILM COATED ORAL at 20:50

## 2024-08-13 RX ADMIN — INSULIN ASPART 2 UNITS: 100 INJECTION, SOLUTION INTRAVENOUS; SUBCUTANEOUS at 13:30

## 2024-08-13 RX ADMIN — METFORMIN HYDROCHLORIDE 500 MG: 500 TABLET, FILM COATED ORAL at 08:19

## 2024-08-13 RX ADMIN — INSULIN ASPART 2 UNITS: 100 INJECTION, SOLUTION INTRAVENOUS; SUBCUTANEOUS at 17:20

## 2024-08-13 RX ADMIN — METRONIDAZOLE 500 MG: 500 TABLET ORAL at 14:23

## 2024-08-13 ASSESSMENT — ACTIVITIES OF DAILY LIVING (ADL)
ADLS_ACUITY_SCORE: 23
ADLS_ACUITY_SCORE: 22

## 2024-08-13 NOTE — PLAN OF CARE
"  Problem: Adult Inpatient Plan of Care  Goal: Plan of Care Review  Description: The Plan of Care Review/Shift note should be completed every shift.  The Outcome Evaluation is a brief statement about your assessment that the patient is improving, declining, or no change.  This information will be displayed automatically on your shift  note.  Outcome: Progressing  Goal: Patient-Specific Goal (Individualized)  Description: You can add care plan individualizations to a care plan. Examples of Individualization might be:  \"Parent requests to be called daily at 9am for status\", \"I have a hard time hearing out of my right ear\", or \"Do not touch me to wake me up as it startles  me\".  Outcome: Progressing  Goal: Absence of Hospital-Acquired Illness or Injury  Outcome: Progressing  Intervention: Prevent Skin Injury  Recent Flowsheet Documentation  Taken 8/13/2024 0800 by Mckenzie Knight RN  Body Position: position changed independently  Goal: Optimal Comfort and Wellbeing  Outcome: Progressing  Goal: Readiness for Transition of Care  Outcome: Progressing   Goal Outcome Evaluation:       Wound vac in place and intact.  No drainage noted in canister.  Patient denies pain.  NWB on left foot.      Temp: 98.2  F (36.8  C) Temp src: Oral BP: 125/79 Pulse: 69   Resp: 18 SpO2: 96 % O2 Device: None (Room air)                     "

## 2024-08-13 NOTE — PLAN OF CARE
"  Problem: Adult Inpatient Plan of Care  Goal: Plan of Care Review  Description: The Plan of Care Review/Shift note should be completed every shift.  The Outcome Evaluation is a brief statement about your assessment that the patient is improving, declining, or no change.  This information will be displayed automatically on your shift  note.  Outcome: Progressing  Goal: Patient-Specific Goal (Individualized)  Description: You can add care plan individualizations to a care plan. Examples of Individualization might be:  \"Parent requests to be called daily at 9am for status\", \"I have a hard time hearing out of my right ear\", or \"Do not touch me to wake me up as it startles  me\".  Outcome: Progressing     Problem: Skin or Soft Tissue Infection  Goal: Absence of Infection Signs and Symptoms  Outcome: Progressing  Intervention: Minimize and Manage Infection Progression  Recent Flowsheet Documentation  Taken 8/12/2024 1547 by Juliana Villegas, RN  Infection Prevention: hand hygiene promoted     Problem: Comorbidity Management  Goal: Blood Glucose Levels Within Targeted Range  Outcome: Progressing     Problem: Infection  Goal: Absence of Infection Signs and Symptoms  Outcome: Progressing     Problem: Pain Acute  Goal: Optimal Pain Control and Function  Outcome: Progressing     Problem: Adult Inpatient Plan of Care  Goal: Absence of Hospital-Acquired Illness or Injury  Intervention: Prevent Infection  Recent Flowsheet Documentation  Taken 8/12/2024 1547 by Juliana Villegas, RN  Infection Prevention: hand hygiene promoted   Goal Outcome Evaluation:                    Pt. Is A&O. Hardly notices pain this shift. RA. IV abx running. NWB on LLE . Wound vac in place with 0 output.  and 284. Dressing CDI. Continue with POC.     "

## 2024-08-13 NOTE — PROGRESS NOTES
"Infectious Diseases Progress Note  [facility]    Date of visit: 08/13/2024     ASSESSMENT   54-year-old male with a history of poorly controlled diabetes who is admitted with left foot infection.    Left diabetic foot infection.  Patient identified foreign body.  Subsequent swelling, erythema, fevers.  MRI showing osteomyelitis at the distal phalanx.  S/p debridement 8/11 - patient declining amputation.  Wound culture on 8/10 is polymicrobial. Intra-op culture with Staph lugdunensis, K.pneumoniae and C.freundii. Citrobacter resistant to pipercillin/tazobactam. surrounding cellulitis improving   Poorly controlled diabetes.  A1c 13.3  ABIs normal bilaterally     Active Problems:    Cellulitis of left foot    Toe infection       PLAN   -Continue vancomycin   -stop pipercillin/tazobactam  -start levofloxacin and flagyl  -Follow-up on intraoperative cultures      Magen Ramirez MD  [facility]  Direct messaging: [employer] Paging  On-Call ID provider: [phone], option: 9      ===========================================    SUBJECTIVE / INTERVAL HISTORY:     No events. No pain.      Antibiotics   Vancomycin 8/10-  Zosyn 8/10-    Previous:  none       Physical Exam     Temp:  [98.2  F (36.8  C)-98.3  F (36.8  C)] 98.2  F (36.8  C)  Pulse:  [66-81] 69  Resp:  [18] 18  BP: (121-125)/(74-79) 125/79  SpO2:  [95 %-96 %] 96 %    /79 (BP Location: Left arm)   Pulse 69   Temp 98.2  F (36.8  C) (Oral)   Resp 18   Ht 1.651 m (5' 5\")   Wt 76.2 kg (168 lb)   SpO2 96%   BMI 27.96 kg/m      GENERAL:  well-developed, well-nourished, lying in bed in no acute distress.   HENT:  Head is normocephalic, atraumatic.   EYES:  Eyes have anicteric sclerae without conjunctival injection   LUNGS:  normal resp pattern  EXT: Extremities warm and without edema.  left first toe with erythema improved. Wound vac in place  SKIN:  No acute rashes.    NEUROLOGIC:  Grossly nonfocal.      Cultures   8/10 " blood culture: No growth to date  8/10 left toe culture: Klebsiella pneumoniae, Citrobacter freundii, group G strep, Enterococcus faecalis  8/11 left toe: K.pna and C.freundii, S.lugdunensis    Susceptibility data from last 90 days.  Collected Specimen Info Organism Ampicillin Ampicillin/Sulbactam Cefepime Ceftazidime Ceftriaxone Ciprofloxacin Gentamicin Gentamicin Synergy Levofloxacin Meropenem Piperacillin/Tazobactam Tobramycin   08/11/24 Tissue from Toe, Left Citrobacter freundii complex                 Klebsiella pneumoniae                 Staphylococcus lugdunensis               08/10/24 Wound from Toe, Left Klebsiella pneumoniae R  S  S  S  S  S  S   S  S  S  S     Citrobacter freundii complex R R  S R R  S  S   S  S R  S     Enterococcus faecalis  S        S         Staphylococcus lugdunensis                 Streptococcus canis (Group G Streptococcus)                 Collected Specimen Info Organism Trimethoprim/Sulfamethoxazole  Vancomycin   08/11/24 Tissue from Toe, Left Citrobacter freundii complex       Klebsiella pneumoniae       Staphylococcus lugdunensis     08/10/24 Wound from Toe, Left Klebsiella pneumoniae  S      Citrobacter freundii complex  S      Enterococcus faecalis   S     Staphylococcus lugdunensis       Streptococcus canis (Group G Streptococcus)          Pertinent Labs:     Recent Labs   Lab 08/12/24  0521 08/10/24  1245   WBC 6.6 4.9   HGB 12.3* 13.5    259       Recent Labs   Lab 08/12/24  0521 08/11/24  0642 08/10/24  1245    136 136   CO2 26 25 28   BUN 15.3 12.8 13.8   ALBUMIN 3.3* 3.4*  --        Recent Labs   Lab 08/10/24  1245   CRPI [id number]*           Imaging:     US Lower Extremity Arterial Duplex Bilateral    Result Date: 8/12/2024  [facility] 1. EXAM: RESTING ANKLE-BRACHIAL INDICES (ABIs) 2. DUPLEX ARTERIAL ULTRASOUND OF THE LOWER EXTREMITIES BILATERALLY 8/12/2024 2:23 PM CDT INDICATION: Diabetic foot infection. TECHNIQUE: Duplex imaging is  performed utilizing gray-scale, two-dimensional images and color-flow imaging. Doppler waveform analysis and spectral Doppler imaging is also performed. COMPARISON: None FINDINGS: SEGMENTAL BP RIGHT Brachial: -- Ankle (PT): 167; Index: 1.27 Ankle (DP): 160; Index: 1.22 Digit: 125; Index: 0.95 LEFT Brachial: 131 Ankle (PT): 156; Index: 1.19 Ankle (DP): 148; Index: 1.13 Digit: 92; Index: 0.70 Resting ABIs are 1.3 on the right. Resting ABIs are  1.2 on the left. WAVEFORMS: Triphasic waveforms bilaterally. Normal waveforms and toe pressures in the bilateral toes. DUPLEX ARTERIAL ULTRASOUND FINDINGS:  (Peak Systole/End Diastole) RIGHT (cm/s) EIA: 147/23 CFA: 95/17 PFA: 68/-- SFA-Proximal: 99/17 SFA-Mid: 91/15 SFA-Distal: 71/14 Popliteal-Proximal: 65/14 Popliteal-Distal: 98/21 PTA: 84/22 KIRAN: 80/24 DPA: 40/13 LEFT (cm/s) EIA: 146/22 CFA: 95/11 PFA: 87/10 SFA-Proximal: 98/13 SFA-Mid: 110/15 SFA-Distal: 103/19 Popliteal-Proximal: 104/21 Popliteal-Distal: 117/16 PTA: 126/36 KIRAN: 130/39 DPA: 41/10 RIGHT: Right external iliac artery, common femoral artery, profunda femoral artery, SFA and popliteal arteries are widely patent with normal velocities and multiphasic waveforms. The right PTA, KIRAN and DPA are patent with multiphasic waveforms. LEFT: The left external iliac artery, common femoral artery, profunda femoral artery, SFA and popliteal arteries are widely patent with normal velocities and multiphasic waveforms. Left PTA and KIRAN/DPA are patent with normal velocities and multiphasic waveforms.     IMPRESSION: 1.  RIGHT LOWER EXTREMITY: Right leg resting CHELSEY of 1.3 is within normal limits. No significant stenosis within the right femoral-popliteal segments. The right PTA, KIRAN and DPA are patent with multiphasic waveforms. 2.  LEFT LOWER EXTREMITY: Left leg resting CHELSEY of 1.2 is within normal limits.  No significant stenosis within the left femoral-popliteal segments. The left PTA, KIRAN and DPA are patent with multiphasic  waveforms.    US CHELSEY with PPG wo Exercise    Result Date: 8/12/2024  [facility] 1. EXAM: RESTING ANKLE-BRACHIAL INDICES (ABIs) 2. DUPLEX ARTERIAL ULTRASOUND OF THE LOWER EXTREMITIES BILATERALLY 8/12/2024 2:23 PM CDT INDICATION: Diabetic foot infection. TECHNIQUE: Duplex imaging is performed utilizing gray-scale, two-dimensional images and color-flow imaging. Doppler waveform analysis and spectral Doppler imaging is also performed. COMPARISON: None FINDINGS: SEGMENTAL BP RIGHT Brachial: -- Ankle (PT): 167; Index: 1.27 Ankle (DP): 160; Index: 1.22 Digit: 125; Index: 0.95 LEFT Brachial: 131 Ankle (PT): 156; Index: 1.19 Ankle (DP): 148; Index: 1.13 Digit: 92; Index: 0.70 Resting ABIs are 1.3 on the right. Resting ABIs are  1.2 on the left. WAVEFORMS: Triphasic waveforms bilaterally. Normal waveforms and toe pressures in the bilateral toes. DUPLEX ARTERIAL ULTRASOUND FINDINGS:  (Peak Systole/End Diastole) RIGHT (cm/s) EIA: 147/23 CFA: 95/17 PFA: 68/-- SFA-Proximal: 99/17 SFA-Mid: 91/15 SFA-Distal: 71/14 Popliteal-Proximal: 65/14 Popliteal-Distal: 98/21 PTA: 84/22 KIRAN: 80/24 DPA: 40/13 LEFT (cm/s) EIA: 146/22 CFA: 95/11 PFA: 87/10 SFA-Proximal: 98/13 SFA-Mid: 110/15 SFA-Distal: 103/19 Popliteal-Proximal: 104/21 Popliteal-Distal: 117/16 PTA: 126/36 KIRAN: 130/39 DPA: 41/10 RIGHT: Right external iliac artery, common femoral artery, profunda femoral artery, SFA and popliteal arteries are widely patent with normal velocities and multiphasic waveforms. The right PTA, KIRAN and DPA are patent with multiphasic waveforms. LEFT: The left external iliac artery, common femoral artery, profunda femoral artery, SFA and popliteal arteries are widely patent with normal velocities and multiphasic waveforms. Left PTA and KIRAN/DPA are patent with normal velocities and multiphasic waveforms.     IMPRESSION: 1.  RIGHT LOWER EXTREMITY: Right leg resting CHELSEY of 1.3 is within normal limits. No significant stenosis within the  "right femoral-popliteal segments. The right PTA, KIRAN and DPA are patent with multiphasic waveforms. 2.  LEFT LOWER EXTREMITY: Left leg resting CHELSEY of 1.2 is within normal limits.  No significant stenosis within the left femoral-popliteal segments. The left PTA, KIRAN and DPA are patent with multiphasic waveforms.    POC US Guidance Needle Placement    Result Date: 8/11/2024  Ultrasound was performed as guidance to an anesthesia procedure.  Click \"PACS images\" hyperlink below to view any stored images.  For specific procedure details, view procedure note authored by anesthesia.    MR Foot Left w/o & w Contrast    Result Date: 8/10/2024  EXAM: MR FOOT LEFT W/O and W CONTRAST LOCATION: [facility] DATE: 8/10/2024 INDICATION: Diabetic foot infection, had preceding penetrating injury. COMPARISON: Radiographs from 8/10/2024. TECHNIQUE: Routine. Additional postgadolinium T1 sequences were obtained. IV CONTRAST: 7ml Gadavist FINDINGS: JOINTS AND BONES: -There is bone marrow edema like signal and enhancement and loss of T1 signal in the distal phalanx of the great toe. No effusion or synovitis. The bones are normal otherwise. The joints appear normal. TENDONS: -No tendon tear, tendinopathy, or tenosynovitis. LIGAMENTS: -Lisfranc ligament: Intact. No subluxation. MUSCLES AND SOFT TISSUES: -There is a tract of abnormal signal extending from the distal plantar skin surface of the great toe proximally and dorsally to near the distal tuft, as seen on image 5 of series 9 and image 17 of series 6 and image 31 of series 5. This may represent the  tract of a penetrating injury. There is no definite foreign body or abscess. There is adjacent edema and enhancement consistent with cellulitis.     IMPRESSION: 1.  There appears to be a tract from a previous penetrating injury in the distal end of the great toe extending down close to the distal tuft of the distal phalanx of the great toe. 2.  There is underlying bone " marrow edema like signal and enhancement as well as loss of T1 signal in the distal phalanx of the great toe, strong evidence of osteomyelitis. 3.  There is no evidence of abscess, tenosynovitis, myositis or septic joint.    Foot XR, G/E 3 views, left    Result Date: 8/10/2024  EXAM: XR FOOT LEFT G/E 3 VIEWS LOCATION: [facility] DATE: 8/10/2024 INDICATION: Swelling and redness. Evaluate for retained foreign body in the left big toe. COMPARISON: None.     IMPRESSION: There is no evidence of an acute fracture with attention to the great toe. There is some soft tissue swelling and irregularity however there is no discrete radiopaque foreign body identified. No significant joint space narrowing. No cortical destructive change to suggest osteomyelitis.         Data reviewed today: I reviewed all medications, new labs and imaging results over the last 24 hours. I personally reviewed no images or EKG's today.  The patient's care was discussed with the Patient.

## 2024-08-13 NOTE — PROGRESS NOTES
"Care Management Follow Up    Length of Stay (days): 3    Expected Discharge Date: 08/15/2024     Concerns to be Addressed: Care progression - discharge planning     Patient plan of care discussed at interdisciplinary rounds: Yes    Anticipated Discharge Disposition:  TBD     Anticipated Discharge Services:  TBD  Anticipated Discharge DME:  NA    Patient/family educated on Medicare website which has current facility and service quality ratings:  NA  Education Provided on the Discharge Plan:  Yes per team  Patient/Family in Agreement with the Plan:  NA    Referrals Placed by CM/SW:  NA  Private pay costs discussed: Not applicable    Additional Information:  Per provider, Dr. Hedrick, unknown discharge plan. Waiting for pathology results.  Patient has no medical insurance.    Sent message to [employer] Worker regarding the medical insurance.    Social Hx: \"Live w/spouse/adult dtr in a house. Independent w/all I/ADLs. Work as a . Use no DME, has no services. Possible amputation, IV abx? Ref to FW. Family to transport.\"    RNCM to follow for medical progression, recommendations, and final discharge plan.     Bhavna Hill RN     "

## 2024-08-13 NOTE — PROGRESS NOTES
[facility]    Medicine Progress Note - Hospitalist Service    Date of Admission:  8/10/2024    Assessment & Plan   Petar Fermin is a 54 year old male admitted on 8/10/2024. He is being admitted for left foot cellulitis.       #Left foot cellulitis, diabetic foot infection  #Left great toe osteomyelitis  -Duration of symptoms unclear as patient does not recall initial injury, infectious symptoms seem to have acutely worsened 3 days prior to admission when patient manipulated his wound  -Mild fevers but otherwise nontoxic on admission  -Left foot MRI 8/10 showing osteomyelitis changes left great toe  -Podiatry consult, plan for limited debridement 8/11  -Wound vac placed 8/12 per podiatry  -ID consult 8/11  -Wound culture polymicrobial, surgical cultures pending  -Continues on vancomycin, Zosyn switched to levofloxacin Flagyl 8/13 for Citrobacter result  -Wound care consult  -Follow-up cultures     #Type 2 diabetes mellitus complicated by hyperglycemia and peripheral neuropathy  -A1c over 13%, blood sugar in the 300s on admission likely from infectious stress  -Routinely takes metformin, held initially, resuming 8/13  -Moderate correctional scale ordered, titrate as needed  -Carb ratio added 8/13  -5 units Lantus started 8/11     #Hyperlipidemia  -Resume usual atorvastatin          Diet: Advance Diet as Tolerated: Regular Diet Adult; Moderate Consistent Carb (60 g CHO per Meal) Diet    DVT Prophylaxis: Enoxaparin (Lovenox) SQ  Martinez Catheter: Not present  Lines: None     Cardiac Monitoring: None  Code Status: Full Code      Clinically Significant Risk Factors          # Hypocalcemia: Lowest Ca = 8.6 mg/dL in last 2 days, will monitor and replace as appropriate     # Hypoalbuminemia: Lowest albumin = 3.3 g/dL at 8/12/2024  5:21 AM, will monitor as appropriate              # DMII: A1C = 13.3 % (Ref range: <5.7 %) within past 6 months, PRESENT ON ADMISSION  # Overweight: Estimated body mass  "index is 27.96 kg/m  as calculated from the following:    Height as of this encounter: 1.651 m (5' 5\").    Weight as of this encounter: 76.2 kg (168 lb)., PRESENT ON ADMISSION                  Disposition Plan     Medically Ready for Discharge: Anticipated in 2-4 Days             Neel Hedrick MD  Hospitalist Service  [facility]  Securely message with [employer] (more info)  Text page via [employer] Paging/Directory   ______________________________________________________________________    Interval History   Feels fine today, no new complaints.    Physical Exam   Vital Signs: Temp: 98.2  F (36.8  C) Temp src: Oral BP: 125/79 Pulse: 69   Resp: 18 SpO2: 96 % O2 Device: None (Room air)    Weight: 168 lbs 0 oz    Well-appearing, wound VAC now appearance left foot, minimal redness, bandaging benign, normal affect, trace edema    Medical Decision Making       43 MINUTES SPENT BY ME on the date of service doing chart review, history, exam, documentation & further activities per the note.  NOTE(S)/MEDICAL RECORDS REVIEWED over the past 24 hours: Vitals, labs, new imaging, documentation and medication administrations       Data     I have personally reviewed the following data over the past 24 hrs:    N/A  \   N/A   / N/A     N/A N/A N/A /  207 (H)   N/A N/A 0.71 \       Imaging results reviewed over the past 24 hrs:   No results found for this or any previous visit (from the past 24 hour(s)).  "

## 2024-08-14 PROBLEM — L08.9 TOE INFECTION: Status: RESOLVED | Noted: 2024-08-10 | Resolved: 2024-08-14

## 2024-08-14 PROBLEM — L03.116 CELLULITIS OF LEFT FOOT: Status: RESOLVED | Noted: 2024-08-10 | Resolved: 2024-08-14

## 2024-08-14 PROBLEM — M86.9 TOE OSTEOMYELITIS (H): Status: ACTIVE | Noted: 2024-08-14

## 2024-08-14 LAB
CREAT SERPL-MCNC: 0.67 MG/DL (ref 0.67–1.17)
EGFRCR SERPLBLD CKD-EPI 2021: >90 ML/MIN/1.73M2
GLUCOSE BLDC GLUCOMTR-MCNC: 135 MG/DL (ref 70–99)
GLUCOSE BLDC GLUCOMTR-MCNC: 154 MG/DL (ref 70–99)
GLUCOSE BLDC GLUCOMTR-MCNC: 185 MG/DL (ref 70–99)
GLUCOSE BLDC GLUCOMTR-MCNC: 201 MG/DL (ref 70–99)
GLUCOSE BLDC GLUCOMTR-MCNC: 302 MG/DL (ref 70–99)
VANCOMYCIN SERPL-MCNC: 14.4 UG/ML

## 2024-08-14 PROCEDURE — 82565 ASSAY OF CREATININE: CPT | Performed by: HOSPITALIST

## 2024-08-14 PROCEDURE — 36415 COLL VENOUS BLD VENIPUNCTURE: CPT | Performed by: HOSPITALIST

## 2024-08-14 PROCEDURE — 80202 ASSAY OF VANCOMYCIN: CPT | Performed by: HOSPITALIST

## 2024-08-14 PROCEDURE — 258N000003 HC RX IP 258 OP 636: Performed by: HOSPITALIST

## 2024-08-14 PROCEDURE — 120N000001 HC R&B MED SURG/OB

## 2024-08-14 PROCEDURE — 99233 SBSQ HOSP IP/OBS HIGH 50: CPT | Performed by: HOSPITALIST

## 2024-08-14 PROCEDURE — 99232 SBSQ HOSP IP/OBS MODERATE 35: CPT | Performed by: INTERNAL MEDICINE

## 2024-08-14 PROCEDURE — 999N000197 HC STATISTIC WOC PT EDUCATION, 0-15 MIN

## 2024-08-14 PROCEDURE — 250N000013 HC RX MED GY IP 250 OP 250 PS 637: Performed by: INTERNAL MEDICINE

## 2024-08-14 PROCEDURE — 250N000011 HC RX IP 250 OP 636: Performed by: HOSPITALIST

## 2024-08-14 PROCEDURE — 250N000013 HC RX MED GY IP 250 OP 250 PS 637: Performed by: HOSPITALIST

## 2024-08-14 RX ORDER — SULFAMETHOXAZOLE/TRIMETHOPRIM 800-160 MG
1 TABLET ORAL 3 TIMES DAILY
Status: DISCONTINUED | OUTPATIENT
Start: 2024-08-14 | End: 2024-08-15 | Stop reason: HOSPADM

## 2024-08-14 RX ORDER — GLIPIZIDE 10 MG/1
10 TABLET, FILM COATED, EXTENDED RELEASE ORAL DAILY
Qty: 30 TABLET | Refills: 2 | Status: SHIPPED | OUTPATIENT
Start: 2024-08-14

## 2024-08-14 RX ORDER — SULFAMETHOXAZOLE/TRIMETHOPRIM 800-160 MG
1 TABLET ORAL 3 TIMES DAILY
Qty: 84 TABLET | Refills: 0 | Status: SHIPPED | OUTPATIENT
Start: 2024-08-14 | End: 2024-08-14

## 2024-08-14 RX ORDER — SULFAMETHOXAZOLE/TRIMETHOPRIM 800-160 MG
1 TABLET ORAL 3 TIMES DAILY
Qty: 84 TABLET | Refills: 0 | Status: SHIPPED | OUTPATIENT
Start: 2024-08-14 | End: 2024-09-12

## 2024-08-14 RX ADMIN — METRONIDAZOLE 500 MG: 500 TABLET ORAL at 21:01

## 2024-08-14 RX ADMIN — AMOXICILLIN AND CLAVULANATE POTASSIUM 1 TABLET: 875; 125 TABLET, FILM COATED ORAL at 21:01

## 2024-08-14 RX ADMIN — GABAPENTIN 100 MG: 100 CAPSULE ORAL at 21:01

## 2024-08-14 RX ADMIN — LEVOFLOXACIN 750 MG: 750 TABLET, FILM COATED ORAL at 06:17

## 2024-08-14 RX ADMIN — METRONIDAZOLE 500 MG: 500 TABLET ORAL at 13:12

## 2024-08-14 RX ADMIN — SODIUM CHLORIDE 1750 MG: 9 INJECTION, SOLUTION INTRAVENOUS at 09:55

## 2024-08-14 RX ADMIN — SULFAMETHOXAZOLE AND TRIMETHOPRIM 1 TABLET: 800; 160 TABLET ORAL at 21:01

## 2024-08-14 RX ADMIN — GABAPENTIN 100 MG: 100 CAPSULE ORAL at 08:34

## 2024-08-14 RX ADMIN — INSULIN ASPART 1 UNITS: 100 INJECTION, SOLUTION INTRAVENOUS; SUBCUTANEOUS at 08:34

## 2024-08-14 RX ADMIN — ATORVASTATIN CALCIUM 10 MG: 10 TABLET, FILM COATED ORAL at 21:01

## 2024-08-14 RX ADMIN — INSULIN ASPART 2 UNITS: 100 INJECTION, SOLUTION INTRAVENOUS; SUBCUTANEOUS at 13:11

## 2024-08-14 RX ADMIN — METFORMIN HYDROCHLORIDE 500 MG: 500 TABLET, FILM COATED ORAL at 17:38

## 2024-08-14 RX ADMIN — METFORMIN HYDROCHLORIDE 500 MG: 500 TABLET, FILM COATED ORAL at 08:34

## 2024-08-14 RX ADMIN — GABAPENTIN 100 MG: 100 CAPSULE ORAL at 13:12

## 2024-08-14 RX ADMIN — METRONIDAZOLE 500 MG: 500 TABLET ORAL at 06:17

## 2024-08-14 RX ADMIN — AMOXICILLIN AND CLAVULANATE POTASSIUM 1 TABLET: 875; 125 TABLET, FILM COATED ORAL at 13:16

## 2024-08-14 RX ADMIN — INSULIN GLARGINE 5 UNITS: 100 INJECTION, SOLUTION SUBCUTANEOUS at 08:34

## 2024-08-14 RX ADMIN — SULFAMETHOXAZOLE AND TRIMETHOPRIM 1 TABLET: 800; 160 TABLET ORAL at 13:12

## 2024-08-14 ASSESSMENT — ACTIVITIES OF DAILY LIVING (ADL)
ADLS_ACUITY_SCORE: 22
ADLS_ACUITY_SCORE: 22
ADLS_ACUITY_SCORE: 23
ADLS_ACUITY_SCORE: 23
ADLS_ACUITY_SCORE: 22
ADLS_ACUITY_SCORE: 21
ADLS_ACUITY_SCORE: 21
ADLS_ACUITY_SCORE: 23
ADLS_ACUITY_SCORE: 23
ADLS_ACUITY_SCORE: 22
ADLS_ACUITY_SCORE: 22
ADLS_ACUITY_SCORE: 23
ADLS_ACUITY_SCORE: 22
ADLS_ACUITY_SCORE: 22
ADLS_ACUITY_SCORE: 23
ADLS_ACUITY_SCORE: 21

## 2024-08-14 NOTE — PHARMACY-VANCOMYCIN DOSING SERVICE
Pharmacy Vancomycin Note  Date of Service 2024  Patient's  1970   54 year old, male    Indication: Osteomyelitis  Day of Therapy: 5  Current vancomycin regimen:  1750 mg IV q12h  Current vancomycin monitoring method: AUC  Current vancomycin therapeutic monitoring goal: 400-600 mg*h/L    [employer] Prediction of Current Vancomycin Regimen  Regimen: 1750 mg IV every 12 hours.  Start time: 08:53 on 2024  Exposure target: AUC24 (range)400-600 mg/L.hr   AUC24,ss: 516 mg/L.hr  Probability of AUC24 > 400: 95 %  Ctrough,ss: 12.3 mg/L  Probability of Ctrough,ss > 20: 0 %  Probability of nephrotoxicity (Lodise MO ): 8 %    Current estimated CrCl = Estimated Creatinine Clearance: 121.6 mL/min (based on SCr of 0.67 mg/dL).    Creatinine for last 3 days  2024:  5:21 AM Creatinine 0.65 mg/dL  2024:  5:25 AM Creatinine 0.71 mg/dL  2024:  7:31 AM Creatinine 0.67 mg/dL    Recent Vancomycin Levels (past 3 days)  2024:  5:21 AM Vancomycin 10.2 ug/mL  2024:  7:31 AM Vancomycin 14.4 ug/mL    Vancomycin IV Administrations (past 72 hours)                     vancomycin (VANCOCIN) 1,750 mg in sodium chloride 0.9 % 500 mL intermittent infusion (mg) 1,750 mg Given 243     1,750 mg Given  824     1,750 mg Given 24 2136     1,750 mg Given  0930    vancomycin (VANCOCIN) 1,250 mg in sodium chloride 0.9 % 250 mL intermittent infusion (mg) 1,250 mg New Bag 24 2159     1,250 mg New Bag  0925                    Nephrotoxins and other renal medications (From now, onward)      Start     Dose/Rate Route Frequency Ordered Stop    24 0900  vancomycin (VANCOCIN) 1,750 mg in sodium chloride 0.9 % 500 mL intermittent infusion         1,750 mg  over 2 Hours Intravenous EVERY 12 HOURS 24 0821                 Contrast Orders - past 72 hours (72h ago, onward)      None            Interpretation of levels and current regimen:  Vancomycin level is reflective of AUC  400-600    Has serum creatinine changed greater than 50% in last 72 hours: No    Urine output:  unable to determine    Renal Function: Stable    Ins[employer] Prediction of Planned New Vancomycin Regimen  Regimen: 1750 mg IV every 12 hours.  Start time: 08:53 on 08/14/2024  Exposure target: AUC24 (range)400-600 mg/L.hr   AUC24,ss: 516 mg/L.hr  Probability of AUC24 > 400: 95 %  Ctrough,ss: 12.3 mg/L  Probability of Ctrough,ss > 20: 0 %  Probability of nephrotoxicity (Lodise MO 2009): 8 %    Plan:  Continue Current Dose  Vancomycin monitoring method: AUC  Vancomycin therapeutic monitoring goal: 400-600 mg*h/L  Pharmacy will check vancomycin levels as appropriate in 3-5 Days.  Serum creatinine levels will be ordered a minimum of twice weekly.    Miguel Angel Delong RPH

## 2024-08-14 NOTE — PLAN OF CARE
Goal Outcome Evaluation:    Pt is A&Ox4, up independently in room using walker, non-weight bearing to LLE. Reprots chronic numbness/tingling in anthony LE, L toe wound vac patent with no drainage. Denied pain. VSS, afebrile. Eating/drinking well. Received IV abx, transitioned to oral abx for discharge   Problem: Adult Inpatient Plan of Care  Goal: Optimal Comfort and Wellbeing  Outcome: Progressing     Problem: Skin or Soft Tissue Infection  Goal: Absence of Infection Signs and Symptoms  Outcome: Progressing  Intervention: Minimize and Manage Infection Progression  Recent Flowsheet Documentation  Taken 8/14/2024 0825 by Nini Richmond RN  Infection Prevention:   equipment surfaces disinfected   hand hygiene promoted   personal protective equipment utilized   rest/sleep promoted   single patient room provided     Problem: Comorbidity Management  Goal: Blood Glucose Levels Within Targeted Range  Outcome: Progressing     Problem: Infection  Goal: Absence of Infection Signs and Symptoms  Outcome: Progressing     Problem: Pain Acute  Goal: Optimal Pain Control and Function  Outcome: Progressing     Problem: Adult Inpatient Plan of Care  Goal: Absence of Hospital-Acquired Illness or Injury  Intervention: Identify and Manage Fall Risk  Recent Flowsheet Documentation  Taken 8/14/2024 0825 by Nini Richmond RN  Safety Promotion/Fall Prevention:   activity supervised   assistive device/personal items within reach   nonskid shoes/slippers when out of bed   mobility aid in reach   patient and family education  Intervention: Prevent Skin Injury  Recent Flowsheet Documentation  Taken 8/14/2024 0825 by Nini Richmond RN  Body Position: position changed independently  Intervention: Prevent Infection  Recent Flowsheet Documentation  Taken 8/14/2024 0825 by Nini Richmond RN  Infection Prevention:   equipment surfaces disinfected   hand hygiene promoted   personal protective equipment utilized   rest/sleep promoted   single  patient room provided

## 2024-08-14 NOTE — PROGRESS NOTES
[facility]    Medicine Progress Note - Hospitalist Service    Date of Admission:  8/10/2024    Assessment & Plan   Petar Fermin is a 54 year old male admitted on 8/10/2024. He is being admitted for left foot cellulitis.       #Left foot cellulitis, diabetic foot infection  #Left great toe osteomyelitis  -Duration of symptoms unclear as patient does not recall initial injury, infectious symptoms seem to have acutely worsened 3 days prior to admission when patient manipulated his wound  -Mild fevers but otherwise nontoxic on admission  -Left foot MRI 8/10 showing osteomyelitis changes left great toe  -Podiatry consult, plan for limited debridement 8/11  -Wound vac placed 8/12 per podiatry  -ID consult 8/11  -Wound culture polymicrobial, surgical cultures similar  -Continues on vancomycin, Zosyn switched to levofloxacin Flagyl 8/13 for Citrobacter result  -Per ID, transition to orals 8/14 with Bactrim DS 3 times daily and Augmentin twice daily for 4 weeks  -Now awaiting arrangements for home wound VAC, patient lacks insurance/resources for this so applying for financial support  -Wound care consult  -Follow-up cultures     #Type 2 diabetes mellitus complicated by hyperglycemia and peripheral neuropathy  -A1c over 13%, blood sugar in the 300s on admission likely from infectious stress  -Routinely takes metformin, held initially, resuming 8/13  -Moderate correctional scale ordered, titrate as needed  -Carb ratio added 8/13  -5 units Lantus started 8/11     #Hyperlipidemia  -Resume usual atorvastatin          Diet: Advance Diet as Tolerated: Regular Diet Adult; Moderate Consistent Carb (60 g CHO per Meal) Diet  Diet    DVT Prophylaxis: Enoxaparin (Lovenox) SQ  Martinez Catheter: Not present  Lines: None     Cardiac Monitoring: None  Code Status: Full Code      Clinically Significant Risk Factors              # Hypoalbuminemia: Lowest albumin = 3.3 g/dL at 8/12/2024  5:21 AM, will monitor as  "appropriate              # DMII: A1C = 13.3 % (Ref range: <5.7 %) within past 6 months   # Overweight: Estimated body mass index is 27.96 kg/m  as calculated from the following:    Height as of this encounter: 1.651 m (5' 5\").    Weight as of this encounter: 76.2 kg (168 lb).                   Disposition Plan     Medically Ready for Discharge: Anticipated Tomorrow             Neel Hedrick MD  Hospitalist Service  [facility]  Securely message with [employer] (more info)  Text page via [employer] Paging/Directory   ______________________________________________________________________    Interval History   Feels fine, no new complaints.    Physical Exam   Vital Signs: Temp: 98.2  F (36.8  C) Temp src: Oral BP: 131/75 Pulse: 70   Resp: 20 SpO2: 98 % O2 Device: None (Room air)    Weight: 168 lbs 0 oz    Nontoxic, no leg swelling, no new redness.  Wound VAC benign.    Medical Decision Making       51 MINUTES SPENT BY ME on the date of service doing chart review, history, exam, documentation & further activities per the note.  NOTE(S)/MEDICAL RECORDS REVIEWED over the past 24 hours: Chart reviewed, discussed with care manager       Data     I have personally reviewed the following data over the past 24 hrs:    N/A  \   N/A   / N/A     N/A N/A N/A /  201 (H)   N/A N/A 0.67 \       Imaging results reviewed over the past 24 hrs:   No results found for this or any previous visit (from the past 24 hour(s)).  "

## 2024-08-14 NOTE — PLAN OF CARE
Problem: Comorbidity Management  Goal: Blood Glucose Levels Within Targeted Range  Outcome: Progressing     Problem: Infection  Goal: Absence of Infection Signs and Symptoms  Outcome: Progressing     Problem: Pain Acute  Goal: Optimal Pain Control and Function  Intervention: Optimize Psychosocial Wellbeing  Recent Flowsheet Documentation  Taken 8/13/2024 1700 by Yaya Yousif RN  Supportive Measures: active listening utilized   Goal Outcome Evaluation:                  Patient wound vac in place, running at 12. No drainage in container. Patient verbalized no pain in lower extremities, chronic numbness. Blood sugars 228 at dinner and 192 at HS. Insulin given for dinner, no insulin given at HS. Patient now on only one IV ABX, vanco, and one PO ABX.

## 2024-08-14 NOTE — PROGRESS NOTES
[facility] Nurse Inpatient Assessment     8.14.24 patient does not have insurance per nursing report. Recommended RN CM contact [employer] to ask regarding compassionate care use for home VAC for patient. If this is approved, then patient will need to be set up in clinic for twice weekly dressing changes for VAC therapy. If compassionate care use for home pump is denied then DPM needs to be notified regarding this development. DPM can order alternate plan of care or WO can make recommendations as well if DPM prefers. Discharge orders updated for VAC therapy at this time in case compassionate care use is approved. See below for last in person [facility] assessment.    Manuela Collazo, MSN RN CWOCN  Pager no longer is use, please contact through [employer] group: [facility]      Consulted for: Left great toe    Patient History (according to provider note(s):      Per H+P:  54 year old male admitted on 8/10/2024. He is being admitted for left foot cellulitis.       #Left foot cellulitis, diabetic foot infection  #Left great toe osteomyelitis  -Duration of symptoms unclear as patient does not recall initial injury, infectious symptoms seem to have acutely worsened 3 days prior to admission when patient manipulated his wound  -Mild fevers but otherwise nontoxic on admission  -Left foot MRI 8/10 showing osteomyelitis changes left great toe  -Podiatry consult, plan for limited debridement 8/11  -Wound vac placed 8/12 per podiatry  -ID consult 8/11  -Continue with Vanco and Zosyn, wound culture polymicrobial  -Wound care consult  -Follow-up cultures    Assessment:      Areas visualized during today's visit:  left foot    Negative pressure wound therapy applied to: left great toe     Last photo: 8/12   Wound due to: Surgical Wound   Wound history/plan of care:    Surgical date: 8/11   Service following: podiatry  Date Negative Pressure Wound Therapy initiated: 8/12    Interventions in place: N/A  Is patient s nutritional status compromised? no   If yes, what interventions are in place? N/A  Reason for initiating vac therapy? Presence of co-morbidities  Which?of?the?following?co-morbidities?apply? Diabetes  If diabetic is patient on a diabetic management program? Yes   Is osteomyelitis present in wound? no   If yes what treatments are in place? N/A    Wound base: 100 % granulation tissue and adipose tissue     Palpation of the wound bed: normal       Drainage: small      Volume in cannister: NA - started today     Last cannister change date: 8/12     Description of drainage: serosanguinous      Measurements (length x width x depth, in cm) 3  x 1.5  x  1.5 cm       Tunneling N/A      Undermining N/A   Periwound skin: Intact       Color: normal and consistent with surrounding tissue       Temperature: normal    Odor: none   Pain: denies   Treatment goal: Increase granulation  STATUS: initial assessment   Supplies ordered: gathered    Number of foam pieces removed from a wound (excluding foam for bridge) :  NA    Verified this matched the number of foam pieces applied last dressing change: N/A   Number of foam pieces packed into wound (excluding foam for bridge) :  1 GranuFoam Black        Treatment Plan:     Negative pressure wound therapy plan:  Wound location: left great toe   Change Days:  M/Th  by WOC RN    Supplies (including all accessories) used: small  Black foam , Adapt barrier ring, and Cavilon no sting barrier film  Cleanse with Normal saline prior to replacing NPWT  Suction setting: -125   Methods used: Window paned all periwound skin with vac drape prior to applying sponge, Bridged trac pad off bony prominences, Spiral cut foam prior to packing into wound , and Placed barrier ring into periwound creases to improve seal    Staff RN to assess integrity of dressing and ensure suction is set at appropriate level every shift.   Date canister. Chart canister output every  "shift. Change cannister weekly and PRN if full/occluded     Remove foam dressing and replace with BID normal saline moist gauze dressing if   -a dressing failure which cannot be repaired within 2 hours   -patient is discharging to home without a home pump   -patient is discharging to a facility outside the local area   -if a dressing is a \"Silver Foam\", remove before Radiation Therapy or MRI     The hospital VAC pump is not to be discharged with the patient.?Ensure to disconnect patient from machine prior to discharge. Then   If a home KCI VAC pump has been delivered, connect home cannister to dressing tubing then connect cannister to home pump and turn on machine    If transferring to a nearby facility with a KCI vac, can disconnect and clamp tubing then cover end with glove so can be reconnected within 2 hours        Orders: Written    RECOMMEND PRIMARY TEAM ORDER: None, at this time  Education provided: plan of care  Discussed plan of care with: Patient and Nurse  WOC nurse follow-up plan:  M/Th  Notify WOC if wound(s) deteriorate.  Nursing to notify the Provider(s) and re-consult the WOC Nurse if new skin concern.    DATA:     Current support surface: Standard  Standard Isoflex gel  Containment of urine/stool: Continent of bladder and Continent of bowel  BMI: Body mass index is 27.96 kg/m .   Active diet order: Orders Placed This Encounter      Advance Diet as Tolerated: Regular Diet Adult; Moderate Consistent Carb (60 g CHO per Meal) Diet      Diet     Output: I/O last 3 completed shifts:  In: 750 [P.O.:750]  Out: -      Labs:   Recent Labs   Lab 08/12/24  0521 08/11/24  0642 08/10/24  1245   ALBUMIN 3.3*   < >  --    HGB 12.3*  --  13.5   WBC 6.6  --  4.9   A1C  --   --  13.3*    < > = values in this interval not displayed.     Pressure injury risk assessment:   Sensory Perception: 3-->slightly limited  Moisture: 4-->rarely moist  Activity: 3-->walks occasionally  Mobility: 3-->slightly limited  Nutrition: " 4-->excellent  Friction and Shear: 3-->no apparent problem  Gualberto Score: 20    Ryoa Garcia, BSN, RN, PHN, HNB-BC, CWOCN  Pager no longer is use, please contact through [employer] group: [facility] [employer] [facility]

## 2024-08-14 NOTE — PROGRESS NOTES
"Care Management Follow Up    Length of Stay (days): 4    Expected Discharge Date: 08/14/2024     Concerns to be Addressed: Care progression - discharge planning     Patient plan of care discussed at interdisciplinary rounds: Yes    Anticipated Discharge Disposition:  TBD     Anticipated Discharge Services:  TBD  Anticipated Discharge DME:  NA    Patient/family educated on Medicare website which has current facility and service quality ratings:  NA  Education Provided on the Discharge Plan:  Yes per team  Patient/Family in Agreement with the Plan:  NA    Referrals Placed by CM/SW:  LUAN  Private pay costs discussed: Not applicable    Additional Information:  ID discharge plan:  -Discontinue vancomycin, levofloxacin and metronidazole  -start TMP/SMX DS tid and amoxicillin/clauvulanic acid 1gm po bid x 4 weeks      Message sent to Dr. Hedrick to request antibiotics filled here    Per provider, Dr. Hedrick, the wound vac & home care could be a barrier, but he will talk with podiatry regarding the wound vac for a Prevena.  Patient has no medical insurance. Patient is in the process of working with [employer] to apply for medical insurance.    Social Hx: \"Live w/spouse/adult dtr in a house. Independent w/all I/ADLs. Work as a . Use no DME, has no services. Possible amputation, IV abx? Ref to FW. Family to transport.\"    RNCM to follow for medical progression, recommendations, and final discharge plan.     Bhavna Hill RN     "

## 2024-08-14 NOTE — PROGRESS NOTES
"Infectious Diseases Progress Note  [facility]    Date of visit: 08/14/2024     ASSESSMENT   54-year-old male with a history of poorly controlled diabetes who is admitted with left foot infection.    Left diabetic foot infection.  Patient identified foreign body.  Subsequent swelling, erythema, fevers.  MRI showing osteomyelitis at the distal phalanx.  S/p debridement 8/11 - patient declining amputation.  Wound culture on 8/10 is polymicrobial. Intra-op culture with Staph lugdunensis (MSSL), K.pneumoniae and C.freundii. Citrobacter resistant to pipercillin/tazobactam. surrounding cellulitis improving   Poorly controlled diabetes.  A1c 13.3  ABIs normal bilaterally     Active Problems:    Cellulitis of left foot    Toe infection       PLAN   -Discontinue vancomycin, levofloxacin and metronidazole  -start TMP/SMX DS tid and amoxicillin/clauvulanic acid 1gm po bid x 4 weeks    -Okay to discharge from ID perspective.   -follow-up podiatry  -ID follow-up in 2-4 weeks     Magen Ramirez MD  [facility]  Direct messaging: [employer] Paging  On-Call ID provider: [phone], option: 9      ===========================================    SUBJECTIVE / INTERVAL HISTORY:     No events. Feels well       Antibiotics   Zosyn 8/10-  Levofloxacin 8/13-  Metronidazole 8/13-    Previous:  Vancomycin 8/10-8/13      Physical Exam     Temp:  [98.2  F (36.8  C)-98.5  F (36.9  C)] 98.2  F (36.8  C)  Pulse:  [66-70] 70  Resp:  [18-20] 20  BP: (131-141)/(75-82) 131/75  SpO2:  [96 %-98 %] 98 %    /75 (BP Location: Left arm)   Pulse 70   Temp 98.2  F (36.8  C) (Oral)   Resp 20   Ht 1.651 m (5' 5\")   Wt 76.2 kg (168 lb)   SpO2 98%   BMI 27.96 kg/m      GENERAL:  well-developed, well-nourished, lying in bed in no acute distress.   HENT:  Head is normocephalic, atraumatic.   EYES:  Eyes have anicteric sclerae without conjunctival injection   LUNGS:  normal resp pattern  EXT: Extremities warm and " without edema.  left first toe with erythema improved. Wound vac in place  SKIN:  No acute rashes.    NEUROLOGIC:  Grossly nonfocal.      Cultures   8/10 blood culture: No growth to date  8/10 left toe culture: Klebsiella pneumoniae, Citrobacter freundii, group G strep, Enterococcus faecalis  8/11 left toe: K.pna and C.freundii, S.lugdunensis    Susceptibility data from last 90 days.  Collected Specimen Info Organism Ampicillin Ampicillin/Sulbactam Cefepime Ceftazidime Ceftriaxone Ciprofloxacin Clindamycin Daptomycin Doxycycline Erythromycin Gentamicin Gentamicin Synergy Levofloxacin Meropenem   08/11/24 Tissue from Toe, Left Citrobacter freundii complex                   Klebsiella pneumoniae                   Staphylococcus lugdunensis                 08/10/24 Wound from Toe, Left Klebsiella pneumoniae R  S  S  S  S  S      S   S  S     Citrobacter freundii complex R R  S R R  S      S   S  S     Enterococcus faecalis  S            S       Staphylococcus lugdunensis        S  S  S  S  S        Streptococcus canis (Group G Streptococcus)                   Collected Specimen Info Organism Oxacillin Piperacillin/Tazobactam Tetracycline Tobramycin Trimethoprim/Sulfamethoxazole  Vancomycin   08/11/24 Tissue from Toe, Left Citrobacter freundii complex           Klebsiella pneumoniae           Staphylococcus lugdunensis         08/10/24 Wound from Toe, Left Klebsiella pneumoniae   S   S  S      Citrobacter freundii complex  R   S  S      Enterococcus faecalis       S     Staphylococcus lugdunensis  S   S   S  S     Streptococcus canis (Group G Streptococcus)              Pertinent Labs:     Recent Labs   Lab 08/12/24  0521 08/10/24  1245   WBC 6.6 4.9   HGB 12.3* 13.5    259       Recent Labs   Lab 08/12/24  0521 08/11/24  0642 08/10/24  1245    136 136   CO2 26 25 28   BUN 15.3 12.8 13.8   ALBUMIN 3.3* 3.4*  --        Recent Labs   Lab 08/10/24  1245   CRPI 53.00*           Imaging:     US Lower Extremity  Arterial Duplex Bilateral    Result Date: 8/12/2024  [facility] 1. EXAM: RESTING ANKLE-BRACHIAL INDICES (ABIs) 2. DUPLEX ARTERIAL ULTRASOUND OF THE LOWER EXTREMITIES BILATERALLY 8/12/2024 2:23 PM CDT INDICATION: Diabetic foot infection. TECHNIQUE: Duplex imaging is performed utilizing gray-scale, two-dimensional images and color-flow imaging. Doppler waveform analysis and spectral Doppler imaging is also performed. COMPARISON: None FINDINGS: SEGMENTAL BP RIGHT Brachial: -- Ankle (PT): 167; Index: 1.27 Ankle (DP): 160; Index: 1.22 Digit: 125; Index: 0.95 LEFT Brachial: 131 Ankle (PT): 156; Index: 1.19 Ankle (DP): 148; Index: 1.13 Digit: 92; Index: 0.70 Resting ABIs are 1.3 on the right. Resting ABIs are  1.2 on the left. WAVEFORMS: Triphasic waveforms bilaterally. Normal waveforms and toe pressures in the bilateral toes. DUPLEX ARTERIAL ULTRASOUND FINDINGS:  (Peak Systole/End Diastole) RIGHT (cm/s) EIA: 147/23 CFA: 95/17 PFA: 68/-- SFA-Proximal: 99/17 SFA-Mid: 91/15 SFA-Distal: 71/14 Popliteal-Proximal: 65/14 Popliteal-Distal: 98/21 PTA: 84/22 KIRAN: 80/24 DPA: 40/13 LEFT (cm/s) EIA: 146/22 CFA: 95/11 PFA: 87/10 SFA-Proximal: 98/13 SFA-Mid: 110/15 SFA-Distal: 103/19 Popliteal-Proximal: 104/21 Popliteal-Distal: 117/16 PTA: 126/36 KIRAN: 130/39 DPA: 41/10 RIGHT: Right external iliac artery, common femoral artery, profunda femoral artery, SFA and popliteal arteries are widely patent with normal velocities and multiphasic waveforms. The right PTA, KIRAN and DPA are patent with multiphasic waveforms. LEFT: The left external iliac artery, common femoral artery, profunda femoral artery, SFA and popliteal arteries are widely patent with normal velocities and multiphasic waveforms. Left PTA and KIRAN/DPA are patent with normal velocities and multiphasic waveforms.     IMPRESSION: 1.  RIGHT LOWER EXTREMITY: Right leg resting CHELSEY of 1.3 is within normal limits. No significant stenosis within the right  femoral-popliteal segments. The right PTA, KIRAN and DPA are patent with multiphasic waveforms. 2.  LEFT LOWER EXTREMITY: Left leg resting CHELSEY of 1.2 is within normal limits.  No significant stenosis within the left femoral-popliteal segments. The left PTA, KIRAN and DPA are patent with multiphasic waveforms.    US CHELSEY with PPG wo Exercise    Result Date: 8/12/2024  [facility] 1. EXAM: RESTING ANKLE-BRACHIAL INDICES (ABIs) 2. DUPLEX ARTERIAL ULTRASOUND OF THE LOWER EXTREMITIES BILATERALLY 8/12/2024 2:23 PM CDT INDICATION: Diabetic foot infection. TECHNIQUE: Duplex imaging is performed utilizing gray-scale, two-dimensional images and color-flow imaging. Doppler waveform analysis and spectral Doppler imaging is also performed. COMPARISON: None FINDINGS: SEGMENTAL BP RIGHT Brachial: -- Ankle (PT): 167; Index: 1.27 Ankle (DP): 160; Index: 1.22 Digit: 125; Index: 0.95 LEFT Brachial: 131 Ankle (PT): 156; Index: 1.19 Ankle (DP): 148; Index: 1.13 Digit: 92; Index: 0.70 Resting ABIs are 1.3 on the right. Resting ABIs are  1.2 on the left. WAVEFORMS: Triphasic waveforms bilaterally. Normal waveforms and toe pressures in the bilateral toes. DUPLEX ARTERIAL ULTRASOUND FINDINGS:  (Peak Systole/End Diastole) RIGHT (cm/s) EIA: 147/23 CFA: 95/17 PFA: 68/-- SFA-Proximal: 99/17 SFA-Mid: 91/15 SFA-Distal: 71/14 Popliteal-Proximal: 65/14 Popliteal-Distal: 98/21 PTA: 84/22 KIRAN: 80/24 DPA: 40/13 LEFT (cm/s) EIA: 146/22 CFA: 95/11 PFA: 87/10 SFA-Proximal: 98/13 SFA-Mid: 110/15 SFA-Distal: 103/19 Popliteal-Proximal: 104/21 Popliteal-Distal: 117/16 PTA: 126/36 KIRAN: 130/39 DPA: 41/10 RIGHT: Right external iliac artery, common femoral artery, profunda femoral artery, SFA and popliteal arteries are widely patent with normal velocities and multiphasic waveforms. The right PTA, KIRAN and DPA are patent with multiphasic waveforms. LEFT: The left external iliac artery, common femoral artery, profunda femoral artery, SFA and popliteal  "arteries are widely patent with normal velocities and multiphasic waveforms. Left PTA and KIRAN/DPA are patent with normal velocities and multiphasic waveforms.     IMPRESSION: 1.  RIGHT LOWER EXTREMITY: Right leg resting CHELSEY of 1.3 is within normal limits. No significant stenosis within the right femoral-popliteal segments. The right PTA, KIRAN and DPA are patent with multiphasic waveforms. 2.  LEFT LOWER EXTREMITY: Left leg resting CHELSEY of 1.2 is within normal limits.  No significant stenosis within the left femoral-popliteal segments. The left PTA, KIRAN and DPA are patent with multiphasic waveforms.    POC US Guidance Needle Placement    Result Date: 8/11/2024  Ultrasound was performed as guidance to an anesthesia procedure.  Click \"PACS images\" hyperlink below to view any stored images.  For specific procedure details, view procedure note authored by anesthesia.    MR Foot Left w/o & w Contrast    Result Date: 8/10/2024  EXAM: MR FOOT LEFT W/O and W CONTRAST LOCATION: [facility] DATE: 8/10/2024 INDICATION: Diabetic foot infection, had preceding penetrating injury. COMPARISON: Radiographs from 8/10/2024. TECHNIQUE: Routine. Additional postgadolinium T1 sequences were obtained. IV CONTRAST: 7ml Gadavist FINDINGS: JOINTS AND BONES: -There is bone marrow edema like signal and enhancement and loss of T1 signal in the distal phalanx of the great toe. No effusion or synovitis. The bones are normal otherwise. The joints appear normal. TENDONS: -No tendon tear, tendinopathy, or tenosynovitis. LIGAMENTS: -Lisfranc ligament: Intact. No subluxation. MUSCLES AND SOFT TISSUES: -There is a tract of abnormal signal extending from the distal plantar skin surface of the great toe proximally and dorsally to near the distal tuft, as seen on image 5 of series 9 and image 17 of series 6 and image 31 of series 5. This may represent the  tract of a penetrating injury. There is no definite foreign body or abscess. " There is adjacent edema and enhancement consistent with cellulitis.     IMPRESSION: 1.  There appears to be a tract from a previous penetrating injury in the distal end of the great toe extending down close to the distal tuft of the distal phalanx of the great toe. 2.  There is underlying bone marrow edema like signal and enhancement as well as loss of T1 signal in the distal phalanx of the great toe, strong evidence of osteomyelitis. 3.  There is no evidence of abscess, tenosynovitis, myositis or septic joint.    Foot XR, G/E 3 views, left    Result Date: 8/10/2024  EXAM: XR FOOT LEFT G/E 3 VIEWS LOCATION: [facility] DATE: 8/10/2024 INDICATION: Swelling and redness. Evaluate for retained foreign body in the left big toe. COMPARISON: None.     IMPRESSION: There is no evidence of an acute fracture with attention to the great toe. There is some soft tissue swelling and irregularity however there is no discrete radiopaque foreign body identified. No significant joint space narrowing. No cortical destructive change to suggest osteomyelitis.         Data reviewed today: I reviewed all medications, new labs and imaging results over the last 24 hours. I personally reviewed no images or EKG's today.  The patient's care was discussed with the Patient.

## 2024-08-14 NOTE — PLAN OF CARE
"Pt is A&Ox4, calm, cooperative, very friendly, in good spirits  - vitals stable  - denies pain, nausea, discomfort   - wound vac on (left foot, greater toe) and running at 125  - pt anticipating going home tomorrow               Problem: Infection  Goal: Absence of Infection Signs and Symptoms  Outcome: Not Progressing     Problem: Adult Inpatient Plan of Care  Goal: Plan of Care Review  Description: The Plan of Care Review/Shift note should be completed every shift.  The Outcome Evaluation is a brief statement about your assessment that the patient is improving, declining, or no change.  This information will be displayed automatically on your shift  note.  Outcome: Progressing  Flowsheets (Taken 8/14/2024 0223)  Outcome Evaluation: pt excited to go home, promises to take care of T2DM better in the future  Plan of Care Reviewed With: patient  Overall Patient Progress: improving  Goal: Patient-Specific Goal (Individualized)  Description: You can add care plan individualizations to a care plan. Examples of Individualization might be:  \"Parent requests to be called daily at 9am for status\", \"I have a hard time hearing out of my right ear\", or \"Do not touch me to wake me up as it startles  me\".  Outcome: Progressing  Goal: Absence of Hospital-Acquired Illness or Injury  Outcome: Progressing  Intervention: Identify and Manage Fall Risk  Recent Flowsheet Documentation  Taken 8/14/2024 0045 by Chio Carey RN  Safety Promotion/Fall Prevention:   assistive device/personal items within reach   safety round/check completed   nonskid shoes/slippers when out of bed   patient and family education   clutter free environment maintained  Intervention: Prevent Skin Injury  Recent Flowsheet Documentation  Taken 8/14/2024 0045 by Chio Carey RN  Body Position: position changed independently  Intervention: Prevent and Manage VTE (Venous Thromboembolism) Risk  Recent Flowsheet Documentation  Taken 8/14/2024 0045 by " Balestri, Chio, RN  VTE Prevention/Management: SCDs off (sequential compression devices)  Intervention: Prevent Infection  Recent Flowsheet Documentation  Taken 8/14/2024 0045 by Chio Carey RN  Infection Prevention:   rest/sleep promoted   single patient room provided  Goal: Optimal Comfort and Wellbeing  Outcome: Progressing  Goal: Readiness for Transition of Care  Outcome: Progressing     Problem: Skin or Soft Tissue Infection  Goal: Absence of Infection Signs and Symptoms  Outcome: Progressing  Intervention: Minimize and Manage Infection Progression  Recent Flowsheet Documentation  Taken 8/14/2024 0045 by Chio Carey RN  Infection Prevention:   rest/sleep promoted   single patient room provided     Problem: Comorbidity Management  Goal: Blood Glucose Levels Within Targeted Range  Outcome: Progressing  Intervention: Monitor and Manage Glycemia  Recent Flowsheet Documentation  Taken 8/14/2024 0045 by Chio Carey RN  Medication Review/Management: medications reviewed     Problem: Pain Acute  Goal: Optimal Pain Control and Function  Outcome: Progressing  Intervention: Prevent or Manage Pain  Recent Flowsheet Documentation  Taken 8/14/2024 0045 by Chio Carey RN  Medication Review/Management: medications reviewed  Intervention: Optimize Psychosocial Wellbeing  Recent Flowsheet Documentation  Taken 8/14/2024 0045 by Chio Carey RN  Supportive Measures:   active listening utilized   positive reinforcement provided   Goal Outcome Evaluation:      Plan of Care Reviewed With: patient    Overall Patient Progress: improvingOverall Patient Progress: improving    Outcome Evaluation: pt excited to go home, promises to take care of T2DM better in the future

## 2024-08-15 VITALS
OXYGEN SATURATION: 98 % | TEMPERATURE: 98.7 F | BODY MASS INDEX: 27.99 KG/M2 | DIASTOLIC BLOOD PRESSURE: 64 MMHG | WEIGHT: 168 LBS | HEART RATE: 76 BPM | RESPIRATION RATE: 20 BRPM | SYSTOLIC BLOOD PRESSURE: 121 MMHG | HEIGHT: 65 IN

## 2024-08-15 LAB
BACTERIA BLD CULT: NO GROWTH
CREAT SERPL-MCNC: 0.76 MG/DL (ref 0.67–1.17)
EGFRCR SERPLBLD CKD-EPI 2021: >90 ML/MIN/1.73M2
GLUCOSE BLDC GLUCOMTR-MCNC: 128 MG/DL (ref 70–99)
GLUCOSE BLDC GLUCOMTR-MCNC: 156 MG/DL (ref 70–99)
GLUCOSE BLDC GLUCOMTR-MCNC: 189 MG/DL (ref 70–99)

## 2024-08-15 PROCEDURE — 250N000011 HC RX IP 250 OP 636: Performed by: HOSPITALIST

## 2024-08-15 PROCEDURE — 97605 NEG PRS WND THER DME<=50SQCM: CPT

## 2024-08-15 PROCEDURE — 82565 ASSAY OF CREATININE: CPT | Performed by: HOSPITALIST

## 2024-08-15 PROCEDURE — 250N000013 HC RX MED GY IP 250 OP 250 PS 637: Performed by: INTERNAL MEDICINE

## 2024-08-15 PROCEDURE — 250N000013 HC RX MED GY IP 250 OP 250 PS 637: Performed by: HOSPITALIST

## 2024-08-15 PROCEDURE — 99239 HOSP IP/OBS DSCHRG MGMT >30: CPT | Performed by: HOSPITALIST

## 2024-08-15 PROCEDURE — 36415 COLL VENOUS BLD VENIPUNCTURE: CPT | Performed by: HOSPITALIST

## 2024-08-15 RX ORDER — GLIPIZIDE 5 MG/1
5 TABLET, FILM COATED, EXTENDED RELEASE ORAL
Status: DISCONTINUED | OUTPATIENT
Start: 2024-08-15 | End: 2024-08-15 | Stop reason: HOSPADM

## 2024-08-15 RX ADMIN — METFORMIN HYDROCHLORIDE 500 MG: 500 TABLET, FILM COATED ORAL at 08:48

## 2024-08-15 RX ADMIN — GABAPENTIN 100 MG: 100 CAPSULE ORAL at 08:48

## 2024-08-15 RX ADMIN — INSULIN ASPART 1 UNITS: 100 INJECTION, SOLUTION INTRAVENOUS; SUBCUTANEOUS at 12:37

## 2024-08-15 RX ADMIN — INSULIN ASPART 1 UNITS: 100 INJECTION, SOLUTION INTRAVENOUS; SUBCUTANEOUS at 08:47

## 2024-08-15 RX ADMIN — GABAPENTIN 100 MG: 100 CAPSULE ORAL at 14:01

## 2024-08-15 RX ADMIN — SULFAMETHOXAZOLE AND TRIMETHOPRIM 1 TABLET: 800; 160 TABLET ORAL at 14:01

## 2024-08-15 RX ADMIN — AMOXICILLIN AND CLAVULANATE POTASSIUM 1 TABLET: 875; 125 TABLET, FILM COATED ORAL at 08:48

## 2024-08-15 RX ADMIN — SULFAMETHOXAZOLE AND TRIMETHOPRIM 1 TABLET: 800; 160 TABLET ORAL at 08:48

## 2024-08-15 RX ADMIN — GLIPIZIDE 5 MG: 5 TABLET, FILM COATED, EXTENDED RELEASE ORAL at 08:52

## 2024-08-15 RX ADMIN — ENOXAPARIN SODIUM 40 MG: 40 INJECTION SUBCUTANEOUS at 12:37

## 2024-08-15 ASSESSMENT — ACTIVITIES OF DAILY LIVING (ADL)
ADLS_ACUITY_SCORE: 21
ADLS_ACUITY_SCORE: 22
ADLS_ACUITY_SCORE: 21
ADLS_ACUITY_SCORE: 22
ADLS_ACUITY_SCORE: 21
ADLS_ACUITY_SCORE: 22
ADLS_ACUITY_SCORE: 22
ADLS_ACUITY_SCORE: 21
ADLS_ACUITY_SCORE: 22
ADLS_ACUITY_SCORE: 21
ADLS_ACUITY_SCORE: 22
ADLS_ACUITY_SCORE: 21

## 2024-08-15 NOTE — PROGRESS NOTES
Care Management Discharge Note    Discharge Date: 08/15/2024       Discharge Disposition: Home    Discharge Services: None    Discharge DME: Wound Vac    Discharge Transportation: family or friend will provide    Private pay costs discussed: Not applicable    Does the patient's insurance plan have a 3 day qualifying hospital stay waiver?  No    PAS Confirmation Code:  NA  Patient/family educated on Medicare website which has current facility and service quality ratings: NA    Education Provided on the Discharge Plan: Yes per team  Persons Notified of Discharge Plans: Patient  Patient/Family in Agreement with the Plan: yes    Handoff Referral Completed: Yes    Additional Information:  Patient discharge home with wound vac. Family will transport.    Bhavna Hill RN

## 2024-08-15 NOTE — PROGRESS NOTES
[facility] Nurse Inpatient Assessment     Consulted for: Left great toe    Summary: WO nurse changed the NPWT dressing today 8/15. Patient may not be able to continue NPWT after discharge.  Alternative dressing plan reviewed with MD, nurse and patient, and entered in [facility] discharge orders.    Patient History (according to provider note(s):      Per H+P:  54 year old male admitted on 8/10/2024. He is being admitted for left foot cellulitis.       #Left foot cellulitis, diabetic foot infection  #Left great toe osteomyelitis  -Duration of symptoms unclear as patient does not recall initial injury, infectious symptoms seem to have acutely worsened 3 days prior to admission when patient manipulated his wound  -Mild fevers but otherwise nontoxic on admission  -Left foot MRI 8/10 showing osteomyelitis changes left great toe  -Podiatry consult, plan for limited debridement 8/11  -Wound vac placed 8/12 per podiatry  -ID consult 8/11  -Continue with Vanco and Zosyn, wound culture polymicrobial  -Wound care consult  -Follow-up cultures    Assessment:      Areas visualized during today's visit:  left foot    Negative pressure wound therapy applied to: left great toe       8/12       8/15   Wound due to: Surgical Wound   Wound history/plan of care:    Surgical date: 8/11   Service following: podiatry  Date Negative Pressure Wound Therapy initiated: 8/12   Interventions in place: N/A  Is patient s nutritional status compromised? no   If yes, what interventions are in place? N/A  Reason for initiating vac therapy? Presence of co-morbidities  Which?of?the?following?co-morbidities?apply? Diabetes  If diabetic is patient on a diabetic management program? Yes   Is osteomyelitis present in wound? Y   If yes what treatments are in place? IV abx    Wound base: 100 % granulation tissue     Palpation of the wound bed: normal       Drainage: small      Volume in cannister: <50     Last cannister change date:  "8/12     Description of drainage: serosanguinous      Measurements (length x width x depth, in cm) 2.6  x 1  x  1.2 cm  depth in center     Tunneling N/A      Undermining N/A   Periwound skin: Intact       Color: normal and consistent with surrounding tissue       Temperature: normal    Odor: none   Pain: denies   Treatment goal: Increase granulation  STATUS: improving   Supplies ordered: gathered    Number of foam pieces removed from a wound (excluding foam for bridge) :  1 GranDenoam Black   Verified this matched the number of foam pieces applied last dressing change: N/A   Number of foam pieces packed into wound (excluding foam for bridge) :  1 GranDenoam Black        Treatment Plan:     Negative pressure wound therapy plan:  Wound location: left great toe   Change Days:  M/Th  by WOC RN    Supplies (including all accessories) used: small  Black foam , Adapt barrier ring, and Cavilon no sting barrier film  Cleanse with Normal saline prior to replacing NPWT  Suction setting: -125   Methods used: Window paned all periwound skin with vac drape prior to applying sponge, Bridged trac pad off bony prominences, Spiral cut foam prior to packing into wound , and Placed barrier ring into periwound creases to improve seal    Staff RN to assess integrity of dressing and ensure suction is set at appropriate level every shift.   Date canister. Chart canister output every shift. Change cannister weekly and PRN if full/occluded     Remove foam dressing and replace with BID normal saline moist gauze dressing if   -a dressing failure which cannot be repaired within 2 hours   -patient is discharging to home without a home pump   -patient is discharging to a facility outside the local area   -if a dressing is a \"Silver Foam\", remove before Radiation Therapy or MRI     The hospital VAC pump is not to be discharged with the patient.?Ensure to disconnect patient from machine prior to discharge. Then   If a home KCI VAC pump has been " "delivered, connect home cannister to dressing tubing then connect cannister to home pump and turn on machine    If transferring to a nearby facility with a KCI vac, can disconnect and clamp tubing then cover end with glove so can be reconnected within 2 hours      ALTERNATIVE PLAN OF CARE (if unable to continue NPWT)::  Left great toe:  Cleanse with Vashe and ensure skin around wound is dry  Soak Nugauze 1/4\" strip with Vashe and pack gently  Cover with dry gauze and secure with tape  Change daily and as needed if drainage increases      Orders: Reviewed and Updated    RECOMMEND PRIMARY TEAM ORDER: None, at this time  Education provided: plan of care  Discussed plan of care with: Patient and Nurse  [facility] nurse follow-up plan:  M/Th  Notify WOC if wound(s) deteriorate.  Nursing to notify the Provider(s) and re-consult the [facility] Nurse if new skin concern.    DATA:     Current support surface: Standard  Standard Isoflex gel  Containment of urine/stool: Continent of bladder and Continent of bowel  BMI: Body mass index is 27.96 kg/m .   Active diet order: Orders Placed This Encounter      Advance Diet as Tolerated: Regular Diet Adult; Moderate Consistent Carb (60 g CHO per Meal) Diet      Diet     Output: No intake/output data recorded.     Labs:   Recent Labs   Lab 08/12/24  0521 08/11/24  0642 08/10/24  1245   ALBUMIN 3.3*   < >  --    HGB 12.3*  --  13.5   WBC 6.6  --  4.9   A1C  --   --  13.3*    < > = values in this interval not displayed.     Pressure injury risk assessment:   Sensory Perception: 3-->slightly limited  Moisture: 4-->rarely moist  Activity: 3-->walks occasionally  Mobility: 3-->slightly limited  Nutrition: 4-->excellent  Friction and Shear: 3-->no apparent problem  Gualberto Score: 20    VAL FelixN, RN, PHN, HNB-BC, CWOCN  Pager no longer is use, please contact through [employer] group: [facility] [employer]     "

## 2024-08-15 NOTE — PLAN OF CARE
Goal Outcome Evaluation:                  Patient discharged. Bed side teaching on use of new wound vac done. All questions answered. Paperwork supplied. Patient down in wheel chair to family vehicle.

## 2024-08-15 NOTE — DISCHARGE SUMMARY
"[facility]  Hospitalist Discharge Summary      Date of Admission:  8/10/2024  Date of Discharge:  8/15/2024  6:07 PM  Discharging Provider: Neel Hedrick MD  Discharge Service: Hospitalist Service    Discharge Diagnoses   Poorly controlled diabetes type 2  Diabetic foot infection  Toe osteomyelitis    Clinically Significant Risk Factors     # DMII: A1C = 13.3 % (Ref range: <5.7 %) within past 6 months    # Overweight: Estimated body mass index is 27.96 kg/m  as calculated from the following:    Height as of this encounter: 1.651 m (5' 5\").    Weight as of this encounter: 76.2 kg (168 lb).       Follow-ups Needed After Discharge   Follow-up Appointments     Follow-up and recommended labs and tests       Follow-up with ID CLINIC as instructed.        Follow-up and recommended labs and tests       Follow up with primary care provider, Karen Jimenez, within 7 days   for hospital follow- up.  No follow up labs or test are needed.  Presenta a la clinica de heridacuidados la semana que viene.  Renetta las pastillas antibioticas por a lo menos cuatro semanas, quizas seis   depende del progreso y las discusiones con los doctores en clinica.            Unresulted Labs Ordered in the Past 30 Days of this Admission       Date and Time Order Name Status Description    8/11/2024  6:30 PM Anaerobic Bacterial Culture Routine Preliminary     8/11/2024  6:29 PM Fungal or Yeast Culture Routine Preliminary         These results will be followed up by hospital medicine    Discharge Disposition   Discharged to home  Condition at discharge: Stable    Hospital Course   Petar Fermin is a 54 year old male admitted on 8/10/2024. He is being admitted for left foot cellulitis.       #Left foot cellulitis, diabetic foot infection  #Left great toe osteomyelitis  -Duration of symptoms unclear as patient does not recall initial injury, infectious symptoms seem to have acutely worsened 3 days prior to admission " when patient manipulated his wound  -Mild fevers but otherwise nontoxic on admission  -Left foot MRI 8/10 showing osteomyelitis changes left great toe  -Podiatry consult, plan for limited debridement 8/11  -Wound vac placed 8/12 per podiatry  -ID consult 8/11  -Wound culture polymicrobial, surgical cultures similar  -Continues on vancomycin, Zosyn switched to levofloxacin Flagyl 8/13 for Citrobacter result  -Per ID, transition to orals 8/14 with Bactrim DS 3 times daily and Augmentin twice daily for 4 weeks  -Now awaiting arrangements for home wound VAC, patient lacks insurance/resources for this so applying for financial support  -Care manager arranged compassionate care wound VAC coverage, wound care nurse and podiatry will see in clinic Monday or Tuesday next week for wound VAC exchange and exam  -Follow-up with ID in 2 weeks to discuss progress, tolerance of antibiotics  -Wound care consult  -Follow-up cultures     #Type 2 diabetes mellitus complicated by hyperglycemia and peripheral neuropathy  -A1c over 13%, blood sugar in the 300s on admission likely from infectious stress  -Routinely takes metformin, held initially, resuming 8/13  -Moderate correctional scale ordered, titrate as needed  -Carb ratio added 8/13  -5 units Lantus started 8/11  -Added glipizide to home regimen for simplicity and added antihyperglycemic's, options limited by insurance status     #Hyperlipidemia  -Resume usual atorvastatin    Consultations This Hospital Stay   PODIATRY IP CONSULT  CARE MANAGEMENT / SOCIAL WORK IP CONSULT  PHARMACY TO DOSE VANCO  INFECTIOUS DISEASES IP CONSULT  WOUND OSTOMY CONTINENCE NURSE  IP CONSULT    Code Status   Prior    Time Spent on this Encounter   I, Neel Hedrick MD, personally saw the patient today and spent greater than 30 minutes discharging this patient.       Neel Hedrick MD  [address] [address]  Phone: [phone]  Fax:  [phone]  ______________________________________________________________________    Physical Exam   Vital Signs: Temp: 98.7  F (37.1  C) Temp src: Oral BP: 121/64 Pulse: 76   Resp: 20 SpO2: 98 % O2 Device: None (Room air)    Weight: 168 lbs 0 oz  Stable, wound VAC benign, no edema       Primary Care Physician   Karen Jimenez    Discharge Orders      Reason for your hospital stay    Infection de los huesos del la pie.     Follow-up and recommended labs and tests     Follow-up with ID CLINIC as instructed.     Activity    Your activity upon discharge: NO WEIGHT BEARING TO LEFT FOOT     Reason for your hospital stay    Toe and bone infection.     Follow-up and recommended labs and tests     Follow up with primary care provider, Karen Jimenez, within 7 days for hospital follow- up.  No follow up labs or test are needed.  Presenta a la clinica de heridacuidados la semana que viene.  Renetta las pastillas antibioticas por a lo menos cuatro semanas, quizas seis depende del progreso y las discusiones con los doctores en clinica.     Activity    Your activity upon discharge: no weight bearing to foot until further.     Diet    Follow this diet upon discharge: Orders Placed This Encounter      Advance Diet as Tolerated: Regular Diet Adult; Moderate Consistent Carb (60 g CHO per Meal) Diet       Significant Results and Procedures   Most Recent 3 CBC's:  Recent Labs   Lab Test 08/12/24  0521 08/10/24  1245   WBC 6.6 4.9   HGB 12.3* 13.5   MCV 93 92    259     Most Recent 3 BMP's:  Recent Labs   Lab Test 08/15/24  1227 08/15/24  0751 08/15/24  0516 08/15/24  0200 08/14/24  0814 08/14/24  0731 08/13/24  0806 08/13/24  0525 08/12/24  0813 08/12/24  0521 08/11/24  0739 08/11/24  0642 08/10/24  1408 08/10/24  1245   NA  --   --   --   --   --   --   --   --   --  138  --  136  --  136   POTASSIUM  --   --   --   --   --   --   --   --   --  4.0  --  4.0  --  4.5   CHLORIDE  --   --   --   --   --   --   --   --    --  103  --  101  --  98   CO2  --   --   --   --   --   --   --   --   --  26  --  25  --  28   BUN  --   --   --   --   --   --   --   --   --  15.3  --  12.8  --  13.8   CR  --   --  0.76  --   --  0.67  --  0.71  --  0.65*  --  0.71  --  0.62*   ANIONGAP  --   --   --   --   --   --   --   --   --  9  --  10  --  10   IVIS  --   --   --   --   --   --   --   --   --  8.6*  --  8.2*  --  8.6*   * 156*  --  128*   < >  --    < >  --    < > 255*   < > 274*   < > 341*    < > = values in this interval not displayed.     Most Recent 2 LFT's:No lab results found.,   Results for orders placed or performed during the hospital encounter of 08/10/24   Foot XR, G/E 3 views, left    Narrative    EXAM: XR FOOT LEFT G/E 3 VIEWS  LOCATION: [facility]  DATE: 8/10/2024    INDICATION: Swelling and redness. Evaluate for retained foreign body in the left big toe.  COMPARISON: None.      Impression    IMPRESSION: There is no evidence of an acute fracture with attention to the great toe. There is some soft tissue swelling and irregularity however there is no discrete radiopaque foreign body identified. No significant joint space narrowing. No cortical   destructive change to suggest osteomyelitis.   MR Foot Left w/o & w Contrast    Narrative    EXAM: MR FOOT LEFT W/O and W CONTRAST  LOCATION: [facility]  DATE: 8/10/2024    INDICATION: Diabetic foot infection, had preceding penetrating injury.  COMPARISON: Radiographs from 8/10/2024.  TECHNIQUE: Routine. Additional postgadolinium T1 sequences were obtained.  IV CONTRAST: 7ml Gadavist    FINDINGS:     JOINTS AND BONES:   -There is bone marrow edema like signal and enhancement and loss of T1 signal in the distal phalanx of the great toe. No effusion or synovitis. The bones are normal otherwise. The joints appear normal.    TENDONS:   -No tendon tear, tendinopathy, or tenosynovitis.     LIGAMENTS:   -Lisfranc ligament: Intact. No  "subluxation.    MUSCLES AND SOFT TISSUES:   -There is a tract of abnormal signal extending from the distal plantar skin surface of the great toe proximally and dorsally to near the distal tuft, as seen on image 5 of series 9 and image 17 of series 6 and image 31 of series 5. This may represent the   tract of a penetrating injury. There is no definite foreign body or abscess. There is adjacent edema and enhancement consistent with cellulitis.      Impression    IMPRESSION:  1.  There appears to be a tract from a previous penetrating injury in the distal end of the great toe extending down close to the distal tuft of the distal phalanx of the great toe.  2.  There is underlying bone marrow edema like signal and enhancement as well as loss of T1 signal in the distal phalanx of the great toe, strong evidence of osteomyelitis.  3.  There is no evidence of abscess, tenosynovitis, myositis or septic joint.   POC US Guidance Needle Placement    Narrative    Ultrasound was performed as guidance to an anesthesia procedure.  Click   \"PACS images\" hyperlink below to view any stored images.  For specific   procedure details, view procedure note authored by anesthesia.   US Lower Extremity Arterial Duplex Bilateral    [facility]    1. EXAM: RESTING ANKLE-BRACHIAL INDICES (ABIs)   2. DUPLEX ARTERIAL ULTRASOUND OF THE LOWER EXTREMITIES BILATERALLY  8/12/2024 2:23 PM CDT    INDICATION: Diabetic foot infection.  TECHNIQUE: Duplex imaging is performed utilizing gray-scale, two-dimensional images and color-flow imaging. Doppler waveform analysis and spectral Doppler imaging is also performed.  COMPARISON: None    FINDINGS:     SEGMENTAL BP  RIGHT  Brachial: --  Ankle (PT): 167; Index: 1.27  Ankle (DP): 160; Index: 1.22  Digit: 125; Index: 0.95    LEFT  Brachial: 131  Ankle (PT): 156; Index: 1.19  Ankle (DP): 148; Index: 1.13  Digit: 92; Index: 0.70    Resting ABIs are 1.3 on the right.    Resting ABIs " are  1.2 on the left.    WAVEFORMS: Triphasic waveforms bilaterally. Normal waveforms and toe pressures in the bilateral toes.    DUPLEX ARTERIAL ULTRASOUND FINDINGS:  (Peak Systole/End Diastole)  RIGHT (cm/s)  EIA: 147/23  CFA: 95/17  PFA: 68/--  SFA-Proximal: 99/17  SFA-Mid: 91/15  SFA-Distal: 71/14  Popliteal-Proximal: 65/14   Popliteal-Distal: 98/21   PTA: 84/22  KIRAN: 80/24  DPA: 40/13    LEFT (cm/s)  EIA: 146/22  CFA: 95/11  PFA: 87/10  SFA-Proximal: 98/13  SFA-Mid: 110/15  SFA-Distal: 103/19  Popliteal-Proximal: 104/21   Popliteal-Distal: 117/16   PTA: 126/36  KIRAN: 130/39  DPA: 41/10    RIGHT: Right external iliac artery, common femoral artery, profunda femoral artery, SFA and popliteal arteries are widely patent with normal velocities and multiphasic waveforms. The right PTA, KIRAN and DPA are patent with multiphasic waveforms.    LEFT: The left external iliac artery, common femoral artery, profunda femoral artery, SFA and popliteal arteries are widely patent with normal velocities and multiphasic waveforms. Left PTA and KIRAN/DPA are patent with normal velocities and multiphasic   waveforms.      Impression    IMPRESSION:  1.  RIGHT LOWER EXTREMITY: Right leg resting CHELSEY of 1.3 is within normal limits. No significant stenosis within the right femoral-popliteal segments. The right PTA, KIRAN and DPA are patent with multiphasic waveforms.  2.  LEFT LOWER EXTREMITY: Left leg resting CHELSEY of 1.2 is within normal limits.  No significant stenosis within the left femoral-popliteal segments. The left PTA, KIRAN and DPA are patent with multiphasic waveforms.   US CHELSEY with PPG wo [facility]    1. EXAM: RESTING ANKLE-BRACHIAL INDICES (ABIs)   2. DUPLEX ARTERIAL ULTRASOUND OF THE LOWER EXTREMITIES BILATERALLY  8/12/2024 2:23 PM CDT    INDICATION: Diabetic foot infection.  TECHNIQUE: Duplex imaging is performed utilizing gray-scale, two-dimensional images and color-flow imaging.  Doppler waveform analysis and spectral Doppler imaging is also performed.  COMPARISON: None    FINDINGS:     SEGMENTAL BP  RIGHT  Brachial: --  Ankle (PT): 167; Index: 1.27  Ankle (DP): 160; Index: 1.22  Digit: 125; Index: 0.95    LEFT  Brachial: 131  Ankle (PT): 156; Index: 1.19  Ankle (DP): 148; Index: 1.13  Digit: 92; Index: 0.70    Resting ABIs are 1.3 on the right.    Resting ABIs are  1.2 on the left.    WAVEFORMS: Triphasic waveforms bilaterally. Normal waveforms and toe pressures in the bilateral toes.    DUPLEX ARTERIAL ULTRASOUND FINDINGS:  (Peak Systole/End Diastole)  RIGHT (cm/s)  EIA: 147/23  CFA: 95/17  PFA: 68/--  SFA-Proximal: 99/17  SFA-Mid: 91/15  SFA-Distal: 71/14  Popliteal-Proximal: 65/14   Popliteal-Distal: 98/21   PTA: 84/22  KIRAN: 80/24  DPA: 40/13    LEFT (cm/s)  EIA: 146/22  CFA: 95/11  PFA: 87/10  SFA-Proximal: 98/13  SFA-Mid: 110/15  SFA-Distal: 103/19  Popliteal-Proximal: 104/21   Popliteal-Distal: 117/16   PTA: 126/36  KIRAN: 130/39  DPA: 41/10    RIGHT: Right external iliac artery, common femoral artery, profunda femoral artery, SFA and popliteal arteries are widely patent with normal velocities and multiphasic waveforms. The right PTA, KIRAN and DPA are patent with multiphasic waveforms.    LEFT: The left external iliac artery, common femoral artery, profunda femoral artery, SFA and popliteal arteries are widely patent with normal velocities and multiphasic waveforms. Left PTA and KIRAN/DPA are patent with normal velocities and multiphasic   waveforms.      Impression    IMPRESSION:  1.  RIGHT LOWER EXTREMITY: Right leg resting CHELSEY of 1.3 is within normal limits. No significant stenosis within the right femoral-popliteal segments. The right PTA, KIRAN and DPA are patent with multiphasic waveforms.  2.  LEFT LOWER EXTREMITY: Left leg resting CHELSEY of 1.2 is within normal limits.  No significant stenosis within the left femoral-popliteal segments. The left PTA, KIRAN and DPA are patent with  multiphasic waveforms.       Discharge Medications   Discharge Medication List as of 8/15/2024  4:40 PM        START taking these medications    Details   glipiZIDE (GLUCOTROL XL) 10 MG 24 hr tablet Take 1 tablet (10 mg) by mouth daily, Disp-30 tablet, R-2, E-Prescribe           CONTINUE these medications which have CHANGED    Details   amoxicillin-clavulanate (AUGMENTIN) 875-125 MG tablet Take 1 tablet by mouth every 12 hours, Disp-56 tablet, R-0, E-PrescribeSend to [facility]      sulfamethoxazole-trimethoprim (BACTRIM DS) 800-160 MG tablet Take 1 tablet by mouth 3 times daily, Disp-84 tablet, R-0, E-PrescribeSend to [facility]           CONTINUE these medications which have NOT CHANGED    Details   ibuprofen (ADVIL/MOTRIN) 200 MG tablet Take 400 mg by mouth every 6 hours as needed for pain, Historical      metFORMIN (GLUCOPHAGE XR) 500 MG 24 hr tablet Take 2,000 mg by mouth daily (with dinner), Historical           Allergies   No Known Allergies

## 2024-08-15 NOTE — PLAN OF CARE
Goal Outcome Evaluation:     Pt is A&Ox4, up independently in room using walker, non-weight bearing to LLE. Reprots chronic numbness/tingling in anthony LE, L toe wound vac patent with no drainage. Denied pain. VSS, afebrile. Eating/drinking well. Received  oral abx for discharge     Problem: Adult Inpatient Plan of Care  Goal: Optimal Comfort and Wellbeing  Outcome: Progressing  Goal: Readiness for Transition of Care  Outcome: Progressing     Problem: Skin or Soft Tissue Infection  Goal: Absence of Infection Signs and Symptoms  Outcome: Progressing  Intervention: Minimize and Manage Infection Progression  Recent Flowsheet Documentation  Taken 8/15/2024 0855 by Nini Richmond RN  Infection Prevention:   equipment surfaces disinfected   hand hygiene promoted   personal protective equipment utilized   rest/sleep promoted   single patient room provided     Problem: Comorbidity Management  Goal: Blood Glucose Levels Within Targeted Range  Outcome: Progressing     Problem: Infection  Goal: Absence of Infection Signs and Symptoms  Outcome: Progressing     Problem: Pain Acute  Goal: Optimal Pain Control and Function  Outcome: Progressing     Problem: Adult Inpatient Plan of Care  Goal: Absence of Hospital-Acquired Illness or Injury  Intervention: Identify and Manage Fall Risk  Recent Flowsheet Documentation  Taken 8/15/2024 0855 by Nini Richmond RN  Safety Promotion/Fall Prevention:   activity supervised   assistive device/personal items within reach   nonskid shoes/slippers when out of bed   mobility aid in reach   patient and family education  Intervention: Prevent Skin Injury  Recent Flowsheet Documentation  Taken 8/15/2024 0855 by Nini Richmond RN  Body Position: position changed independently  Intervention: Prevent Infection  Recent Flowsheet Documentation  Taken 8/15/2024 0855 by Nini Richmond RN  Infection Prevention:   equipment surfaces disinfected   hand hygiene promoted   personal protective equipment  utilized   rest/sleep promoted   single patient room provided

## 2024-08-15 NOTE — PROGRESS NOTES
"Care Management Follow Up    Length of Stay (days): 5    Expected Discharge Date: 08/15/2024     Concerns to be Addressed: Care progression - discharge planning     Patient plan of care discussed at interdisciplinary rounds: Yes    Anticipated Discharge Disposition:  TBD     Anticipated Discharge Services:  TBD  Anticipated Discharge DME:  NA    Patient/family educated on Medicare website which has current facility and service quality ratings:  NA  Education Provided on the Discharge Plan:  Yes per team  Patient/Family in Agreement with the Plan:  NA    Referrals Placed by CM/SW:  NA  Private pay costs discussed: Not applicable    Additional Information:  Sent message to Serina with  regarding the wound vac order.  Application for the [address] [place] [address] [facility] completed and faxed to [phone].  Called and spoke with patient's daughter, Kinjal, [phone].  Patient has no primary MD. He goes to [facility] and private pay by sliding scale fee. The clinic will not see patient for the wound vac care/management.     Social Hx: \"Live w/spouse/adult dtr in a house. Independent w/all I/ADLs. Work as a . Use no DME, has no services. Possible amputation, IV abx? Ref to FW. Family to transport.\"    RNCM to follow for medical progression, recommendations, and final discharge plan.     Bhavna Hill, RN     8437 rec'd a message from Serina, \"I saw his order was successfully submitted last night.\"    1030 called [facility] [phone] and spoke with Hedy, checked for  Wound care order.  Spoke with Danny to schedule 1st appt for wound vac dressing change: Mon 8/19 0800 with arrival at 0745. 1st appt with Dr. Braswell 8/22 7891.    [address]  [place] [address]    Met with patient at bedside to update on the above  Called to update patient's daughter, Kinjal    6781  rec'd message from Serina, \"Does the patient have pending Medicaid?\"  Sent a message to inform " "Serina, patient is working with the financial worker to apply for medical insurance.     1021  rec'd message from Serina, \"if a patient has pending [place] there is a [place] that needs to be filled out and signed by the same provider who signed the prescription. I ll attach it here in case you need it (needs to be filled out completely, if anything is not applicable to the patient put  N/A ).\"    Serina called and have not been able to get a signed order from podiatrist.   RNCM requested to send the order to Dr. Hedrick    1237  rec'd message from Serina, \"..have Dr. Hedrick sign and date inside the black prescriber box at the end of section two.\"    Printed and Dr. Hedrick signed. Faxed.    1248 rec'd a message from Serina, \"Do you know if the patient will still be working while receiving treatment with the wound vac? Just curious if his monthly income will be impacted if he s out of work for a bit.\"  Informed Serina, Dr. Hedrick said patient should be able to work  Met with patient at bedside and he verified he will be able to work, \"I'm a . I can still use my right foot.\"    1348 rec'd message from Serina, \"I asked my review team to send it over to [facility] to see if they qualify. So, I ll let you know what they say! I m going to reach out to the [facility] team to see if they think Prevena for Wounds (different negative pressure device that can be hooked up to granufoam dressing) would work for this patient as well just in case they don t qualify for [facility].\"  Sent message to Serina, the [facility] nurse said yesterday, the Prevena is not appropriate.    1359 rec'd message from Serina, \"...sent the order to our rapid team so hopefully they ll be able to get back to us with an answer shortly.\"    1441 rec'd message from Serina, \"They re reviewing now!\"    1446 rec'd message from Serina, \"They just released! Using the patient s Pending [place]MA.\" Will need proof of delivery.    Message sent to update . " Floridalma  Called to update charge nurse, Liana ANDRADECM picked up the [employer] wound vac from storage: [id number]  Order number [id number]    Printed the Proof of Delivery (POD)  Called to update patient's daughter, Kinjal    Meet with patient at bedside to deliver the wound vac and update on the upcoming appts. Instructed patient to bring the wound vac and supplies to the appt.  Review with patient and he signed the POD  Faxed the POD to [phone]

## 2024-08-15 NOTE — PLAN OF CARE
Problem: Comorbidity Management  Goal: Blood Glucose Levels Within Targeted Range  Outcome: Progressing     Problem: Infection  Goal: Absence of Infection Signs and Symptoms  Outcome: Progressing     Problem: Pain Acute  Goal: Optimal Pain Control and Function  Intervention: Optimize Psychosocial Wellbeing  Recent Flowsheet Documentation  Taken 8/14/2024 1700 by Yaya Yousif RN  Supportive Measures: active listening utilized   Goal Outcome Evaluation:               Patient A and O times 4. Independent in room, LLE wound vac functioning well, no drainage. Dressing in place. Patient ambulates well with walker, NWB on LLE. No pain verbalized this shift. Blood sugars 135 at dinner and 302 at HS. Patient lost IV access. IV Vancomycin  still on MAR even though patient is currently on 4 different PO ABX. House officer updated, said it was fine to not give vanco.  Patient awaiting discharge depending on home care services R/T wound care.

## 2024-08-15 NOTE — PROGRESS NOTES
[facility] Nurse Inpatient Assessment     8.15.24 Continued working with care team and product rep to help with discharge planning. Home VAC was approved for pt use and chart review indicates RN CM has set up appts at wound clinic next for patient follow up/dressing changes. See below for [facility] assessment by another team member earlier today.    Manuela Collazo, MSN RN CWOCN  Pager no longer is use, please contact through [employer] group: [facility] [employer]      Consulted for: Left great toe    Summary: [facility] nurse changed the NPWT dressing today 8/15. Patient may not be able to continue NPWT after discharge.  Alternative dressing plan reviewed with MD, nurse and patient, and entered in [facility] discharge orders.    Patient History (according to provider note(s):      Per H+P:  54 year old male admitted on 8/10/2024. He is being admitted for left foot cellulitis.       #Left foot cellulitis, diabetic foot infection  #Left great toe osteomyelitis  -Duration of symptoms unclear as patient does not recall initial injury, infectious symptoms seem to have acutely worsened 3 days prior to admission when patient manipulated his wound  -Mild fevers but otherwise nontoxic on admission  -Left foot MRI 8/10 showing osteomyelitis changes left great toe  -Podiatry consult, plan for limited debridement 8/11  -Wound vac placed 8/12 per podiatry  -ID consult 8/11  -Continue with Reji and Zosyn, wound culture polymicrobial  -Wound care consult  -Follow-up cultures    Assessment:      Areas visualized during today's visit:  left foot    Negative pressure wound therapy applied to: left great toe       8/12       8/15   Wound due to: Surgical Wound   Wound history/plan of care:    Surgical date: 8/11   Service following: podiatry  Date Negative Pressure Wound Therapy initiated: 8/12   Interventions in place: N/A  Is patient s nutritional status compromised? no   If yes, what  interventions are in place? N/A  Reason for initiating vac therapy? Presence of co-morbidities  Which?of?the?following?co-morbidities?apply? Diabetes  If diabetic is patient on a diabetic management program? Yes   Is osteomyelitis present in wound? Y   If yes what treatments are in place? IV abx    Wound base: 100 % granulation tissue     Palpation of the wound bed: normal       Drainage: small      Volume in cannister: <50     Last cannister change date: 8/12     Description of drainage: serosanguinous      Measurements (length x width x depth, in cm) 2.6  x 1  x  1.2 cm  depth in center     Tunneling N/A      Undermining N/A   Periwound skin: Intact       Color: normal and consistent with surrounding tissue       Temperature: normal    Odor: none   Pain: denies   Treatment goal: Increase granulation  STATUS: improving   Supplies ordered: gathered    Number of foam pieces removed from a wound (excluding foam for bridge) :  1 GranDenoam Black   Verified this matched the number of foam pieces applied last dressing change: N/A   Number of foam pieces packed into wound (excluding foam for bridge) :  1 Mcoam Black        Treatment Plan:     Negative pressure wound therapy plan:  Wound location: left great toe   Change Days:  M/Th  by WOC RN    Supplies (including all accessories) used: small  Black foam , Adapt barrier ring, and Cavilon no sting barrier film  Cleanse with Normal saline prior to replacing NPWT  Suction setting: -125   Methods used: Window paned all periwound skin with vac drape prior to applying sponge, Bridged trac pad off bony prominences, Spiral cut foam prior to packing into wound , and Placed barrier ring into periwound creases to improve seal    Staff RN to assess integrity of dressing and ensure suction is set at appropriate level every shift.   Date canister. Chart canister output every shift. Change cannister weekly and PRN if full/occluded     Remove foam dressing and replace with BID normal  "saline moist gauze dressing if   -a dressing failure which cannot be repaired within 2 hours   -patient is discharging to home without a home pump   -patient is discharging to a facility outside the local area   -if a dressing is a \"Silver Foam\", remove before Radiation Therapy or MRI     The hospital VAC pump is not to be discharged with the patient.?Ensure to disconnect patient from machine prior to discharge. Then   If a home KCI VAC pump has been delivered, connect home cannister to dressing tubing then connect cannister to home pump and turn on machine    If transferring to a nearby facility with a KCI vac, can disconnect and clamp tubing then cover end with glove so can be reconnected within 2 hours      ALTERNATIVE PLAN OF CARE (if unable to continue NPWT)::  Left great toe:  Cleanse with Vashe and ensure skin around wound is dry  Soak Nugauze 1/4\" strip with Vashe and pack gently  Cover with dry gauze and secure with tape  Change daily and as needed if drainage increases      Orders: Reviewed and Updated    RECOMMEND PRIMARY TEAM ORDER: None, at this time  Education provided: plan of care  Discussed plan of care with: Patient and Nurse  WOC nurse follow-up plan:  M/Th  Notify WOC if wound(s) deteriorate.  Nursing to notify the Provider(s) and re-consult the WOC Nurse if new skin concern.    DATA:     Current support surface: Standard  Standard Isoflex gel  Containment of urine/stool: Continent of bladder and Continent of bowel  BMI: Body mass index is 27.96 kg/m .   Active diet order: Orders Placed This Encounter      Advance Diet as Tolerated: Regular Diet Adult; Moderate Consistent Carb (60 g CHO per Meal) Diet      Diet     Output: I/O last 3 completed shifts:  In: 860 [P.O.:860]  Out: 0      Labs:   Recent Labs   Lab 08/12/24  0521 08/11/24  0642 08/10/24  1245   ALBUMIN 3.3*   < >  --    HGB 12.3*  --  13.5   WBC 6.6  --  4.9   A1C  --   --  13.3*    < > = values in this interval not displayed. "     Pressure injury risk assessment:   Sensory Perception: 3-->slightly limited  Moisture: 4-->rarely moist  Activity: 3-->walks occasionally  Mobility: 3-->slightly limited  Nutrition: 4-->excellent  Friction and Shear: 3-->no apparent problem  Gualberto Score: 20    Roya Garcia, VALN, RN, PHN, HNB-BC, CWOCN  Pager no longer is use, please contact through [place] group: [facility] [employer]

## 2024-08-15 NOTE — PLAN OF CARE
Problem: Comorbidity Management  Goal: Blood Glucose Levels Within Targeted Range  Outcome: Progressing     Problem: Infection  Goal: Absence of Infection Signs and Symptoms  Outcome: Progressing     Problem: Pain Acute  Goal: Optimal Pain Control and Function  Outcome: Progressing  Intervention: Optimize Psychosocial Wellbeing  Recent Flowsheet Documentation  Taken 8/15/2024 0000 by Belle Patel RN  Supportive Measures: active listening utilized   Goal Outcome Evaluation:         No significant events overnight. A/Ox4, denies of pain, VSS, afebrile, on RA. Continent x2. BG @0200 128. No PIV access. Sticky note placed in regards to IV vanco. Per ID note, to discontinue IV vanco. Pt on oral abx. Wound vac to L big toe CDI @125.

## 2024-08-16 PROBLEM — L97.529 DIABETIC ULCER OF LEFT GREAT TOE (H): Status: ACTIVE | Noted: 2024-08-16

## 2024-08-16 PROBLEM — E11.621 DIABETIC ULCER OF LEFT GREAT TOE (H): Status: ACTIVE | Noted: 2024-08-16

## 2024-08-18 LAB — BACTERIA TISS BX CULT: NORMAL

## 2024-08-19 ENCOUNTER — OFFICE VISIT (OUTPATIENT)
Dept: VASCULAR SURGERY | Facility: CLINIC | Age: 54
End: 2024-08-19
Attending: PODIATRIST

## 2024-08-19 VITALS
DIASTOLIC BLOOD PRESSURE: 75 MMHG | TEMPERATURE: 98.5 F | SYSTOLIC BLOOD PRESSURE: 125 MMHG | OXYGEN SATURATION: 99 % | RESPIRATION RATE: 18 BRPM | HEART RATE: 83 BPM

## 2024-08-19 DIAGNOSIS — L97.529 DIABETIC ULCER OF LEFT GREAT TOE (H): Primary | ICD-10-CM

## 2024-08-19 DIAGNOSIS — E11.621 DIABETIC ULCER OF LEFT GREAT TOE (H): Primary | ICD-10-CM

## 2024-08-19 PROCEDURE — 97607 NEG PRS WND THR NDME<=50SQCM: CPT

## 2024-08-19 ASSESSMENT — PAIN SCALES - GENERAL: PAINLEVEL: NO PAIN (0)

## 2024-08-19 NOTE — PROGRESS NOTES
[facility]  -  Nurse Visit              Date of Service:  August 19, 2024     Requesting Provider: SILVIA Braswell    Diagnosis:     ICD-10-CM    1. Diabetic ulcer of left great toe (H)  E11.621 Wound care    L97.529           Chief Complaint: Petar is being seen today at [facility] [place] for his wound(s) dressing change. Reports pain of 0.  Denies any fevers, chills, or generalized ill feeling.     Dressing on Arrival: NPWT, Pt is currently using no compression. Upon removal of dressings scant Serosanguinous drainage is noted.    New Wounds noted: No    Vital Signs: /75   Pulse 83   Temp 98.5  F (36.9  C)   Resp 18   SpO2 99%     Assessment:      General:  Patient presents to clinic in no apparent distress.   Psychiatric:  Alert and oriented x3.   Lower extremity:  edema is present.    Integumentary:  Skin is WNL    Circumferential volume measures:       No data to display                Wound info:  Negative Pressure Wound Therapy Toe (Comment  which one) Anterior;Left (Active)   Wound Type Surgical 08/15/24 0855   Cycle Continuous;On 08/15/24 0855   Target Pressure (mmHg) 125 08/15/24 0855   Cannister changed? No 08/15/24 0000   Output (ml) 0 ml 08/15/24 0000       VASC Wound Left hallux (Active)       VASC Wound L hallux (Active)   Pre Size Length 2 08/19/24 1400   Pre Size Width 1.6 08/19/24 1400   Pre Size Depth 1.4 08/19/24 1400   Pre Total Sq cm 3.2 08/19/24 1400       Incision/Surgical Site 08/11/24 Anterior;Left Toe (Comment  which one) (Active)   Incision Assessment [place] 08/15/24 0000   Dressing Vacuum based 08/15/24 0000   Talia-Incision Assessment [facility] 08/14/24 1700   Closure LORETTA 08/14/24 0045   Incision Drainage Amount None 08/15/24 0000   Drainage Description [facility] 08/14/24 0045   Dressing Intervention Clean, dry, intact 08/14/24 0825       Undermining is not present.    The periwound skin is  callused      Plan:         1. Patient will return in  3 days for consult.            2. As listed below, treatment provided irrigation, mechanical cleansing, and dressings to promote autolytic debridement.             Cleansed with: Normal saline and soap and water    Protected skin with: 3M Cavilon    Dressings Applied to wound: Negative wound vac therapy:      Applied vac drape to the periwound skin to protect from the drainage and sponge; window pane fashion   Cut the black vac foam to fit the size of the wound. Used 1 piece(s) of black sponge total AND 1 bridge. Adapt barrier ring around wound.   Applied vac drape to wherever the bridging is going to be (NEVER apply black sponge to the intact skin)   Covered with vac drape for air tight seal. Cut a hole the size of a quarter and applied suction pad. Be mindful of the direction of the tubing.  Connected tubing and turned vac machine to 125mmHG continuous suction.   No alarm or leaks noted.    Offloading used:  crutches    Trial Products: No    Provider notified regarding concerns: No    Treatment Changes: No    Tolerated Dressing Change:  Yes    Taught Regarding: follow up appointment(s), wound cares, elevation, offloading, compliance, and antibiotics    Educational Barriers: No barriers      Jana Martins RN, CWOCN

## 2024-08-19 NOTE — PATIENT INSTRUCTIONS
*Wheelchairs are never guaranteed at the front door, if you have your own wheel chair or knee walker, please bring that with you to your appointment. Thank you for your understanding!*    Important lnstructions      WEIGHT BEARING STATUS: You are to remain NON WEIGHT BEARING on your left foot. NON WEIGHT BEARING MEANS NO PRESSURE ON YOUR FOOT OR HEEL AT ANY TIME FOR ANY REASON!    2. ELEVATE: Elevating your leg means laying with your head on a pillow and your foot ABOVE YOUR WAIST.     3. DO NOT MOVE YOUR FOOT.  There is a risk of worsening the wound or incision. To give yourself a higher chance of healing, please DO NOT swing foot back and forth and wiggle foot/toes especially when inside a stabilization device.    SEEK MEDICAL CARE IF:  You have an increase in swelling, pain, or redness around the wound.  You have an increase in the amount of pus coming from the wound.  There is a bad smell coming from the wound.  The wound appears to be worsening/enlarging  You have a fever greater than 101.5 F    1. 2x weekly and as needed cleanse the area with normal saline.    2. Pat dry    3. Apply Cavilon No Sting Barrier Wipe to the skin surrounding the wound to protect from drainage/maceration    4. Apply drape around incision. Cut strip of drape and apply to skin if bridging is needed; plan this area in advance; should not be over bony prominence     5. Cut the foam to fit the area of the incision - spiral. Adapt barrier ring as needed to help with seal.    6. Cut narrow strip of foam if bridging    7. Cover foam with drape to obtain air tight seal    8. Cut opening the size of a quarter for where the suction pad will be applied    9. Apply Suction pad    10. -125mmHG suction continuous    11. Change canister at least weekly.    [employer] Contact [facility] can be reached at [phone], 24 hours a day 7 days a week    Back up plan in place:    If the negative pressure wound therapy malfunctions or unable to maintain  "seal: dressing must be removed and reapplied within 2 hours of the incident. If unable to reapply negative pressure wound dressing:  ALTERNATIVE PLAN OF CARE (if unable to continue NPWT)::  Left great toe:  Cleanse with Vashe and ensure skin around wound is dry  Soak Nugauze 1/4\" strip with Vashe and pack gently  Cover with dry gauze and secure with tape  Change daily and as needed if drainage increases    [employer] [facility] can be reached at [phone], 24 hours a day 7 days a week    Information on Vacuum-Assisted Closure of a Wound  Vacuum-assisted closure (VAC) of a wound is a type of treatment to help wounds heal. It s also known as negative pressure wound therapy. During the treatment, a device lowers air pressure on the wound. This can help the wound heal more quickly.  Understanding the wound VAC system  A wound VAC system has several parts. A foam or gauze dressing is put directly on the wound. The dressing is changed every 24 to 72 hours. An adhesive film covers and seals the dressing and wound. A drainage tube leads from under the adhesive film and connects to a portable vacuum pump. This pump removes air pressure over the wound. It may do this constantly. Or it may do it in cycles. During the treatment, you ll need to carry the portable pump everywhere you go.  Why wound VAC is used  You might need this therapy for a recent traumatic wound. Or you may need it for a chronic wound. This is a wound that does not heal the way it should over time. This can happen with wounds in people who have diabetes. You may need a wound VAC if you ve had a recent skin graft. And you may need a wound VAC for a large wound. Large wounds can take a longer time to heal.  A wound vacuum system may help your wound heal more quickly by:  Draining extra fluid from the wound  Reducing swelling  Reducing bacteria in the wound  Keeping your wound moist and warm  Helping draw together wound edges  Increasing blood flow to " your wound  Decreasing inflammation  Wound VAC offers some other advantages over other types of wound care. It may decrease your overall discomfort. The dressings usually need to be changed less often. And they may be easier to keep in position.      We want to hear from you!   In the next few weeks, you should receive a call or email to complete a survey about your visit at [facility]. Please help us improve your appointment experience by letting us know how we did today. We strive to make your experience good and value any ways in which we could do better.      We value your input and suggestions.    Thank you for choosing the [facility]!

## 2024-08-22 ENCOUNTER — OFFICE VISIT (OUTPATIENT)
Dept: VASCULAR SURGERY | Facility: CLINIC | Age: 54
End: 2024-08-22
Attending: PODIATRIST

## 2024-08-22 VITALS
TEMPERATURE: 98.1 F | OXYGEN SATURATION: 97 % | HEART RATE: 77 BPM | RESPIRATION RATE: 16 BRPM | DIASTOLIC BLOOD PRESSURE: 76 MMHG | SYSTOLIC BLOOD PRESSURE: 126 MMHG

## 2024-08-22 DIAGNOSIS — L97.524 DIABETIC ULCER OF TOE OF LEFT FOOT ASSOCIATED WITH TYPE 2 DIABETES MELLITUS, WITH NECROSIS OF BONE (H): Primary | ICD-10-CM

## 2024-08-22 DIAGNOSIS — E11.621 DIABETIC ULCER OF TOE OF LEFT FOOT ASSOCIATED WITH TYPE 2 DIABETES MELLITUS, WITH NECROSIS OF BONE (H): Primary | ICD-10-CM

## 2024-08-22 PROCEDURE — 99215 OFFICE O/P EST HI 40 MIN: CPT | Mod: 25 | Performed by: PODIATRIST

## 2024-08-22 PROCEDURE — 97607 NEG PRS WND THR NDME<=50SQCM: CPT

## 2024-08-22 PROCEDURE — 11044 DBRDMT BONE 1ST 20 SQ CM/<: CPT | Performed by: PODIATRIST

## 2024-08-22 PROCEDURE — 97605 NEG PRS WND THER DME<=50SQCM: CPT | Mod: LT | Performed by: PODIATRIST

## 2024-08-22 ASSESSMENT — PAIN SCALES - GENERAL: PAINLEVEL: NO PAIN (0)

## 2024-08-22 NOTE — PROGRESS NOTES
FOOT AND ANKLE SURGERY/PODIATRY CONSULT NOTE        ASSESSMENT:   Acute osteomyelitis left hallux  Diabetic ulceration left hallux into bone        TREATMENT:  -I discussed with the patient that overall the left hallux ulceration is stable with granulation tissue but does probe to bone.    -I discussed the principles of wound healing today including the importance of nonweightbearing on the involved limb, good vascular perfusion, good glycemic control and the absence of infection.     -Patient's most recent hemoglobin A1c 4 weeks ago was above 13%.  We discussed that this value is not compatible with wound healing and I recommend reducing it below 8%.  I referred the patient to diabetic educator for consultation.    -Recommend continue nonweightbearing with crutches and knee walker which he has in his possession.  Referred for cam boot for stability.    -Antibiotics per ID    -After discussion of risk factors, nursing staff removed dressing, cleansed wound and consent obtained 2% Lidocaine HCL jelly was applied, under clean conditions, the left hallux ulceration(s) were debrided using currette.  Devitalized and nonviable tissue, along with any fibrin and slough, was removed to improve granulation tissue formation, stimulate wound healing, decrease overall bacteria load, disrupt biofilm formation and decrease edge senescence. Wound drainage was scant No. Total excisional debridement was 3.2 sq cm into the bone with a depth of 1 cm.   Ulcers were improved afterwards and .  Measures were as noted on the flow sheet.  Wound VAC applied to the left hallux today.    -He will follow-up in 2 weeks    Abdirizak Braswell DPM  [facility] [place]      HPI: Petar Fermin was seen today at the request of Dr. Waters for a left hallux ulceration.  Patient underwent I&D of the left hallux with Dr. Waters on 8/11 after MRI confirmed osteomyelitis of the distal phalanx of the hallux.  He is currently on  antibiotics per ID.  He is also currently using a wound VAC as directed.    Past Medical History:   Diagnosis Date    DM2 (diabetes mellitus, type 2) (H)        Past Surgical History:   Procedure Laterality Date    IRRIGATION AND DEBRIDEMENT FOOT, COMBINED Left 8/11/2024    Procedure: IRRIGATION AND DEBRIDEMENT, LEFT FOOT;  Surgeon: Prabhakar Waters DPM;  Location: [facility] OR        No Known Allergies      Current Outpatient Medications:     amoxicillin-clavulanate (AUGMENTIN) 875-125 MG tablet, Take 1 tablet by mouth every 12 hours, Disp: 56 tablet, Rfl: 0    glipiZIDE (GLUCOTROL XL) 10 MG 24 hr tablet, Take 1 tablet (10 mg) by mouth daily, Disp: 30 tablet, Rfl: 2    ibuprofen (ADVIL/MOTRIN) 200 MG tablet, Take 400 mg by mouth every 6 hours as needed for pain, Disp: , Rfl:     metFORMIN (GLUCOPHAGE XR) 500 MG 24 hr tablet, Take 2,000 mg by mouth daily (with dinner), Disp: , Rfl:     sulfamethoxazole-trimethoprim (BACTRIM DS) 800-160 MG tablet, Take 1 tablet by mouth 3 times daily, Disp: 84 tablet, Rfl: 0    Social History     Social History Narrative    Not on file       No family history on file.    Review of Systems - 10 point Review of Systems is negative except for left hallux ulcer which is noted in HPI.      OBJECTIVE:  Appearance: alert, well appearing, and in no distress.    /76   Pulse 77   Temp 98.1  F (36.7  C)   Resp 16   SpO2 97%     BMI= There is no height or weight on file to calculate BMI.    General appearance: Patient is alert and fully cooperative with history & exam.  No sign of distress is noted during the visit.     Psychiatric: Affect is pleasant & appropriate.  Patient appears motivated to improve health.     Respiratory: Breathing is regular & unlabored while sitting.     HEENT: Hearing is intact to spoken word.  Speech is clear.  No gross evidence of visual impairment that would impact ambulation.    Vascular: Dorsalis pedis palpableLeft.  Dermatologic:   Negative Pressure  Wound Therapy Toe (Comment  which one) Anterior;Left (Active)   Wound Type Surgical 08/15/24 0855   Cycle Continuous;On 08/15/24 0855   Target Pressure (mmHg) 125 08/15/24 0855   Cannister changed? No 08/15/24 0000   Output (ml) 0 ml 08/15/24 0000       VASC Wound Left hallux (Active)       VASC Wound L hallux (Active)   Pre Size Length 2 08/22/24 1200   Pre Size Width 1.6 08/22/24 1200   Pre Size Depth 1 08/22/24 1200   Pre Total Sq cm 3.2 08/22/24 1200       Incision/Surgical Site 08/11/24 Anterior;Left Toe (Comment  which one) (Active)   Incision Assessment UT 08/15/24 0000   Dressing Vacuum based 08/15/24 0000   Talia-Incision Assessment [facility] 08/14/24 1700   Closure LORETTA 08/14/24 0045   Incision Drainage Amount None 08/15/24 0000   Drainage Description UT 08/14/24 0045   Dressing Intervention Clean, dry, intact 08/14/24 0825   Ulceration distal left hallux has granular tissue with increased depth which probes to bone, no erythema.  Neurologic: Diminished to light touch Left.  Musculoskeletal: Contracted digits noted Left.

## 2024-08-22 NOTE — PATIENT INSTRUCTIONS
*Wheelchairs are never guaranteed at the front door, if you have your own wheel chair or knee walker, please bring that with you to your appointment. Thank you for your understanding!*    Wound care supplies were not ordered or needed today.    Important lnstructions      WEIGHT BEARING STATUS: You are to remain NON WEIGHT BEARING on your left foot. NON WEIGHT BEARING MEANS NO PRESSURE ON YOUR FOOT OR HEEL AT ANY TIME FOR ANY REASON!    2. OFFLOADING DEVICE: Must use a A ROLLING KNEE WALKER at all times! (do not use affected foot to push wheelchair)    3. STABILIZATION DEVICE: Use a PRAFO BOOT . You will need to WEAR THIS AT ALL TIMES EVEN WHILE IN BED.     4. ELEVATE: Elevating your leg means laying with your head on a pillow and your foot ABOVE YOUR HEART.     5. DO NOT MOVE YOUR FOOT.  There is a risk of worsening the wound or incision. To give yourself a higher chance of healing, please DO NOT swing foot back and forth and wiggle foot/toes especially when inside a stabilization device. Limited movement is allowable with therapy as recommended by the doctor.     6. TAKE A PROTEIN SHAKE TWICE A DAY.  (For ex: Boost, Ensure, Glucerna)    7. KEEP YOUR WOUND DRY AT ALL TIMES    Use a shower bag or a cast cover to keep your foot/leg dry during showers. These can be purchased on [employer] or any pharmacy.         Dressing Change lnstructions    Standard - Wound Vac Instructions    1. 2x weekly and as needed cleanse the area with NS    2. Pat dry    3. Apply Cavilon no sting barrier wipe to the skin surrounding the wound to protect from drainage/maceration    4. Apply drape around incision. Cut strip of drape and apply to skin if bridging is needed; plan this area in advance; should not be over bony prominence     5. Cut the foam to fit the area of the incision    6. Cut narrow strip of foam if bridging    7. Cover foam with drape to obtain air tight seal    8. Cut opening the size of a quarter for where the suction pad  will be applied    9. Apply Suction pad    10. 125mmHG suction continuous    [employer] can be reached at [phone], 24 hours a day 7 days a week     - Back up plan in place:     If the negative pressure wound therapy malfunctions or unable to maintain seal: dressing must be removed and reapplied within 2 hours of the incident. If unable to reapply negative pressure wound dressing, place Normal Saline moistened gauze in wound bed and cover with appropriate dressing to keep wound bed moist.  Change wet-to-moist dressing two times a day until healthcare staff can re-implement negative pressure therapy. Change canister at least weekly.  [employer] can be reached at [phone], 24 hours a day 7 days a week    Information on Vacuum-Assisted Closure of a Wound  Vacuum-assisted closure (VAC) of a wound is a type of treatment to help wounds heal. It s also known as negative pressure wound therapy. During the treatment, a device lowers air pressure on the wound. This can help the wound heal more quickly.  Understanding the wound VAC system  A wound VAC system has several parts. A foam or gauze dressing is put directly on the wound. The dressing is changed every 24 to 72 hours. An adhesive film covers and seals the dressing and wound. A drainage tube leads from under the adhesive film and connects to a portable vacuum pump. This pump removes air pressure over the wound. It may do this constantly. Or it may do it in cycles. During the treatment, you ll need to carry the portable pump everywhere you go.  Why wound VAC is used  You might need this therapy for a recent traumatic wound. Or you may need it for a chronic wound. This is a wound that does not heal the way it should over time. This can happen with wounds in people who have diabetes. You may need a wound VAC if you ve had a recent skin graft. And you may need a wound VAC for a large wound. Large wounds can take a longer time to heal.  A wound  vacuum system may help your wound heal more quickly by:  Draining extra fluid from the wound  Reducing swelling  Reducing bacteria in the wound  Keeping your wound moist and warm  Helping draw together wound edges  Increasing blood flow to your wound  Decreasing inflammation  Wound VAC offers some other advantages over other types of wound care. It may decrease your overall discomfort. The dressings usually need to be changed less often. And they may be easier to keep in position.             SEEK MEDICAL CARE IF:  You have an increase in swelling, pain, or redness around the wound.  You have an increase in the amount of pus coming from the wound.  There is a bad smell coming from the wound.  The wound appears to be worsening/enlarging  You have a fever greater than 101.5 F      It is ok to continue current wound care treatment/products for the next 2-3 days until new wound care supplies are ordered and arrive. If longer than this please contact our office at [phone].      We want to hear from you!   In the next few weeks, you should receive a call or email to complete a survey about your visit at [facility]. Please help us improve your appointment experience by letting us know how we did today. We strive to make your experience good and value any ways in which we could do better.      We value your input and suggestions.    Thank you for choosing the [facility]!

## 2024-08-22 NOTE — PROGRESS NOTES
Nurse visit for VAC application- left toe          Cleansed with: Normal saline    Protected skin with: 3M Cavilon    Dressings Applied to wound: Negative wound vac therapy:      Applied vac drape to the periwound skin to protect from the drainage and sponge; window pane fashion   Cut the black vac foam to fit the size of the wound. Used 1 piece(s) of black sponge total, adapt barrier ring around wound.   Applied vac drape to wherever the bridging is going to be (NEVER apply black sponge to the intact skin)   Covered with vac drape for air tight seal. Cut a hole the size of a quarter and applied suction pad. Be mindful of the direction of the tubing.  Connected tubing and turned vac machine to 125mmHG continuous suction.   No alarm or leaks noted.    CAM boot applied taking care to pad around VAC suction pad using abd pad, pt shown how to do- re-use pad and gave 2 more to use as needed, also advised as to taking care that VAC tubing not compressed to skin under Cam boot    Compression Applied to the left leg: None      Jaky Crump RN, CWOCN

## 2024-08-26 ENCOUNTER — OFFICE VISIT (OUTPATIENT)
Dept: VASCULAR SURGERY | Facility: CLINIC | Age: 54
End: 2024-08-26
Attending: PODIATRIST

## 2024-08-26 VITALS
SYSTOLIC BLOOD PRESSURE: 128 MMHG | RESPIRATION RATE: 12 BRPM | TEMPERATURE: 98.1 F | HEART RATE: 72 BPM | DIASTOLIC BLOOD PRESSURE: 60 MMHG

## 2024-08-26 DIAGNOSIS — L97.524 DIABETIC ULCER OF TOE OF LEFT FOOT ASSOCIATED WITH TYPE 2 DIABETES MELLITUS, WITH NECROSIS OF BONE (H): Primary | ICD-10-CM

## 2024-08-26 DIAGNOSIS — E11.621 DIABETIC ULCER OF TOE OF LEFT FOOT ASSOCIATED WITH TYPE 2 DIABETES MELLITUS, WITH NECROSIS OF BONE (H): Primary | ICD-10-CM

## 2024-08-26 PROCEDURE — 97607 NEG PRS WND THR NDME<=50SQCM: CPT

## 2024-08-26 ASSESSMENT — PAIN SCALES - GENERAL: PAINLEVEL: NO PAIN (0)

## 2024-08-26 NOTE — PATIENT INSTRUCTIONS
*Wheelchairs are never guaranteed at the front door, if you have your own wheel chair or knee walker, please bring that with you to your appointment. Thank you for your understanding!*    Wound care supplies were not ordered or needed today.    Important lnstructions      WEIGHT BEARING STATUS: You are to remain NON WEIGHT BEARING on your left foot. NON WEIGHT BEARING MEANS NO PRESSURE ON YOUR FOOT OR HEEL AT ANY TIME FOR ANY REASON!    2. OFFLOADING DEVICE: Must use a A ROLLING KNEE WALKER at all times! (do not use affected foot to push wheelchair)    3. STABILIZATION DEVICE: Use a PRAFO BOOT . You will need to WEAR THIS AT ALL TIMES EVEN WHILE IN BED.     4. ELEVATE: Elevating your leg means laying with your head on a pillow and your foot ABOVE YOUR HEART.     5. DO NOT MOVE YOUR FOOT.  There is a risk of worsening the wound or incision. To give yourself a higher chance of healing, please DO NOT swing foot back and forth and wiggle foot/toes especially when inside a stabilization device. Limited movement is allowable with therapy as recommended by the doctor.     6. TAKE A PROTEIN SHAKE TWICE A DAY.  (For ex: Boost, Ensure, Glucerna)    7. KEEP YOUR WOUND DRY AT ALL TIMES    Use a shower bag or a cast cover to keep your foot/leg dry during showers. These can be purchased on [email or web address] or any pharmacy.         Dressing Change lnstructions    Standard - Wound Vac Instructions    1. 2x weekly and as needed cleanse the area with NS    2. Pat dry    3. Apply Cavilon no sting barrier wipe to the skin surrounding the wound to protect from drainage/maceration    4. Apply drape around incision. Cut strip of drape and apply to skin if bridging is needed; plan this area in advance; should not be over bony prominence     5. Cut the foam to fit the area of the incision    6. Cut narrow strip of foam if bridging    7. Cover foam with drape to obtain air tight seal    8. Cut opening the size of a quarter for where the suction pad  will be applied    9. Apply Suction pad    10. 125mmHG suction continuous    [employer] can be reached at [phone], 24 hours a day 7 days a week     - Back up plan in place:     If the negative pressure wound therapy malfunctions or unable to maintain seal: dressing must be removed and reapplied within 2 hours of the incident. If unable to reapply negative pressure wound dressing, place Normal Saline moistened gauze in wound bed and cover with appropriate dressing to keep wound bed moist.  Change wet-to-moist dressing two times a day until healthcare staff can re-implement negative pressure therapy. Change canister at least weekly.  [employer] can be reached at [phone], 24 hours a day 7 days a week    Information on Vacuum-Assisted Closure of a Wound  Vacuum-assisted closure (VAC) of a wound is a type of treatment to help wounds heal. It s also known as negative pressure wound therapy. During the treatment, a device lowers air pressure on the wound. This can help the wound heal more quickly.  Understanding the wound VAC system  A wound VAC system has several parts. A foam or gauze dressing is put directly on the wound. The dressing is changed every 24 to 72 hours. An adhesive film covers and seals the dressing and wound. A drainage tube leads from under the adhesive film and connects to a portable vacuum pump. This pump removes air pressure over the wound. It may do this constantly. Or it may do it in cycles. During the treatment, you ll need to carry the portable pump everywhere you go.  Why wound VAC is used  You might need this therapy for a recent traumatic wound. Or you may need it for a chronic wound. This is a wound that does not heal the way it should over time. This can happen with wounds in people who have diabetes. You may need a wound VAC if you ve had a recent skin graft. And you may need a wound VAC for a large wound. Large wounds can take a longer time to heal.  A wound  vacuum system may help your wound heal more quickly by:  Draining extra fluid from the wound  Reducing swelling  Reducing bacteria in the wound  Keeping your wound moist and warm  Helping draw together wound edges  Increasing blood flow to your wound  Decreasing inflammation  Wound VAC offers some other advantages over other types of wound care. It may decrease your overall discomfort. The dressings usually need to be changed less often. And they may be easier to keep in position.             SEEK MEDICAL CARE IF:  You have an increase in swelling, pain, or redness around the wound.  You have an increase in the amount of pus coming from the wound.  There is a bad smell coming from the wound.  The wound appears to be worsening/enlarging  You have a fever greater than 101.5 F      It is ok to continue current wound care treatment/products for the next 2-3 days until new wound care supplies are ordered and arrive. If longer than this please contact our office at [phone].      We want to hear from you!   In the next few weeks, you should receive a call or email to complete a survey about your visit at [facility]. Please help us improve your appointment experience by letting us know how we did today. We strive to make your experience good and value any ways in which we could do better.      We value your input and suggestions.    Thank you for choosing the [facility]!

## 2024-08-26 NOTE — PROGRESS NOTES
[facility]  -  Nurse Visit        Date of Service:  August 26, 2024     Requesting Provider: SILVIA Braswell    Diagnosis:     ICD-10-CM    1. Diabetic ulcer of toe of left foot associated with type 2 diabetes mellitus, with necrosis of bone (H)  E11.621     L97.524           Chief Complaint: Petar is being seen today at [facility] [place] for his wound(s) dressing change. Reports pain of 1/10.  Denies any fevers, chills, or generalized ill feeling. Arrives offloading with crutches. Does not use Cam boot because foot gets too warm and patient says his foot swells. Patient continues to work as a . Reminded patient not to move or put pressure on left foot. Explained to elevateabove his heart.    Dressing on Arrival: Wound VAC running at 125mmhgs, Denies alarms or leaks. Callous continues.Upon removal of dressings moderate Serosanguinous drainage is noted.    New Wounds noted: No    Vital Signs: /60   Pulse 72   Temp 98.1  F (36.7  C)   Resp 12     Assessment:      General:  Patient presents to clinic in no apparent distress.   Psychiatric:  Alert and oriented x3.   Lower extremity:  edema is not present.    Integumentary:  Skin is macerated.     Circumferential volume measures:       No data to display                Wound info:  Negative Pressure Wound Therapy Toe (Comment  which one) Anterior;Left (Active)   Wound Type Surgical 08/15/24 0855   Cycle Continuous;On 08/15/24 0855   Target Pressure (mmHg) 125 08/15/24 0855   Cannister changed? No 08/15/24 0000   Output (ml) 0 ml 08/15/24 0000       VASC Wound Left hallux (Active)       VASC Wound L hallux (Active)   Pre Size Length 2 08/26/24 1500   Pre Size Width 1 08/26/24 1500   Pre Size Depth 0.8 08/26/24 1500   Pre Total Sq cm 2 08/26/24 1500   Product Used Negative pressure 08/26/24 1500       Incision/Surgical Site 08/11/24 Anterior;Left Toe (Comment  which one) (Active)   Incision Assessment  [facility] 08/15/24 0000   Dressing Vacuum based 08/15/24 0000   Vannessa-Incision Assessment [facility] 08/14/24 1700   Closure LORETTA 08/14/24 0045   Incision Drainage Amount None 08/15/24 0000   Drainage Description [facility] 08/14/24 0045   Dressing Intervention Clean, dry, intact 08/14/24 0825       Undermining is not present.    The periwoundskin is maceration stoma powder applied      Plan:         1. Patient will return in 3 days for nurse visit.            2. As listed below, treatment provided irrigation, mechanical cleansing, and dressings to promote autolytic debridement.             Cleansed with: Dilute hibiclens 30cc in 500cc NS and soap and water    Protected skin with: 3M Cavilon    Dressings Applied to wound: Negative wound vac therapy: wax ring to vannessa wound.     Applied vac drape to the periwound skin to protect from the drainage and sponge; window pane fashion   Cut the black vac foam to fit the size of the wound. Used 1 piece(s) of black sponge total.   Applied vac drape to wherever the bridging is going to be (NEVER apply black sponge to the intact skin)   Covered with vac drape for air tight seal. Cut a hole the size of a quarter and applied suction pad. Be mindful of the direction of the tubing.  Connected tubing and turned vac machine to 125mmHG continuous suction.   No alarm or leaks noted.    Compression Applied to the right leg: None  Compression Applied to the left leg: None        Offloading used:  crutches    Trial Products: No    Provider notified regarding concerns: No    Treatment Changes: No    Tolerated Dressing Change:  Yes    Taught Regarding: follow up appointment(s), wound cares, elevation, offloading, and compliance    Educational Barriers: No barriers      Rad Burnette RN

## 2024-08-29 ENCOUNTER — OFFICE VISIT (OUTPATIENT)
Dept: VASCULAR SURGERY | Facility: CLINIC | Age: 54
End: 2024-08-29
Attending: PODIATRIST

## 2024-08-29 VITALS — TEMPERATURE: 98 F | SYSTOLIC BLOOD PRESSURE: 122 MMHG | DIASTOLIC BLOOD PRESSURE: 74 MMHG | HEART RATE: 91 BPM

## 2024-08-29 DIAGNOSIS — L97.524 DIABETIC ULCER OF TOE OF LEFT FOOT ASSOCIATED WITH TYPE 2 DIABETES MELLITUS, WITH NECROSIS OF BONE (H): Primary | ICD-10-CM

## 2024-08-29 DIAGNOSIS — L97.529 DIABETIC ULCER OF LEFT GREAT TOE (H): ICD-10-CM

## 2024-08-29 DIAGNOSIS — E11.621 DIABETIC ULCER OF LEFT GREAT TOE (H): ICD-10-CM

## 2024-08-29 DIAGNOSIS — E11.621 DIABETIC ULCER OF TOE OF LEFT FOOT ASSOCIATED WITH TYPE 2 DIABETES MELLITUS, WITH NECROSIS OF BONE (H): Primary | ICD-10-CM

## 2024-08-29 PROCEDURE — 97607 NEG PRS WND THR NDME<=50SQCM: CPT

## 2024-08-29 NOTE — PROGRESS NOTES
[facility]  -  Nurse Visit        Date of Service:  August 29, 2024     Requesting Provider: SILVIA Braswell    Diagnosis:     ICD-10-CM    1. Diabetic ulcer of toe of left foot associated with type 2 diabetes mellitus, with necrosis of bone (H)  E11.621     L97.524       2. Diabetic ulcer of left great toe (H)  E11.621     L97.529           Chief Complaint: Petar is being seen today at [facility] [place] for his wound(s) dressing change. Reports pain of 0.  Denies any fevers, chills, or generalized ill feeling.     Dressing on Arrival: Wound VAC in place, Pt is currently using no compression. Upon removal of dressings slight Serous drainage is noted in tubing only.    New Wounds noted: No    Vital Signs: /74   Pulse 91   Temp 98  F (36.7  C)     Assessment:      General:  Patient presents to clinic in no apparent distress.   Psychiatric:  Alert and oriented x3.   Lower extremity:  edema is not present.    Integumentary:  Skin is pale around wound    Circumferential volume measures:       No data to display                Wound info:  Negative Pressure Wound Therapy Toe (Comment  which one) Anterior;Left (Active)   Wound Type Surgical 08/15/24 0855   Cycle Continuous;On 08/15/24 0855   Target Pressure (mmHg) 125 08/15/24 0855   Cannister changed? No 08/15/24 0000   Output (ml) 0 ml 08/15/24 0000       VASC Wound Left hallux (Active)       VASC Wound L hallux (Active)   Pre Size Length 2 08/29/24 1000   Pre Size Width 1.2 08/29/24 1000   Pre Size Depth 1.4 08/29/24 1000   Pre Total Sq cm 2 08/26/24 1500   Product Used Negative pressure 08/26/24 1500       Incision/Surgical Site 08/11/24 Anterior;Left Toe (Comment  which one) (Active)   Incision Assessment UTV 08/15/24 0000   Dressing Vacuum based 08/15/24 0000   Talia-Incision Assessment UT 08/14/24 1700   Closure LORETTA 08/14/24 0045   Incision Drainage Amount None 08/15/24 0000   Drainage Description UT 08/14/24  "0045   Dressing Intervention Clean, dry, intact 08/14/24 0825       Undermining is not present.    The periwoundskin is maceration      Plan:         1. Patient will return in 5 days for nurse visit.            2. As listed below, treatment provided irrigation, mechanical cleansing, and dressings to promote autolytic debridement.             Cleansed with: Normal saline    Protected skin with: 3M Cavilon    Dressings Applied to wound: Negative wound vac therapy:      Applied vac drape to the periwound skin to protect from the drainage and sponge; window pane fashion   Cut the black vac foam to fit the size of the wound. Used 1 piece(s) of black sponge total.   Applied vac drape to wherever the bridging is going to be (NEVER apply black sponge to the intact skin)   Covered with vac drape for air tight seal. Cut a hole the size of a quarter and applied suction pad. Be mindful of the direction of the tubing.  Connected tubing and turned vac machine to 125mmHG continuous suction.   No alarm or leaks noted.    Compression Applied to the right leg: None  Compression Applied to the left leg: None        Offloading used: Rolling knee walker- he is refusing to wear CAM boot \"too heavy and too hot\", states he is staying off foot but noting that bottom of foot (won't wear sock,) is soiled    Trial Products: No    Provider notified regarding concerns: No    Treatment Changes: No    Tolerated Dressing Change:  Yes    Taught Regarding: follow up appointment(s), wound cares, offloading, and advised trying to wear CAM boot without top cover so less hot    Educational Barriers: No barriers      Jaky Crump RN, CWOCN    "

## 2024-08-29 NOTE — PATIENT INSTRUCTIONS
*Wheelchairs are never guaranteed at the front door, if you have your own wheel chair or knee walker, please bring that with you to your appointment. Thank you for your understanding!*    Wound care supplies were not ordered or needed today.    Important lnstructions      WEIGHT BEARING STATUS: You are to remain NON WEIGHT BEARING on your left foot. NON WEIGHT BEARING MEANS NO PRESSURE ON YOUR FOOT OR HEEL AT ANY TIME FOR ANY REASON!    2. OFFLOADING DEVICE: Must use a A ROLLING KNEE WALKER at all times! (do not use affected foot to push wheelchair)    3. STABILIZATION DEVICE: Use a PRAFO BOOT . You will need to WEAR THIS AT ALL TIMES EVEN WHILE IN BED.     4. ELEVATE: Elevating your leg means laying with your head on a pillow and your foot ABOVE YOUR HEART.     5. DO NOT MOVE YOUR FOOT.  There is a risk of worsening the wound or incision. To give yourself a higher chance of healing, please DO NOT swing foot back and forth and wiggle foot/toes especially when inside a stabilization device. Limited movement is allowable with therapy as recommended by the doctor.     6. TAKE A PROTEIN SHAKE TWICE A DAY.  (For ex: Boost, Ensure, Glucerna)    7. KEEP YOUR WOUND DRY AT ALL TIMES    Use a shower bag or a cast cover to keep your foot/leg dry during showers. These can be purchased on [employer] or any pharmacy.         Dressing Change lnstructions    Standard - Wound Vac Instructions    1. 2x weekly and as needed cleanse the area with NS    2. Pat dry    3. Apply Cavilon no sting barrier wipe to the skin surrounding the wound to protect from drainage/maceration    4. Apply drape around incision. Cut strip of drape and apply to skin if bridging is needed; plan this area in advance; should not be over bony prominence     5. Cut the foam to fit the area of the incision    6. Cut narrow strip of foam if bridging    7. Cover foam with drape to obtain air tight seal    8. Cut opening the size of a quarter for where the suction pad  will be applied    9. Apply Suction pad    10. 125mmHG suction continuous    [employer] [facility] can be reached at [phone], 24 hours a day 7 days a week     - Back up plan in place:     If the negative pressure wound therapy malfunctions or unable to maintain seal: dressing must be removed and reapplied within 2 hours of the incident. If unable to reapply negative pressure wound dressing, place Normal Saline moistened gauze in wound bed and cover with appropriate dressing to keep wound bed moist.  Change wet-to-moist dressing two times a day until healthcare staff can re-implement negative pressure therapy. Change canister at least weekly.  [employer] Contact [facility] can be reached at [phone], 24 hours a day 7 days a week    Information on Vacuum-Assisted Closure of a Wound  Vacuum-assisted closure (VAC) of a wound is a type of treatment to help wounds heal. It s also known as negative pressure wound therapy. During the treatment, a device lowers air pressure on the wound. This can help the wound heal more quickly.  Understanding the wound VAC system  A wound VAC system has several parts. A foam or gauze dressing is put directly on the wound. The dressing is changed every 24 to 72 hours. An adhesive film covers and seals the dressing and wound. A drainage tube leads from under the adhesive film and connects to a portable vacuum pump. This pump removes air pressure over the wound. It may do this constantly. Or it may do it in cycles. During the treatment, you ll need to carry the portable pump everywhere you go.  Why wound VAC is used  You might need this therapy for a recent traumatic wound. Or you may need it for a chronic wound. This is a wound that does not heal the way it should over time. This can happen with wounds in people who have diabetes. You may need a wound VAC if you ve had a recent skin graft. And you may need a wound VAC for a large wound. Large wounds can take a longer time to heal.  A wound  vacuum system may help your wound heal more quickly by:  Draining extra fluid from the wound  Reducing swelling  Reducing bacteria in the wound  Keeping your wound moist and warm  Helping draw together wound edges  Increasing blood flow to your wound  Decreasing inflammation  Wound VAC offers some other advantages over other types of wound care. It may decrease your overall discomfort. The dressings usually need to be changed less often. And they may be easier to keep in position.             SEEK MEDICAL CARE IF:  You have an increase in swelling, pain, or redness around the wound.  You have an increase in the amount of pus coming from the wound.  There is a bad smell coming from the wound.  The wound appears to be worsening/enlarging  You have a fever greater than 101.5 F      It is ok to continue current wound care treatment/products for the next 2-3 days until new wound care supplies are ordered and arrive. If longer than this please contact our office at [phone].      We want to hear from you!   In the next few weeks, you should receive a call or email to complete a survey about your visit at [facility]. Please help us improve your appointment experience by letting us know how we did today. We strive to make your experience good and value any ways in which we could do better.      We value your input and suggestions.    Thank you for choosing the [facility]!

## 2024-09-03 ENCOUNTER — OFFICE VISIT (OUTPATIENT)
Dept: VASCULAR SURGERY | Facility: CLINIC | Age: 54
End: 2024-09-03
Attending: PODIATRIST

## 2024-09-03 VITALS
RESPIRATION RATE: 18 BRPM | DIASTOLIC BLOOD PRESSURE: 69 MMHG | TEMPERATURE: 98 F | SYSTOLIC BLOOD PRESSURE: 109 MMHG | HEART RATE: 87 BPM

## 2024-09-03 DIAGNOSIS — L97.524 DIABETIC ULCER OF TOE OF LEFT FOOT ASSOCIATED WITH TYPE 2 DIABETES MELLITUS, WITH NECROSIS OF BONE (H): Primary | ICD-10-CM

## 2024-09-03 DIAGNOSIS — E11.621 DIABETIC ULCER OF TOE OF LEFT FOOT ASSOCIATED WITH TYPE 2 DIABETES MELLITUS, WITH NECROSIS OF BONE (H): Primary | ICD-10-CM

## 2024-09-03 PROCEDURE — 97607 NEG PRS WND THR NDME<=50SQCM: CPT

## 2024-09-03 NOTE — PROGRESS NOTES
[facility]  -  Nurse Visit        Date of Service:  September 3, 2024     Requesting Provider: SILVIA Braswell    Diagnosis:     ICD-10-CM    1. Diabetic ulcer of toe of left foot associated with type 2 diabetes mellitus, with necrosis of bone (H)  E11.621     L97.524           Chief Complaint: Petar is being seen today at [facility] [place] for his wound(s) dressing change. Reports pain of 0/10.  Denies any fevers, chills, or generalized ill feeling.     Dressing on Arrival: NPWT, Pt is currently using no compression. Upon removal of dressings light Serosanguinous drainage is noted.    New Wounds noted: No    Vital Signs: /69   Pulse 87   Temp 98  F (36.7  C)   Resp 18     Assessment:      General:  Patient presents to clinic in no apparent distress.   Psychiatric:  Alert and oriented x3.   Lower extremity:  edema is not present.    Integumentary:  Skin is WNL    Circumferential volume measures:       No data to display                Wound info:  Negative Pressure Wound Therapy Toe (Comment  which one) Anterior;Left (Active)   Wound Type Surgical 08/15/24 0855   Cycle Continuous;On 08/15/24 0855   Target Pressure (mmHg) 125 08/15/24 0855   Cannister changed? No 08/15/24 0000   Output (ml) 0 ml 08/15/24 0000       VASC Wound Left hallux (Active)       VASC Wound L hallux (Active)   Pre Size Length 1.8 09/03/24 1200   Pre Size Width 1 09/03/24 1200   Pre Size Depth 0.8 09/03/24 1200   Pre Total Sq cm 1.8 09/03/24 1200   Product Used Negative pressure 08/26/24 1500       Incision/Surgical Site 08/11/24 Anterior;Left Toe (Comment  which one) (Active)   Incision Assessment UTV 08/15/24 0000   Dressing Vacuum based 08/15/24 0000   Talia-Incision Assessment UTV 08/14/24 1700   Closure LORETTA 08/14/24 0045   Incision Drainage Amount None 08/15/24 0000   Drainage Description UTV 08/14/24 0045   Dressing Intervention Clean, dry, intact 08/14/24 0825       Undermining is  not present.    The periwoundskin is maceration      Plan:         1. Patient will return in 3 days for nurse visit.            2. As listed below, treatment provided irrigation, mechanical cleansing, and dressings to promote autolytic debridement.             Cleansed with: Normal saline and soap and water    Protected skin with: 3M Cavilon and barrier ring    Dressings Applied to wound: Negative wound vac therapy:      Applied vac drape to the periwound skin to protect from the drainage and sponge; window pane fashion   Cut the black vac foam to fit the size of the wound. Used 1 piece(s) of black sponge total.   Applied vac drape to wherever the bridging is going to be (NEVER apply black sponge to the intact skin)   Covered with vac drape for air tight seal. Cut a hole the size of a quarter and applied suction pad. Be mindful of the direction of the tubing.  Connected tubing and turned vac machine to 125mmHG continuous suction.   No alarm or leaks noted.    Compression Applied to the right leg: None  Compression Applied to the left leg: None    Offloading used:  crutches    Trial Products: No    Provider notified regarding concerns: No    Treatment Changes: No    Tolerated Dressing Change:  Yes    Taught Regarding: follow up appointment(s), wound cares, offloading, and compliance    Educational Barriers: No barriers      NELLI SALOMON LPN

## 2024-09-05 NOTE — PROGRESS NOTES
[facility]  -  Nurse Visit        Date of Service:  September 6, 2024     Requesting Provider: SILVIA Braswell    Diagnosis:     ICD-10-CM    1. Diabetic ulcer of toe of left foot associated with type 2 diabetes mellitus, with necrosis of bone (H)  E11.621     L97.524       2. Diabetic ulcer of left great toe (H)  E11.621     L97.529           Chief Complaint: Petar is being seen today at [facility] [place] for his wound(s) dressing change. Reports pain of 0 out of 10.  Denies any fevers, chills, or generalized ill feeling.     Dressing on Arrival: NPWT to left hallux. Pt is currently not using compression. Upon removal of dressings scant serosanguinous drainage is noted.    New Wounds noted: No    Vital Signs: /84   Pulse 78   Temp 97  F (36.1  C)   SpO2 99%     Assessment:      General:  Patient presents to clinic in no apparent distress.   Psychiatric:  Alert and oriented x3.   Lower extremity:  edema is not present.    Integumentary:  Skin is WNL    Circumferential volume measures:       No data to display              Wound info:  Negative Pressure Wound Therapy Toe (Comment  which one) Anterior;Left (Active)   Wound Type Surgical 08/15/24 0855   Cycle Continuous;On 08/15/24 0855   Target Pressure (mmHg) 125 08/15/24 0855   Cannister changed? No 08/15/24 0000   Output (ml) 0 ml 08/15/24 0000       VASC Wound Left hallux (Active)       VASC Wound L hallux (Active)   Pre Size Length 1.8 09/06/24 1100   Pre Size Width 1 09/06/24 1100   Pre Size Depth 0.7 09/06/24 1100   Pre Total Sq cm 1.8 09/06/24 1100   Product Used Negative pressure 08/26/24 1500       Incision/Surgical Site 08/11/24 Anterior;Left Toe (Comment  which one) (Active)   Incision Assessment UTV 08/15/24 0000   Dressing Vacuum based 08/15/24 0000   Talia-Incision Assessment UTV 08/14/24 1700   Closure LORETTA 08/14/24 0045   Incision Drainage Amount None 08/15/24 0000   Drainage Description UTV  08/14/24 0045   Dressing Intervention Clean, dry, intact 08/14/24 0825       Undermining is not present.    The periwoundskin is  macerated      Plan:         1. Patient will return in 3 days for nurse visit.            2. As listed below, treatment provided irrigation, mechanical cleansing, and dressings to promote autolytic debridement.             Cleansed with: Normal saline    Protected skin with: 3M Cavilon    Dressings Applied to wound: Negative wound vac therapy:      Applied vac drape to the periwound skin to protect from the drainage and sponge; window pane fashion   Cut the black vac foam to fit the size of the wound. Used 1 piece(s) of black sponge total.   Applied vac drape to wherever the bridging is going to be (NEVER apply black sponge to the intact skin)   Covered with vac drape for air tight seal. Cut a hole the size of a quarter and applied suction pad. Be mindful of the direction of the tubing.  Connected tubing and turned vac machine to 125mmHG continuous suction.   No alarm or leaks noted.    Compression Applied to the right leg: None  Compression Applied to the left leg: None      Offloading used:  crutches    Trial Products: No    Provider notified regarding concerns: No    Treatment Changes: No    Tolerated Dressing Change:  Yes    Taught Regarding: follow up appointment(s), elevation, offloading, compliance, and signs of infection    Educational Barriers: No barriers      AMBERLY IRENE RN

## 2024-09-05 NOTE — PATIENT INSTRUCTIONS
Important lnstructions       1. WEIGHT BEARING STATUS: You are to remain NON WEIGHT BEARING on your left foot. NON WEIGHT BEARING MEANS NO PRESSURE ON YOUR FOOT OR HEEL AT ANY TIME FOR ANY REASON!     2. OFFLOADING DEVICE: Must use a A ROLLING KNEE WALKER at all times! (do not use affected foot to push wheelchair)     3. STABILIZATION DEVICE: Use a PRAFO BOOT . You will need to WEAR THIS AT ALL TIMES EVEN WHILE IN BED.     4. ELEVATE: Elevating your leg means laying with your head on a pillow and your foot ABOVE YOUR HEART.     5. DO NOT MOVE YOUR FOOT.     There is a risk of worsening the wound or incision. To give yourself a higher chance of healing, please DO NOT swing foot back and forth and wiggle foot/toes especially when inside a stabilization device. Limited movement is allowable with therapy as recommended by the doctor.     6. TAKE A PROTEIN SHAKE TWICE A DAY.  (For ex: Boost, Ensure, Glucerna)     7. KEEP YOUR WOUND DRY AT ALL TIMES      Use a shower bag or a cast cover to keep your foot/leg dry during showers. These can be purchased on [employer] or any pharmacy.        Dressing Change lnstructions     Standard - Wound Vac Instructions     1. 2x weekly and as needed cleanse the area with NS     2. Pat dry     3. Apply Cavilon no sting barrier wipe to the skin surrounding the wound to protect from drainage/maceration     4. Apply drape around incision. Cut strip of drape and apply to skin if bridging is needed; plan this area in advance; should not be over bony prominence     5. Cut the foam to fit the area of the incision     6. Cut narrow strip of foam if bridging     7. Cover foam with drape to obtain air tight seal     8. Cut opening the size of a quarter for where the suction pad will be applied     9. Apply Suction pad     10. 125mmHG suction continuous     [employer] [facility] can be reached at [phone], 24 hours a day 7 days a week      - Back up plan in place:      If the negative pressure wound  therapy malfunctions or unable to maintain seal: dressing must be removed and reapplied within 2 hours of the incident. If unable to reapply negative pressure wound dressing, place Normal Saline moistened gauze in wound bed and cover with appropriate dressing to keep wound bed moist.   Change wet-to-moist dressing two times a day until healthcare staff can re-implement negative pressure therapy. Change canister at least weekly.   [facility] Contact Center can be reached at [phone], 24 hours a day 7 days a week       SEEK MEDICAL CARE IF:   You have an increase in swelling, pain, or redness around the wound.   You have an increase in the amount of pus coming from the wound.   There is a bad smell coming from the wound.   The wound appears to be worsening/enlarging   You have a fever greater than 101.5 F       It is ok to continue current wound care treatment/products for the next 2-3 days until new wound care supplies are ordered and arrive. If longer than this please contact our office at [phone].       We want to hear from you!   In the next few weeks, you should receive a call or email to complete a survey about your visit at [facility]. Please help us improve your appointment experience by letting us know how we did today. We strive to make your experience good and value any ways in which we could do better.      We value your input and suggestions.     Thank you for choosing the [facility]!

## 2024-09-06 ENCOUNTER — OFFICE VISIT (OUTPATIENT)
Dept: VASCULAR SURGERY | Facility: CLINIC | Age: 54
End: 2024-09-06
Attending: PODIATRIST

## 2024-09-06 VITALS
HEART RATE: 78 BPM | DIASTOLIC BLOOD PRESSURE: 84 MMHG | TEMPERATURE: 97 F | OXYGEN SATURATION: 99 % | SYSTOLIC BLOOD PRESSURE: 136 MMHG

## 2024-09-06 DIAGNOSIS — E11.621 DIABETIC ULCER OF LEFT GREAT TOE (H): ICD-10-CM

## 2024-09-06 DIAGNOSIS — E11.621 DIABETIC ULCER OF TOE OF LEFT FOOT ASSOCIATED WITH TYPE 2 DIABETES MELLITUS, WITH NECROSIS OF BONE (H): Primary | ICD-10-CM

## 2024-09-06 DIAGNOSIS — L97.524 DIABETIC ULCER OF TOE OF LEFT FOOT ASSOCIATED WITH TYPE 2 DIABETES MELLITUS, WITH NECROSIS OF BONE (H): Primary | ICD-10-CM

## 2024-09-06 DIAGNOSIS — L97.529 DIABETIC ULCER OF LEFT GREAT TOE (H): ICD-10-CM

## 2024-09-06 PROCEDURE — 97607 NEG PRS WND THR NDME<=50SQCM: CPT

## 2024-09-06 ASSESSMENT — PAIN SCALES - GENERAL: PAINLEVEL: NO PAIN (0)

## 2024-09-06 NOTE — PATIENT INSTRUCTIONS
Important lnstructions       1. WEIGHT BEARING STATUS: You are to remain NON WEIGHT BEARING on your left foot. NON WEIGHT BEARING MEANS NO PRESSURE ON YOUR FOOT OR HEEL AT ANY TIME FOR ANY REASON!     2. OFFLOADING DEVICE: Must use a A ROLLING KNEE WALKER at all times! (do not use affected foot to push wheelchair)     3. STABILIZATION DEVICE: Use a PRAFO BOOT . You will need to WEAR THIS AT ALL TIMES EVEN WHILE IN BED.     4. ELEVATE: Elevating your leg means laying with your head on a pillow and your foot ABOVE YOUR HEART.     5. DO NOT MOVE YOUR FOOT.     There is a risk of worsening the wound or incision. To give yourself a higher chance of healing, please DO NOT swing foot back and forth and wiggle foot/toes especially when inside a stabilization device. Limited movement is allowable with therapy as recommended by the doctor.     6. TAKE A PROTEIN SHAKE TWICE A DAY.  (For ex: Boost, Ensure, Glucerna)     7. KEEP YOUR WOUND DRY AT ALL TIMES      Use a shower bag or a cast cover to keep your foot/leg dry during showers. These can be purchased on [employer] or any pharmacy.        Dressing Change lnstructions     Standard - Wound Vac Instructions     1. 2x weekly and as needed cleanse the area with NS     2. Pat dry     3. Apply Cavilon no sting barrier wipe to the skin surrounding the wound to protect from drainage/maceration     4. Apply drape around incision. Cut strip of drape and apply to skin if bridging is needed; plan this area in advance; should not be over bony prominence     5. Cut the foam to fit the area of the incision     6. Cut narrow strip of foam if bridging     7. Cover foam with drape to obtain air tight seal     8. Cut opening the size of a quarter for where the suction pad will be applied     9. Apply Suction pad     10. 125mmHG suction continuous     [employer] [facility] can be reached at [phone], 24 hours a day 7 days a week      - Back up plan in place:      If the negative pressure wound  therapy malfunctions or unable to maintain seal: dressing must be removed and reapplied within 2 hours of the incident. If unable to reapply negative pressure wound dressing, place Normal Saline moistened gauze in wound bed and cover with appropriate dressing to keep wound bed moist.   Change wet-to-moist dressing two times a day until healthcare staff can re-implement negative pressure therapy. Change canister at least weekly.   [facility] Contact Center can be reached at [phone], 24 hours a day 7 days a week       SEEK MEDICAL CARE IF:   You have an increase in swelling, pain, or redness around the wound.   You have an increase in the amount of pus coming from the wound.   There is a bad smell coming from the wound.   The wound appears to be worsening/enlarging   You have a fever greater than 101.5 F       It is ok to continue current wound care treatment/products for the next 2-3 days until new wound care supplies are ordered and arrive. If longer than this please contact our office at [phone].       We want to hear from you!   In the next few weeks, you should receive a call or email to complete a survey about your visit at [facility]. Please help us improve your appointment experience by letting us know how we did today. We strive to make your experience good and value any ways in which we could do better.      We value your input and suggestions.     Thank you for choosing the [facility]!

## 2024-09-08 LAB — BACTERIA TISS BX CULT: NO GROWTH

## 2024-09-09 ENCOUNTER — OFFICE VISIT (OUTPATIENT)
Dept: VASCULAR SURGERY | Facility: CLINIC | Age: 54
End: 2024-09-09
Attending: PODIATRIST

## 2024-09-09 VITALS
DIASTOLIC BLOOD PRESSURE: 69 MMHG | OXYGEN SATURATION: 99 % | TEMPERATURE: 98.2 F | SYSTOLIC BLOOD PRESSURE: 113 MMHG | HEART RATE: 78 BPM | RESPIRATION RATE: 12 BRPM

## 2024-09-09 DIAGNOSIS — L97.529 DIABETIC ULCER OF LEFT GREAT TOE (H): ICD-10-CM

## 2024-09-09 DIAGNOSIS — L97.524 DIABETIC ULCER OF TOE OF LEFT FOOT ASSOCIATED WITH TYPE 2 DIABETES MELLITUS, WITH NECROSIS OF BONE (H): Primary | ICD-10-CM

## 2024-09-09 DIAGNOSIS — E11.621 DIABETIC ULCER OF TOE OF LEFT FOOT ASSOCIATED WITH TYPE 2 DIABETES MELLITUS, WITH NECROSIS OF BONE (H): Primary | ICD-10-CM

## 2024-09-09 DIAGNOSIS — E11.621 DIABETIC ULCER OF LEFT GREAT TOE (H): ICD-10-CM

## 2024-09-09 PROCEDURE — 97607 NEG PRS WND THR NDME<=50SQCM: CPT

## 2024-09-09 NOTE — PROGRESS NOTES
[facility]  -  Nurse Visit              Date of Service:  September 9, 2024     Requesting Provider: SILVIA Braswell    Diagnosis:     ICD-10-CM    1. Diabetic ulcer of toe of left foot associated with type 2 diabetes mellitus, with necrosis of bone (H)  E11.621     L97.524       2. Diabetic ulcer of left great toe (H)  E11.621     L97.529           Chief Complaint: Petar is being seen today at [facility] [place] for his wound(s) dressing change. Reports pain of 0.  Denies any fevers, chills, or generalized ill feeling.     Dressing on Arrival: NPWT, Pt is currently using no compression. Upon removal of dressings slight Serosanguinous drainage is noted.    New Wounds noted: No    Vital Signs: /69   Pulse 78   Temp 98.2  F (36.8  C)   Resp 12   SpO2 99%     Assessment:      General:  Patient presents to clinic in no apparent distress.   Psychiatric:  Alert and oriented x3.   Lower extremity:  edema is present.    Integumentary:  Skin is WNL    Circumferential volume measures:       No data to display                Wound info:  Negative Pressure Wound Therapy Toe (Comment  which one) Anterior;Left (Active)   Wound Type Surgical 08/15/24 0855   Cycle Continuous;On 08/15/24 0855   Target Pressure (mmHg) 125 08/15/24 0855   Cannister changed? No 08/15/24 0000   Output (ml) 0 ml 08/15/24 0000       VASC Wound Left hallux (Active)       VASC Wound L hallux (Active)   Pre Size Length 1.8 09/09/24 1310   Pre Size Width 1 09/09/24 1310   Pre Size Depth 0.7 09/09/24 1310   Pre Total Sq cm 1.8 09/09/24 1310   Product Used Negative pressure 08/26/24 1500       Incision/Surgical Site 08/11/24 Anterior;Left Toe (Comment  which one) (Active)   Incision Assessment UT 08/15/24 0000   Dressing Vacuum based 08/15/24 0000   Talia-Incision Assessment UT 08/14/24 1700   Closure LORETTA 08/14/24 0045   Incision Drainage Amount None 08/15/24 0000   Drainage Description [facility] 08/14/24  0045   Dressing Intervention Clean, dry, intact 08/14/24 0825       Undermining is not present.    The periwound skin is maceration      Plan:         1. Patient will return in 2 days for nurse visit.            2. As listed below, treatment provided irrigation, mechanical cleansing, and dressings to promote autolytic debridement.             Cleansed with: Normal saline and soap and water    Protected skin with: 3M Cavilon and [employer] barrier ring . Stoma powder applied prior to 3M Cavilon.    Dressings Applied to wound: Negative wound vac therapy:      Applied vac drape to the periwound skin to protect from the drainage and sponge; window pane fashion   Cut the black vac foam to fit the size of the wound. Used 1 piece(s) of black sponge total to wound bed and 1 for bridge.  Applied vac drape to wherever the bridging is going to be (NEVER apply black sponge to the intact skin)   Covered with vac drape for air tight seal. Cut a hole the size of a quarter and applied suction pad. Be mindful of the direction of the tubing.  Connected tubing and turned vac machine to 125mmHG continuous suction.   No alarm or leaks noted.    Compression Applied to the right leg: None  Compression Applied to the left leg: None    Offloading used:  crutches    Trial Products: No    Provider notified regarding concerns: No    Treatment Changes: No    Tolerated Dressing Change:  Yes    Taught Regarding: follow up appointment(s), wound cares, wound care supplies, offloading, compliance, and antibiotics    Educational Barriers: none      Jana Martins RN, CWOCN

## 2024-09-11 ENCOUNTER — OFFICE VISIT (OUTPATIENT)
Dept: VASCULAR SURGERY | Facility: CLINIC | Age: 54
End: 2024-09-11
Attending: PODIATRIST

## 2024-09-11 VITALS
DIASTOLIC BLOOD PRESSURE: 73 MMHG | TEMPERATURE: 98.1 F | OXYGEN SATURATION: 98 % | HEART RATE: 88 BPM | SYSTOLIC BLOOD PRESSURE: 131 MMHG

## 2024-09-11 DIAGNOSIS — E11.621 DIABETIC ULCER OF TOE OF LEFT FOOT ASSOCIATED WITH TYPE 2 DIABETES MELLITUS, WITH NECROSIS OF MUSCLE (H): Primary | ICD-10-CM

## 2024-09-11 DIAGNOSIS — L97.523 DIABETIC ULCER OF TOE OF LEFT FOOT ASSOCIATED WITH TYPE 2 DIABETES MELLITUS, WITH NECROSIS OF MUSCLE (H): Primary | ICD-10-CM

## 2024-09-11 PROCEDURE — 97605 NEG PRS WND THER DME<=50SQCM: CPT | Performed by: PODIATRIST

## 2024-09-11 PROCEDURE — 11043 DBRDMT MUSC&/FSCA 1ST 20/<: CPT | Performed by: PODIATRIST

## 2024-09-11 ASSESSMENT — PAIN SCALES - GENERAL: PAINLEVEL: SEVERE PAIN (6)

## 2024-09-11 NOTE — PATIENT INSTRUCTIONS
*Wheelchairs are never guaranteed at the front door, if you have your own wheel chair or knee walker, please bring that with you to your appointment. Thank you for your understanding!*    Wound care supplies were not ordered or needed today.    Important lnstructions      WEIGHT BEARING STATUS: You are to remain NON WEIGHT BEARING on your left foot. NON WEIGHT BEARING MEANS NO PRESSURE ON YOUR FOOT OR HEEL AT ANY TIME FOR ANY REASON!    2. OFFLOADING DEVICE: Must use a A ROLLING KNEE WALKER at all times! (do not use affected foot to push wheelchair)    3. STABILIZATION DEVICE: Use a PRAFO BOOT . You will need to WEAR THIS AT ALL TIMES EVEN WHILE IN BED.     4. ELEVATE: Elevating your leg means laying with your head on a pillow and your foot ABOVE YOUR HEART.     5. DO NOT MOVE YOUR FOOT.  There is a risk of worsening the wound or incision. To give yourself a higher chance of healing, please DO NOT swing foot back and forth and wiggle foot/toes especially when inside a stabilization device. Limited movement is allowable with therapy as recommended by the doctor.     6. TAKE A PROTEIN SHAKE TWICE A DAY.  (For ex: Boost, Ensure, Glucerna)    7. KEEP YOUR WOUND DRY AT ALL TIMES    Use a shower bag or a cast cover to keep your foot/leg dry during showers. These can be purchased on [employer] or any pharmacy.         Dressing Change lnstructions    Standard - Wound Vac Instructions    1. 2x weekly and as needed cleanse the area with NS    2. Pat dry    3. Apply Cavilon no sting barrier wipe to the skin surrounding the wound to protect from drainage/maceration    4. Apply drape around incision. Cut strip of drape and apply to skin if bridging is needed; plan this area in advance; should not be over bony prominence     5. Cut the foam to fit the area of the incision    6. Cut narrow strip of foam if bridging    7. Cover foam with drape to obtain air tight seal    8. Cut opening the size of a quarter for where the suction pad  will be applied    9. Apply Suction pad    10. 125mmHG suction continuous    [employer] can be reached at [phone], 24 hours a day 7 days a week     - Back up plan in place:     If the negative pressure wound therapy malfunctions or unable to maintain seal: dressing must be removed and reapplied within 2 hours of the incident. If unable to reapply negative pressure wound dressing, place Normal Saline moistened gauze in wound bed and cover with appropriate dressing to keep wound bed moist.  Change wet-to-moist dressing two times a day until healthcare staff can re-implement negative pressure therapy. Change canister at least weekly.  [employer] can be reached at [phone], 24 hours a day 7 days a week    Information on Vacuum-Assisted Closure of a Wound  Vacuum-assisted closure (VAC) of a wound is a type of treatment to help wounds heal. It s also known as negative pressure wound therapy. During the treatment, a device lowers air pressure on the wound. This can help the wound heal more quickly.  Understanding the wound VAC system  A wound VAC system has several parts. A foam or gauze dressing is put directly on the wound. The dressing is changed every 24 to 72 hours. An adhesive film covers and seals the dressing and wound. A drainage tube leads from under the adhesive film and connects to a portable vacuum pump. This pump removes air pressure over the wound. It may do this constantly. Or it may do it in cycles. During the treatment, you ll need to carry the portable pump everywhere you go.  Why wound VAC is used  You might need this therapy for a recent traumatic wound. Or you may need it for a chronic wound. This is a wound that does not heal the way it should over time. This can happen with wounds in people who have diabetes. You may need a wound VAC if you ve had a recent skin graft. And you may need a wound VAC for a large wound. Large wounds can take a longer time to heal.  A wound  vacuum system may help your wound heal more quickly by:  Draining extra fluid from the wound  Reducing swelling  Reducing bacteria in the wound  Keeping your wound moist and warm  Helping draw together wound edges  Increasing blood flow to your wound  Decreasing inflammation  Wound VAC offers some other advantages over other types of wound care. It may decrease your overall discomfort. The dressings usually need to be changed less often. And they may be easier to keep in position.             SEEK MEDICAL CARE IF:  You have an increase in swelling, pain, or redness around the wound.  You have an increase in the amount of pus coming from the wound.  There is a bad smell coming from the wound.  The wound appears to be worsening/enlarging  You have a fever greater than 101.5 F      It is ok to continue current wound care treatment/products for the next 2-3 days until new wound care supplies are ordered and arrive. If longer than this please contact our office at [phone].      We want to hear from you!   In the next few weeks, you should receive a call or email to complete a survey about your visit at [facility]. Please help us improve your appointment experience by letting us know how we did today. We strive to make your experience good and value any ways in which we could do better.      We value your input and suggestions.    Thank you for choosing the [facility]!

## 2024-09-11 NOTE — PROGRESS NOTES
FOOT AND ANKLE SURGERY/PODIATRY Progress Note      ASSESSMENT:   Acute osteomyelitis left hallux  Diabetic ulceration left hallux into muscle       TREATMENT:  -I discussed with the patient that the ulceration on the plantar left hallux has improved in the previous visit with increasing granulation tissue, no exposed bone on exam today.    -Based on the above, I recommend we continue with the wound VAC and also continue with nonweightbearing on the left foot.  I have asked my office to prior Auth him for skin grafts.    -After discussion of risk factors, nursing staff removed dressing, cleansed wound and consent obtained 2% Lidocaine HCL jelly was applied, under clean conditions, the left hallux ulceration(s) were debrided using #15 blade scalpel.  Devitalized and nonviable tissue, along with any fibrin and slough, was removed to improve granulation tissue formation, stimulate wound healing, decrease overall bacteria load, disrupt biofilm formation and decrease edge senescence. Wound drainage was scant No. Total excisional debridement was 1.8 sq cm into the muscle/fascia with a depth of 0.8 cm.   Ulcers were improved afterwards and .  Measures were as noted on the flow sheet.  Wound VAC applied to the left hallux today.    -He will follow-up in 2 weeks    Abdirizak Braswell DPM  [facility] [place]      HPI: Petar Fermin was seen again today for a left foot ulceration.  Doing well today.  He is remained mostly nonweightbearing on his left foot.  He is using wound VAC as directed.    Past Medical History:   Diagnosis Date    DM2 (diabetes mellitus, type 2) (H)        Past Surgical History:   Procedure Laterality Date    IRRIGATION AND DEBRIDEMENT FOOT, COMBINED Left 8/11/2024    Procedure: IRRIGATION AND DEBRIDEMENT, LEFT FOOT;  Surgeon: Prabhakar Waters DPM;  [facility]       No Known Allergies      Current Outpatient Medications:     amoxicillin-clavulanate (AUGMENTIN)  875-125 MG tablet, Take 1 tablet by mouth every 12 hours, Disp: 56 tablet, Rfl: 0    glipiZIDE (GLUCOTROL XL) 10 MG 24 hr tablet, Take 1 tablet (10 mg) by mouth daily, Disp: 30 tablet, Rfl: 2    ibuprofen (ADVIL/MOTRIN) 200 MG tablet, Take 400 mg by mouth every 6 hours as needed for pain, Disp: , Rfl:     metFORMIN (GLUCOPHAGE XR) 500 MG 24 hr tablet, Take 2,000 mg by mouth daily (with dinner), Disp: , Rfl:     sulfamethoxazole-trimethoprim (BACTRIM DS) 800-160 MG tablet, Take 1 tablet by mouth 3 times daily, Disp: 84 tablet, Rfl: 0    Review of Systems - 10 point Review of Systems is negative except for left hallux ulcer which is noted in HPI.      OBJECTIVE:  /73   Pulse 88   Temp 98.1  F (36.7  C)   SpO2 98%   General appearance: Patient is alert and fully cooperative with history & exam.  No sign of distress is noted during the visit.    Vascular: Dorsalis pedis non-palpableLeft.  Dermatologic:    Negative Pressure Wound Therapy Toe (Comment  which one) Anterior;Left (Active)   Wound Type Surgical 08/15/24 0855   Cycle Continuous;On 08/15/24 0855   Target Pressure (mmHg) 125 08/15/24 0855   Cannister changed? No 08/15/24 0000   Output (ml) 0 ml 08/15/24 0000       VASC Wound Left hallux (Active)       VASC Wound L hallux (Active)   Pre Size Length 1.8 09/11/24 1300   Pre Size Width 1 09/11/24 1300   Pre Size Depth 0.7 09/11/24 1300   Pre Total Sq cm 1.8 09/11/24 1300   Product Used Negative pressure 08/26/24 1500       Incision/Surgical Site 08/11/24 Anterior;Left Toe (Comment  which one) (Active)   Incision Assessment [facility] 08/15/24 0000   Dressing Vacuum based 08/15/24 0000   Talia-Incision Assessment [facility] 08/14/24 1700   Closure LORETTA 08/14/24 0045   Incision Drainage Amount None 08/15/24 0000   Drainage Description [facility] 08/14/24 0045   Dressing Intervention Clean, dry, intact 08/14/24 0825   Majority of the plantar left hallux ulceration has a granular base with hyperkeratotic/macerated rim and slight  increased depth, no erythema.  Neurologic: Diminished to light touch Left.  Musculoskeletal: Contracted digits noted Left.      Picture:

## 2024-09-12 ENCOUNTER — OFFICE VISIT (OUTPATIENT)
Dept: INFECTIOUS DISEASES | Facility: CLINIC | Age: 54
End: 2024-09-12

## 2024-09-12 ENCOUNTER — TELEPHONE (OUTPATIENT)
Dept: VASCULAR SURGERY | Facility: CLINIC | Age: 54
End: 2024-09-12

## 2024-09-12 ENCOUNTER — LAB (OUTPATIENT)
Dept: LAB | Facility: CLINIC | Age: 54
End: 2024-09-12

## 2024-09-12 VITALS
SYSTOLIC BLOOD PRESSURE: 110 MMHG | OXYGEN SATURATION: 100 % | HEART RATE: 92 BPM | TEMPERATURE: 98.2 F | DIASTOLIC BLOOD PRESSURE: 62 MMHG

## 2024-09-12 DIAGNOSIS — L08.9 TOE INFECTION: Primary | ICD-10-CM

## 2024-09-12 DIAGNOSIS — L08.9 TOE INFECTION: ICD-10-CM

## 2024-09-12 LAB
ANION GAP SERPL CALCULATED.3IONS-SCNC: 10 MMOL/L (ref 7–15)
BASOPHILS # BLD AUTO: 0 10E3/UL (ref 0–0.2)
BASOPHILS NFR BLD AUTO: 0 %
BUN SERPL-MCNC: 17 MG/DL (ref 6–20)
CALCIUM SERPL-MCNC: 9.2 MG/DL (ref 8.8–10.4)
CHLORIDE SERPL-SCNC: 101 MMOL/L (ref 98–107)
CREAT SERPL-MCNC: 0.71 MG/DL (ref 0.67–1.17)
CRP SERPL-MCNC: <3 MG/L
EGFRCR SERPLBLD CKD-EPI 2021: >90 ML/MIN/1.73M2
EOSINOPHIL # BLD AUTO: 0.2 10E3/UL (ref 0–0.7)
EOSINOPHIL NFR BLD AUTO: 5 %
ERYTHROCYTE [DISTWIDTH] IN BLOOD BY AUTOMATED COUNT: 12.9 % (ref 10–15)
GLUCOSE SERPL-MCNC: 147 MG/DL (ref 70–99)
HCO3 SERPL-SCNC: 28 MMOL/L (ref 22–29)
HCT VFR BLD AUTO: 41 % (ref 40–53)
HGB BLD-MCNC: 13.4 G/DL (ref 13.3–17.7)
IMM GRANULOCYTES # BLD: 0 10E3/UL
IMM GRANULOCYTES NFR BLD: 0 %
LYMPHOCYTES # BLD AUTO: 1.3 10E3/UL (ref 0.8–5.3)
LYMPHOCYTES NFR BLD AUTO: 25 %
MCH RBC QN AUTO: 30.2 PG (ref 26.5–33)
MCHC RBC AUTO-ENTMCNC: 32.7 G/DL (ref 31.5–36.5)
MCV RBC AUTO: 92 FL (ref 78–100)
MONOCYTES # BLD AUTO: 0.4 10E3/UL (ref 0–1.3)
MONOCYTES NFR BLD AUTO: 8 %
NEUTROPHILS # BLD AUTO: 3.3 10E3/UL (ref 1.6–8.3)
NEUTROPHILS NFR BLD AUTO: 63 %
PLATELET # BLD AUTO: 235 10E3/UL (ref 150–450)
POTASSIUM SERPL-SCNC: 4.3 MMOL/L (ref 3.4–5.3)
RBC # BLD AUTO: 4.44 10E6/UL (ref 4.4–5.9)
SODIUM SERPL-SCNC: 139 MMOL/L (ref 135–145)
WBC # BLD AUTO: 5.3 10E3/UL (ref 4–11)

## 2024-09-12 PROCEDURE — 36415 COLL VENOUS BLD VENIPUNCTURE: CPT

## 2024-09-12 PROCEDURE — 86140 C-REACTIVE PROTEIN: CPT

## 2024-09-12 PROCEDURE — 99214 OFFICE O/P EST MOD 30 MIN: CPT | Performed by: INTERNAL MEDICINE

## 2024-09-12 PROCEDURE — 85025 COMPLETE CBC W/AUTO DIFF WBC: CPT

## 2024-09-12 PROCEDURE — 80048 BASIC METABOLIC PNL TOTAL CA: CPT

## 2024-09-12 RX ORDER — SULFAMETHOXAZOLE/TRIMETHOPRIM 800-160 MG
1 TABLET ORAL 3 TIMES DAILY
Qty: 42 TABLET | Refills: 0 | Status: SHIPPED | OUTPATIENT
Start: 2024-09-12 | End: 2024-09-26

## 2024-09-12 NOTE — PROGRESS NOTES
[facility] INFECTIOUS DISEASE CLINIC [facility]   FOLLOW UP NOTE    Date: 09/12/2024   Patient Name: Petar Fermin   YOB: 1970  MRN: [id number]      ASSESSMENT:  54-year-old male with a history of poorly controlled diabetes who is to follow-up for left foot infection.     Left diabetic foot infection.  Patient identified foreign body.  Subsequent swelling, erythema, fevers.  MRI showing osteomyelitis at the distal phalanx.  S/p debridement 8/11 - patient declining amputation.  Wound culture on 8/10 is polymicrobial. Intra-op culture with Staph lugdunensis (MSSL), K.pneumoniae and C.freundii. Citrobacter resistant to pipercillin/tazobactam. Continues to receive wound cares, improving. Treated with amoxicillin/clauvulanic acid and TMP/SMX   Poorly controlled diabetes.  A1c 13.3. on metformin. Follow-up appt with PCP tomorrow AM  ABIs normal bilaterally     PLAN:  -renew amoxicillin/clauvulanic acid and TMP/SMX DS tid x 2 weeks, to complete 6 wk course  -continue follow-up with podiatry  -cbc, creatinine, and CRP today  -follow-up with PCP to address diabetes management    Return to clinic as needed.    Magen Ramirez MD  [facility]   Clinic phone: [phone]  Clinic fax: [phone]    ______________________________________________________________________    HISTORY OF PRESENT ILLNESS:   From inpatient ID consult on 8/11:    Petar Fermin is a 54 year old man with a history of diabetes who is admitted with left foot infection.  The patient denies previous foot infections.  Earlier this week he noticed erythema in his foot.  He identified a small piece of metal wire that he removed from the tip of his first toe.  He was also having fevers, controlled with NSAIDs.  He went to urgent care yesterday and was directed to the ER.  MRI showed signs of osteomyelitis.  He was seen by podiatry who plans to take him to the OR today for debridement.     The patient reports chronic  "neuropathy.  He has close inspection of his feet regularly.  He wears sandals or crocs regularly at work because of the neuropathy.      SUBJECTIVE / INTERVAL HISTORY:   Petar Fermin returns for follow up.  Doing well. Sees podiatry regularly. Wound slowly healing. Still with wound vac in place. Still on antibiotics, but finishing today. No issues today. Says glucose is okay, 160 yesterday. Has appt at [facility] tomorrow.      ROS:   No fevers, rashes       Current Outpatient Medications:     amoxicillin-clavulanate (AUGMENTIN) 875-125 MG tablet, Take 1 tablet by mouth every 12 hours, Disp: 56 tablet, Rfl: 0    glipiZIDE (GLUCOTROL XL) 10 MG 24 hr tablet, Take 1 tablet (10 mg) by mouth daily, Disp: 30 tablet, Rfl: 2    ibuprofen (ADVIL/MOTRIN) 200 MG tablet, Take 400 mg by mouth every 6 hours as needed for pain, Disp: , Rfl:     metFORMIN (GLUCOPHAGE XR) 500 MG 24 hr tablet, Take 2,000 mg by mouth daily (with dinner), Disp: , Rfl:     sulfamethoxazole-trimethoprim (BACTRIM DS) 800-160 MG tablet, Take 1 tablet by mouth 3 times daily, Disp: 84 tablet, Rfl: 0      OBJECTIVE:  /62 (BP Location: Right arm, Patient Position: Sitting, Cuff Size: Adult Regular)   Pulse 92   Temp 98.2  F (36.8  C) (Oral)   SpO2 100%       GEN: No acute distress.    HENT: Head is normocephalic, atraumatic.   EYES: Eyes have anicteric sclerae without conjunctival injection   RESPIRATORY:  Normal breathing pattern.   EXTREMITIES: No edema.   SKIN/HAIR/NAILS:  No rashes. Left foot with wound vac. No surrounding inflammation/tenderness.  NEUROLOGIC: Grossly nonfocal. Normal gait and station      Pertinent labs:    Lab Results   Component Value Date    CRPI 53.00 (H) 08/10/2024         No results found for: \"ALT\", \"AST\", \"GGT\", \"ALKPHOS\", \"BILITOT\"      MICROBIOLOGY DATA:      Susceptibility data from last 90 days.  Collected Specimen Info Organism Ampicillin Ampicillin/Sulbactam Cefepime Ceftazidime Ceftriaxone Ciprofloxacin " Clindamycin Daptomycin Doxycycline Erythromycin Gentamicin Gentamicin Synergy Levofloxacin Meropenem   08/11/24 Tissue from Toe, Left Citrobacter freundii complex                   Klebsiella pneumoniae                   Staphylococcus lugdunensis                 08/10/24 Wound from Toe, Left Klebsiella pneumoniae R  S  S  S  S  S      S   S  S     Citrobacter freundii complex R R  S R R  S      S   S  S     Enterococcus faecalis  S            S       Staphylococcus lugdunensis        S  S  S  S  S        Streptococcus canis (Group G Streptococcus)                   Collected Specimen Info Organism Oxacillin Piperacillin/Tazobactam Tetracycline Tobramycin Trimethoprim/Sulfamethoxazole  Vancomycin   08/11/24 Tissue from Toe, Left Citrobacter freundii complex           Klebsiella pneumoniae           Staphylococcus lugdunensis         08/10/24 Wound from Toe, Left Klebsiella pneumoniae   S   S  S      Citrobacter freundii complex  R   S  S      Enterococcus faecalis       S     Staphylococcus lugdunensis  S   S   S  S     Streptococcus canis (Group G Streptococcus)               RADIOLOGY:  US Lower Extremity Arterial Duplex Bilateral    Result Date: 8/12/2024  [facility] 1. EXAM: RESTING ANKLE-BRACHIAL INDICES (ABIs) 2. DUPLEX ARTERIAL ULTRASOUND OF THE LOWER EXTREMITIES BILATERALLY 8/12/2024 2:23 PM CDT INDICATION: Diabetic foot infection. TECHNIQUE: Duplex imaging is performed utilizing gray-scale, two-dimensional images and color-flow imaging. Doppler waveform analysis and spectral Doppler imaging is also performed. COMPARISON: None FINDINGS: SEGMENTAL BP RIGHT Brachial: -- Ankle (PT): 167; Index: 1.27 Ankle (DP): 160; Index: 1.22 Digit: 125; Index: 0.95 LEFT Brachial: 131 Ankle (PT): 156; Index: 1.19 Ankle (DP): 148; Index: 1.13 Digit: 92; Index: 0.70 Resting ABIs are 1.3 on the right. Resting ABIs are  1.2 on the left. WAVEFORMS: Triphasic waveforms bilaterally. Normal waveforms and toe  pressures in the bilateral toes. DUPLEX ARTERIAL ULTRASOUND FINDINGS:  (Peak Systole/End Diastole) RIGHT (cm/s) EIA: 147/23 CFA: 95/17 PFA: 68/-- SFA-Proximal: 99/17 SFA-Mid: 91/15 SFA-Distal: 71/14 Popliteal-Proximal: 65/14 Popliteal-Distal: 98/21 PTA: 84/22 KIRAN: 80/24 DPA: 40/13 LEFT (cm/s) EIA: 146/22 CFA: 95/11 PFA: 87/10 SFA-Proximal: 98/13 SFA-Mid: 110/15 SFA-Distal: 103/19 Popliteal-Proximal: 104/21 Popliteal-Distal: 117/16 PTA: 126/36 KIRAN: 130/39 DPA: 41/10 RIGHT: Right external iliac artery, common femoral artery, profunda femoral artery, SFA and popliteal arteries are widely patent with normal velocities and multiphasic waveforms. The right PTA, KIRAN and DPA are patent with multiphasic waveforms. LEFT: The left external iliac artery, common femoral artery, profunda femoral artery, SFA and popliteal arteries are widely patent with normal velocities and multiphasic waveforms. Left PTA and KIRAN/DPA are patent with normal velocities and multiphasic waveforms.     IMPRESSION: 1.  RIGHT LOWER EXTREMITY: Right leg resting CHELSEY of 1.3 is within normal limits. No significant stenosis within the right femoral-popliteal segments. The right PTA, KIRAN and DPA are patent with multiphasic waveforms. 2.  LEFT LOWER EXTREMITY: Left leg resting CHELSEY of 1.2 is within normal limits.  No significant stenosis within the left femoral-popliteal segments. The left PTA, KIRAN and DPA are patent with multiphasic waveforms.    US CHELSEY with PPG wo Exercise    Result Date: 8/12/2024  [facility] 1. EXAM: RESTING ANKLE-BRACHIAL INDICES (ABIs) 2. DUPLEX ARTERIAL ULTRASOUND OF THE LOWER EXTREMITIES BILATERALLY 8/12/2024 2:23 PM CDT INDICATION: Diabetic foot infection. TECHNIQUE: Duplex imaging is performed utilizing gray-scale, two-dimensional images and color-flow imaging. Doppler waveform analysis and spectral Doppler imaging is also performed. COMPARISON: None FINDINGS: SEGMENTAL BP RIGHT Brachial: -- Ankle (PT): 167; Index:  "1.27 Ankle (DP): 160; Index: 1.22 Digit: 125; Index: 0.95 LEFT Brachial: 131 Ankle (PT): 156; Index: 1.19 Ankle (DP): 148; Index: 1.13 Digit: 92; Index: 0.70 Resting ABIs are 1.3 on the right. Resting ABIs are  1.2 on the left. WAVEFORMS: Triphasic waveforms bilaterally. Normal waveforms and toe pressures in the bilateral toes. DUPLEX ARTERIAL ULTRASOUND FINDINGS:  (Peak Systole/End Diastole) RIGHT (cm/s) EIA: 147/23 CFA: 95/17 PFA: 68/-- SFA-Proximal: 99/17 SFA-Mid: 91/15 SFA-Distal: 71/14 Popliteal-Proximal: 65/14 Popliteal-Distal: 98/21 PTA: 84/22 KIRAN: 80/24 DPA: 40/13 LEFT (cm/s) EIA: 146/22 CFA: 95/11 PFA: 87/10 SFA-Proximal: 98/13 SFA-Mid: 110/15 SFA-Distal: 103/19 Popliteal-Proximal: 104/21 Popliteal-Distal: 117/16 PTA: 126/36 KIRAN: 130/39 DPA: 41/10 RIGHT: Right external iliac artery, common femoral artery, profunda femoral artery, SFA and popliteal arteries are widely patent with normal velocities and multiphasic waveforms. The right PTA, KIRAN and DPA are patent with multiphasic waveforms. LEFT: The left external iliac artery, common femoral artery, profunda femoral artery, SFA and popliteal arteries are widely patent with normal velocities and multiphasic waveforms. Left PTA and KIRAN/DPA are patent with normal velocities and multiphasic waveforms.     IMPRESSION: 1.  RIGHT LOWER EXTREMITY: Right leg resting CHELSEY of 1.3 is within normal limits. No significant stenosis within the right femoral-popliteal segments. The right PTA, KIRAN and DPA are patent with multiphasic waveforms. 2.  LEFT LOWER EXTREMITY: Left leg resting CHELSEY of 1.2 is within normal limits.  No significant stenosis within the left femoral-popliteal segments. The left PTA, KIRAN and DPA are patent with multiphasic waveforms.    POC US Guidance Needle Placement    Result Date: 8/11/2024  Ultrasound was performed as guidance to an anesthesia procedure.  Click \"PACS images\" hyperlink below to view any stored images.  For specific procedure details, view " procedure note authored by anesthesia.    MR Foot Left w/o & w Contrast    Result Date: 8/10/2024  EXAM: MR FOOT LEFT W/O and W CONTRAST LOCATION: [facility] DATE: 8/10/2024 INDICATION: Diabetic foot infection, had preceding penetrating injury. COMPARISON: Radiographs from 8/10/2024. TECHNIQUE: Routine. Additional postgadolinium T1 sequences were obtained. IV CONTRAST: 7ml Gadavist FINDINGS: JOINTS AND BONES: -There is bone marrow edema like signal and enhancement and loss of T1 signal in the distal phalanx of the great toe. No effusion or synovitis. The bones are normal otherwise. The joints appear normal. TENDONS: -No tendon tear, tendinopathy, or tenosynovitis. LIGAMENTS: -Lisfranc ligament: Intact. No subluxation. MUSCLES AND SOFT TISSUES: -There is a tract of abnormal signal extending from the distal plantar skin surface of the great toe proximally and dorsally to near the distal tuft, as seen on image 5 of series 9 and image 17 of series 6 and image 31 of series 5. This may represent the  tract of a penetrating injury. There is no definite foreign body or abscess. There is adjacent edema and enhancement consistent with cellulitis.     IMPRESSION: 1.  There appears to be a tract from a previous penetrating injury in the distal end of the great toe extending down close to the distal tuft of the distal phalanx of the great toe. 2.  There is underlying bone marrow edema like signal and enhancement as well as loss of T1 signal in the distal phalanx of the great toe, strong evidence of osteomyelitis. 3.  There is no evidence of abscess, tenosynovitis, myositis or septic joint.    Foot XR, G/E 3 views, left    Result Date: 8/10/2024  EXAM: XR FOOT LEFT G/E 3 VIEWS LOCATION: [facility] DATE: 8/10/2024 INDICATION: Swelling and redness. Evaluate for retained foreign body in the left big toe. COMPARISON: None.     IMPRESSION: There is no evidence of an acute fracture with  attention to the great toe. There is some soft tissue swelling and irregularity however there is no discrete radiopaque foreign body identified. No significant joint space narrowing. No cortical destructive change to suggest osteomyelitis.       Attestation:  Total time preparing to see this patient, face-to-face time, and coordinating care time on the same calendar date: 20 minutes.  Face-face time: 8 minutes.  Over 50% of face-to-face time was spent in counseling/coordination of care.

## 2024-09-16 ENCOUNTER — OFFICE VISIT (OUTPATIENT)
Dept: VASCULAR SURGERY | Facility: CLINIC | Age: 54
End: 2024-09-16
Attending: PODIATRIST

## 2024-09-16 VITALS
TEMPERATURE: 98.1 F | RESPIRATION RATE: 12 BRPM | HEART RATE: 72 BPM | DIASTOLIC BLOOD PRESSURE: 60 MMHG | SYSTOLIC BLOOD PRESSURE: 112 MMHG

## 2024-09-16 DIAGNOSIS — E11.621 DIABETIC ULCER OF TOE OF LEFT FOOT ASSOCIATED WITH TYPE 2 DIABETES MELLITUS, WITH NECROSIS OF MUSCLE (H): Primary | ICD-10-CM

## 2024-09-16 DIAGNOSIS — L97.523 DIABETIC ULCER OF TOE OF LEFT FOOT ASSOCIATED WITH TYPE 2 DIABETES MELLITUS, WITH NECROSIS OF MUSCLE (H): Primary | ICD-10-CM

## 2024-09-16 PROCEDURE — 97607 NEG PRS WND THR NDME<=50SQCM: CPT

## 2024-09-16 ASSESSMENT — PAIN SCALES - GENERAL: PAINLEVEL: SEVERE PAIN (6)

## 2024-09-16 NOTE — PATIENT INSTRUCTIONS
*Wheelchairs are never guaranteed at the front door, if you have your own wheel chair or knee walker, please bring that with you to your appointment. Thank you for your understanding!*    Wound care supplies were not ordered or needed today.    Important lnstructions      WEIGHT BEARING STATUS: You are to remain NON WEIGHT BEARING on your left foot. NON WEIGHT BEARING MEANS NO PRESSURE ON YOUR FOOT OR HEEL AT ANY TIME FOR ANY REASON!    2. OFFLOADING DEVICE: Must use a A ROLLING KNEE WALKER at all times! (do not use affected foot to push wheelchair)    3. STABILIZATION DEVICE: Use a PRAFO BOOT . You will need to WEAR THIS AT ALL TIMES EVEN WHILE IN BED.     4. ELEVATE: Elevating your leg means laying with your head on a pillow and your foot ABOVE YOUR HEART.     5. DO NOT MOVE YOUR FOOT.  There is a risk of worsening the wound or incision. To give yourself a higher chance of healing, please DO NOT swing foot back and forth and wiggle foot/toes especially when inside a stabilization device. Limited movement is allowable with therapy as recommended by the doctor.     6. TAKE A PROTEIN SHAKE TWICE A DAY.  (For ex: Boost, Ensure, Glucerna)    7. KEEP YOUR WOUND DRY AT ALL TIMES    Use a shower bag or a cast cover to keep your foot/leg dry during showers. These can be purchased on [employer] or any pharmacy.         Dressing Change lnstructions    Standard - Wound Vac Instructions    1. 2x weekly and as needed cleanse the area with NS    2. Pat dry    3. Apply Cavilon no sting barrier wipe to the skin surrounding the wound to protect from drainage/maceration    4. Apply drape around incision. Cut strip of drape and apply to skin if bridging is needed; plan this area in advance; should not be over bony prominence     5. Cut the foam to fit the area of the incision    6. Cut narrow strip of foam if bridging    7. Cover foam with drape to obtain air tight seal    8. Cut opening the size of a quarter for where the suction pad  will be applied    9. Apply Suction pad    10. 125mmHG suction continuous    [employer] can be reached at [phone], 24 hours a day 7 days a week     - Back up plan in place:     If the negative pressure wound therapy malfunctions or unable to maintain seal: dressing must be removed and reapplied within 2 hours of the incident. If unable to reapply negative pressure wound dressing, place Normal Saline moistened gauze in wound bed and cover with appropriate dressing to keep wound bed moist.  Change wet-to-moist dressing two times a day until healthcare staff can re-implement negative pressure therapy. Change canister at least weekly.  [employer] can be reached at [phone], 24 hours a day 7 days a week    Information on Vacuum-Assisted Closure of a Wound  Vacuum-assisted closure (VAC) of a wound is a type of treatment to help wounds heal. It s also known as negative pressure wound therapy. During the treatment, a device lowers air pressure on the wound. This can help the wound heal more quickly.  Understanding the wound VAC system  A wound VAC system has several parts. A foam or gauze dressing is put directly on the wound. The dressing is changed every 24 to 72 hours. An adhesive film covers and seals the dressing and wound. A drainage tube leads from under the adhesive film and connects to a portable vacuum pump. This pump removes air pressure over the wound. It may do this constantly. Or it may do it in cycles. During the treatment, you ll need to carry the portable pump everywhere you go.  Why wound VAC is used  You might need this therapy for a recent traumatic wound. Or you may need it for a chronic wound. This is a wound that does not heal the way it should over time. This can happen with wounds in people who have diabetes. You may need a wound VAC if you ve had a recent skin graft. And you may need a wound VAC for a large wound. Large wounds can take a longer time to heal.  A wound  vacuum system may help your wound heal more quickly by:  Draining extra fluid from the wound  Reducing swelling  Reducing bacteria in the wound  Keeping your wound moist and warm  Helping draw together wound edges  Increasing blood flow to your wound  Decreasing inflammation  Wound VAC offers some other advantages over other types of wound care. It may decrease your overall discomfort. The dressings usually need to be changed less often. And they may be easier to keep in position.             SEEK MEDICAL CARE IF:  You have an increase in swelling, pain, or redness around the wound.  You have an increase in the amount of pus coming from the wound.  There is a bad smell coming from the wound.  The wound appears to be worsening/enlarging  You have a fever greater than 101.5 F      It is ok to continue current wound care treatment/products for the next 2-3 days until new wound care supplies are ordered and arrive. If longer than this please contact our office at [phone].      We want to hear from you!   In the next few weeks, you should receive a call or email to complete a survey about your visit at [facility]. Please help us improve your appointment experience by letting us know how we did today. We strive to make your experience good and value any ways in which we could do better.      We value your input and suggestions.    Thank you for choosing the [facility]!

## 2024-09-16 NOTE — PROGRESS NOTES
[facility]  -  Nurse Visit        Date of Service:  September 16, 2024     Requesting Provider: SILVIA Braswell    Diagnosis:     ICD-10-CM    1. Diabetic ulcer of toe of left foot associated with type 2 diabetes mellitus, with necrosis of muscle (H)  E11.621     L97.523           Chief Complaint: Petar is being seen today at [facility] for his wound(s) dressing change. Reports pain of 6/10 reports at night and is neuropathy pain.  Denies any fevers, chills, or generalized ill feeling. Arrives using crutches. States is continuing to work as a . Advised to not move your foot and no pressure applied.    Dressing on Arrival: Wound VAC running at 125 mmhgs, denies leaks,wax ring to add in seal.Upon removal of dressings light Serosanguinous drainage is noted.    New Wounds noted: No    Vital Signs: /60   Pulse 72   Temp 98.1  F (36.7  C)   Resp 12     Assessment:      General:  Patient presents to clinic in no apparent distress.   Psychiatric:  Alert and oriented x3.   Lower extremity:  edema is not present.    Integumentary:  Skin is masceration.    Circumferential volume measures:       No data to display                Wound info:  Negative Pressure Wound Therapy Toe (Comment  which one) Anterior;Left (Active)       VASC Wound Left hallux (Active)       VASC Wound L hallux (Active)   Pre Size Length 1.8 09/16/24 0900   Pre Size Width 1.5 09/16/24 0900   Pre Size Depth 0.5 09/16/24 0900   Pre Total Sq cm 2.7 09/16/24 0900   Product Used Negative pressure 09/16/24 0900       Incision/Surgical Site 08/11/24 Anterior;Left Toe (Comment  which one) (Active)       Undermining is not present.    The periwoundskin is maceration      Plan:         1. Patient will return in 3 days for nurse visit.            2. As listed below, treatment provided irrigation, mechanical cleansing, and dressings to promote autolytic debridement.             Cleansed  with: Normal saline and vashe    Protected skin with: 3M Cavilon    Dressings Applied to wound: Negative wound vac therapy:      Applied vac drape to the periwound skin to protect from the drainage and sponge; window pane fashion   Cut the black vac foam to fit the size of the wound. Used 1 piece(s) of black sponge total.   Applied vac drape to wherever the bridging is going to be (NEVER apply black sponge to the intact skin)   Covered with vac drape for air tight seal. Cut a hole the size of a quarter and applied suction pad. Be mindful of the direction of the tubing.  Connected tubing and turned vac machine to 125mmHG continuous suction.   No alarm or leaks noted.   and wax ring to periwound to add in seal     Compression Applied to the right leg: None  Compression Applied to the left leg: None          Offloading used:  crutches    Trial Products: No    Provider notified regarding concerns: No    Treatment Changes: No    Tolerated Dressing Change:  Yes    Taught Regarding: follow up appointment(s), wound cares, offloading, compliance, signs of infection, and antibiotics    Educational Barriers: No barriers      Rad Burnette RN

## 2024-09-16 NOTE — TELEPHONE ENCOUNTER
No active insurance coverage for pt. Will re run when insurance in active.    Gala Allen LPN on 9/16/2024 at 2:41 PM

## 2024-09-19 ENCOUNTER — OFFICE VISIT (OUTPATIENT)
Dept: VASCULAR SURGERY | Facility: CLINIC | Age: 54
End: 2024-09-19
Attending: PODIATRIST

## 2024-09-19 VITALS
SYSTOLIC BLOOD PRESSURE: 119 MMHG | RESPIRATION RATE: 16 BRPM | DIASTOLIC BLOOD PRESSURE: 69 MMHG | HEART RATE: 70 BPM | TEMPERATURE: 98.3 F | OXYGEN SATURATION: 98 %

## 2024-09-19 DIAGNOSIS — L97.523 DIABETIC ULCER OF TOE OF LEFT FOOT ASSOCIATED WITH TYPE 2 DIABETES MELLITUS, WITH NECROSIS OF MUSCLE (H): Primary | ICD-10-CM

## 2024-09-19 DIAGNOSIS — E11.621 DIABETIC ULCER OF TOE OF LEFT FOOT ASSOCIATED WITH TYPE 2 DIABETES MELLITUS, WITH NECROSIS OF MUSCLE (H): Primary | ICD-10-CM

## 2024-09-19 PROCEDURE — 97607 NEG PRS WND THR NDME<=50SQCM: CPT

## 2024-09-19 ASSESSMENT — PAIN SCALES - GENERAL: PAINLEVEL: NO PAIN (0)

## 2024-09-19 NOTE — PATIENT INSTRUCTIONS
*Wheelchairs are never guaranteed at the front door, if you have your own wheel chair or knee walker, please bring that with you to your appointment. Thank you for your understanding!*    Wound care supplies were not ordered or needed today.    Important lnstructions      WEIGHT BEARING STATUS: You are to remain NON WEIGHT BEARING on your left foot. NON WEIGHT BEARING MEANS NO PRESSURE ON YOUR FOOT OR HEEL AT ANY TIME FOR ANY REASON!    2. OFFLOADING DEVICE: Must use a A ROLLING KNEE WALKER at all times! (do not use affected foot to push wheelchair)    3. STABILIZATION DEVICE: Use a PRAFO BOOT . You will need to WEAR THIS AT ALL TIMES EVEN WHILE IN BED.     4. ELEVATE: Elevating your leg means laying with your head on a pillow and your foot ABOVE YOUR HEART.     5. DO NOT MOVE YOUR FOOT.  There is a risk of worsening the wound or incision. To give yourself a higher chance of healing, please DO NOT swing foot back and forth and wiggle foot/toes especially when inside a stabilization device. Limited movement is allowable with therapy as recommended by the doctor.     6. TAKE A PROTEIN SHAKE TWICE A DAY.  (For ex: Boost, Ensure, Glucerna)    7. KEEP YOUR WOUND DRY AT ALL TIMES    Use a shower bag or a cast cover to keep your foot/leg dry during showers. These can be purchased on [employer] or any pharmacy.         Dressing Change lnstructions    Standard - Wound Vac Instructions    1. 2x weekly and as needed cleanse the area with NS    2. Pat dry    3. Apply Cavilon no sting barrier wipe to the skin surrounding the wound to protect from drainage/maceration    4. Apply drape around incision. Cut strip of drape and apply to skin if bridging is needed; plan this area in advance; should not be over bony prominence     5. Cut the foam to fit the area of the incision    6. Cut narrow strip of foam if bridging    7. Cover foam with drape to obtain air tight seal    8. Cut opening the size of a quarter for where the suction pad  will be applied    9. Apply Suction pad    10. 125mmHG suction continuous    [employer] [facility] can be reached at [phone], 24 hours a day 7 days a week     - Back up plan in place:     If the negative pressure wound therapy malfunctions or unable to maintain seal: dressing must be removed and reapplied within 2 hours of the incident. If unable to reapply negative pressure wound dressing, place Normal Saline moistened gauze in wound bed and cover with appropriate dressing to keep wound bed moist.  Change wet-to-moist dressing two times a day until healthcare staff can re-implement negative pressure therapy. Change canister at least weekly.  [employer] [facility] can be reached at [phone], 24 hours a day 7 days a week    Information on Vacuum-Assisted Closure of a Wound  Vacuum-assisted closure (VAC) of a wound is a type of treatment to help wounds heal. It s also known as negative pressure wound therapy. During the treatment, a device lowers air pressure on the wound. This can help the wound heal more quickly.  Understanding the wound VAC system  A wound VAC system has several parts. A foam or gauze dressing is put directly on the wound. The dressing is changed every 24 to 72 hours. An adhesive film covers and seals the dressing and wound. A drainage tube leads from under the adhesive film and connects to a portable vacuum pump. This pump removes air pressure over the wound. It may do this constantly. Or it may do it in cycles. During the treatment, you ll need to carry the portable pump everywhere you go.  Why wound VAC is used  You might need this therapy for a recent traumatic wound. Or you may need it for a chronic wound. This is a wound that does not heal the way it should over time. This can happen with wounds in people who have diabetes. You may need a wound VAC if you ve had a recent skin graft. And you may need a wound VAC for a large wound. Large wounds can take a longer time to heal.  A wound  vacuum system may help your wound heal more quickly by:  Draining extra fluid from the wound  Reducing swelling  Reducing bacteria in the wound  Keeping your wound moist and warm  Helping draw together wound edges  Increasing blood flow to your wound  Decreasing inflammation  Wound VAC offers some other advantages over other types of wound care. It may decrease your overall discomfort. The dressings usually need to be changed less often. And they may be easier to keep in position.             SEEK MEDICAL CARE IF:  You have an increase in swelling, pain, or redness around the wound.  You have an increase in the amount of pus coming from the wound.  There is a bad smell coming from the wound.  The wound appears to be worsening/enlarging  You have a fever greater than 101.5 F      It is ok to continue current wound care treatment/products for the next 2-3 days until new wound care supplies are ordered and arrive. If longer than this please contact our office at [phone].      We want to hear from you!   In the next few weeks, you should receive a call or email to complete a survey about your visit at [facility]. Please help us improve your appointment experience by letting us know how we did today. We strive to make your experience good and value any ways in which we could do better.      We value your input and suggestions.    Thank you for choosing the [facility]!

## 2024-09-19 NOTE — PROGRESS NOTES
[facility]  -  Nurse Visit        Date of Service:  2024     Requesting Provider: SILVIA Braswell    Diagnosis:     ICD-10-CM    1. Diabetic ulcer of toe of left foot associated with type 2 diabetes mellitus, with necrosis of muscle (H)  E11.621     L97.523           Chief Complaint: Petar is being seen today at [facility] for his wound(s) dressing change. Reports pain of 0.  Denies any fevers, chills, or generalized ill feeling.     Dressing on Arrival: NPWT, Pt is currently using no compression. Upon removal of dressings small Serosanguinous drainage is noted.    New Wounds noted: No    Vital Signs: /69   Pulse 70   Temp 98.3  F (36.8  C)   Resp 16   SpO2 98%     Assessment:      General:  Patient presents to clinic in no apparent distress.   Psychiatric:  Alert and oriented x3.   Lower extremity:  edema is not present.    Integumentary:  Skin is intact    Circumferential volume measures:       No data to display                Wound info:  Negative Pressure Wound Therapy Toe (Comment  which one) Anterior;Left (Active)       VASC Wound Left hallux (Active)       VASC Wound L hallux (Active)   Pre Size Length 1.8 24 1000   Pre Size Width 1 24 1000   Pre Size Depth 0.3 24 1000   Pre Total Sq cm 1.8 24 1000   Product Used Negative pressure 24 0900       Incision/Surgical Site 24 Anterior;Left Toe (Comment  which one) (Active)       Undermining is not present.    The periwoundskin is  macerated      Plan:         1. Patient will return in 3 days for nurse visit.            2. As listed below, treatment provided irrigation, mechanical cleansing, and dressings to promote autolytic debridement.             Cleansed with: vashe    Protected skin with: 3M Cavilon    Dressings Applied to wound: Negative wound vac therapy:    Pieces of foam removed from previous dressin    Applied vac drape to the periwound  skin to protect from the drainage and sponge; window pane fashion   Cut the black vac foam to fit the size of the wound. Used 1 piece(s) of black sponge total. 1 bridge  Applied vac drape to wherever the bridging is going to be (NEVER apply black sponge to the intact skin)   Covered with vac drape for air tight seal. Cut a hole the size of a quarter and applied suction pad. Be mindful of the direction of the tubing.  Connected tubing and turned vac machine to 125mmHG continuous suction.   No alarm or leaks noted.    Compression Applied to the right leg: None  Compression Applied to the left leg: None      Offloading used:  crutches    Trial Products: No    Provider notified regarding concerns: No    Treatment Changes: No    Tolerated Dressing Change:  Yes    Taught Regarding: follow up appointment(s) and wound cares    Educational Barriers: No barriers      GARRICK MARI RN CWOCN, CFCN

## 2024-09-23 ENCOUNTER — OFFICE VISIT (OUTPATIENT)
Dept: VASCULAR SURGERY | Facility: CLINIC | Age: 54
End: 2024-09-23
Attending: PODIATRIST

## 2024-09-23 VITALS
HEART RATE: 74 BPM | SYSTOLIC BLOOD PRESSURE: 116 MMHG | RESPIRATION RATE: 12 BRPM | DIASTOLIC BLOOD PRESSURE: 74 MMHG | TEMPERATURE: 97.8 F

## 2024-09-23 DIAGNOSIS — E11.621 DIABETIC ULCER OF TOE OF LEFT FOOT ASSOCIATED WITH TYPE 2 DIABETES MELLITUS, WITH NECROSIS OF MUSCLE (H): Primary | ICD-10-CM

## 2024-09-23 DIAGNOSIS — L97.523 DIABETIC ULCER OF TOE OF LEFT FOOT ASSOCIATED WITH TYPE 2 DIABETES MELLITUS, WITH NECROSIS OF MUSCLE (H): Primary | ICD-10-CM

## 2024-09-23 PROCEDURE — 97607 NEG PRS WND THR NDME<=50SQCM: CPT

## 2024-09-23 ASSESSMENT — PAIN SCALES - GENERAL: PAINLEVEL: NO PAIN (0)

## 2024-09-23 NOTE — PATIENT INSTRUCTIONS
*Wheelchairs are never guaranteed at the front door, if you have your own wheel chair or knee walker, please bring that with you to your appointment. Thank you for your understanding!*    Wound care supplies were not ordered or needed today.    Important lnstructions      WEIGHT BEARING STATUS: You are to remain NON WEIGHT BEARING on your left foot. NON WEIGHT BEARING MEANS NO PRESSURE ON YOUR FOOT OR HEEL AT ANY TIME FOR ANY REASON!    2. OFFLOADING DEVICE: Must use a A ROLLING KNEE WALKER at all times! (do not use affected foot to push wheelchair)    3. STABILIZATION DEVICE: Use a PRAFO BOOT . You will need to WEAR THIS AT ALL TIMES EVEN WHILE IN BED.     4. ELEVATE: Elevating your leg means laying with your head on a pillow and your foot ABOVE YOUR HEART.     5. DO NOT MOVE YOUR FOOT.  There is a risk of worsening the wound or incision. To give yourself a higher chance of healing, please DO NOT swing foot back and forth and wiggle foot/toes especially when inside a stabilization device. Limited movement is allowable with therapy as recommended by the doctor.     6. TAKE A PROTEIN SHAKE TWICE A DAY.  (For ex: Boost, Ensure, Glucerna)    7. KEEP YOUR WOUND DRY AT ALL TIMES    Use a shower bag or a cast cover to keep your foot/leg dry during showers. These can be purchased on "[employer] or any pharmacy.         Dressing Change lnstructions    Standard - Wound Vac Instructions    1. 2x weekly and as needed cleanse the area with NS    2. Pat dry    3. Apply Cavilon no sting barrier wipe to the skin surrounding the wound to protect from drainage/maceration    4. Apply drape around incision. Cut strip of drape and apply to skin if bridging is needed; plan this area in advance; should not be over bony prominence     5. Cut the foam to fit the area of the incision    6. Cut narrow strip of foam if bridging    7. Cover foam with drape to obtain air tight seal    8. Cut opening the size of a quarter for where the suction pad  will be applied    9. Apply Suction pad    10. 125mmHG suction continuous    [employer] [facility] can be reached at [phone], 24 hours a day 7 days a week     - Back up plan in place:     If the negative pressure wound therapy malfunctions or unable to maintain seal: dressing must be removed and reapplied within 2 hours of the incident. If unable to reapply negative pressure wound dressing, place Normal Saline moistened gauze in wound bed and cover with appropriate dressing to keep wound bed moist.  Change wet-to-moist dressing two times a day until healthcare staff can re-implement negative pressure therapy. Change canister at least weekly.  [employer] [facility] can be reached at [phone], 24 hours a day 7 days a week    Information on Vacuum-Assisted Closure of a Wound  Vacuum-assisted closure (VAC) of a wound is a type of treatment to help wounds heal. It s also known as negative pressure wound therapy. During the treatment, a device lowers air pressure on the wound. This can help the wound heal more quickly.  Understanding the wound VAC system  A wound VAC system has several parts. A foam or gauze dressing is put directly on the wound. The dressing is changed every 24 to 72 hours. An adhesive film covers and seals the dressing and wound. A drainage tube leads from under the adhesive film and connects to a portable vacuum pump. This pump removes air pressure over the wound. It may do this constantly. Or it may do it in cycles. During the treatment, you ll need to carry the portable pump everywhere you go.  Why wound VAC is used  You might need this therapy for a recent traumatic wound. Or you may need it for a chronic wound. This is a wound that does not heal the way it should over time. This can happen with wounds in people who have diabetes. You may need a wound VAC if you ve had a recent skin graft. And you may need a wound VAC for a large wound. Large wounds can take a longer time to heal.  A wound  vacuum system may help your wound heal more quickly by:  Draining extra fluid from the wound  Reducing swelling  Reducing bacteria in the wound  Keeping your wound moist and warm  Helping draw together wound edges  Increasing blood flow to your wound  Decreasing inflammation  Wound VAC offers some other advantages over other types of wound care. It may decrease your overall discomfort. The dressings usually need to be changed less often. And they may be easier to keep in position.             SEEK MEDICAL CARE IF:  You have an increase in swelling, pain, or redness around the wound.  You have an increase in the amount of pus coming from the wound.  There is a bad smell coming from the wound.  The wound appears to be worsening/enlarging  You have a fever greater than 101.5 F      It is ok to continue current wound care treatment/products for the next 2-3 days until new wound care supplies are ordered and arrive. If longer than this please contact our office at [phone].      We want to hear from you!   In the next few weeks, you should receive a call or email to complete a survey about your visit at [facility]. Please help us improve your appointment experience by letting us know how we did today. We strive to make your experience good and value any ways in which we could do better.      We value your input and suggestions.    Thank you for choosing the [facility]!   Rifampin Pregnancy And Lactation Text: This medication is Pregnancy Category C and it isn't know if it is safe during pregnancy. It is also excreted in breast milk and should not be used if you are breast feeding.

## 2024-09-23 NOTE — PROGRESS NOTES
[facility]  -  Nurse Visit        Date of Service:  September 23, 2024     Requesting Provider: SILVIA Braswell    Diagnosis:     ICD-10-CM    1. Diabetic ulcer of toe of left foot associated with type 2 diabetes mellitus, with necrosis of muscle (H)  E11.621     L97.523           Chief Complaint: Petar is being seen today at [facility] [place] for his wound(s) dressing change. Reports pain of 0/10.  Denies any fevers, chills, or generalized ill feeling. Arrives using crutches continues to work as a .    Dressing on Arrival: Wound VAC running at 125mmhgs denies leaks.Upon removal of dressings moderate Serosanguinous drainage is noted.    New Wounds noted: No    Vital Signs: /74   Pulse 74   Temp 97.8  F (36.6  C)   Resp 12     Assessment:      General:  Patient presents to clinic in no apparent distress.   Psychiatric:  Alert and oriented x3.   Lower extremity:  edema is not present.    Integumentary:  Skin is maceration.    Circumferential volume measures:       No data to display                Wound info:  Negative Pressure Wound Therapy Toe (Comment  which one) Anterior;Left (Active)       VASC Wound Left hallux (Active)       VASC Wound L hallux (Active)   Pre Size Length 1.5 09/23/24 1100   Pre Size Width 1 09/23/24 1100   Pre Size Depth 0.4 09/23/24 1100   Pre Total Sq cm 1.5 09/23/24 1100   Product Used Negative pressure 09/23/24 1100       Incision/Surgical Site 08/11/24 Anterior;Left Toe (Comment  which one) (Active)       Undermining is present.    The periwoundskin is maceration      Plan:         1. Patient will return in 2 days for with Dr Braswell.            2. As listed below, treatment provided irrigation, mechanical cleansing, and dressings to promote autolytic debridement.             Cleansed with: soap and water and vashe    Protected skin with: 3M Cavilon    Dressings Applied to wound: Negative wound vac therapy:    Pieces  of foam removed from previous dressin/ Wax ring    Applied vac drape to the periwound skin to protect from the drainage and sponge; window pane fashion   Cut the black vac foam to fit the size of the wound. Used 1 piece(s) of black sponge total. Wax ring.  Applied vac drape to wherever the bridging is going to be (NEVER apply black sponge to the intact skin)   Covered with vac drape for air tight seal. Cut a hole the size of a quarter and applied suction pad. Be mindful of the direction of the tubing.  Connected tubing and turned vac machine to 125mmHG continuous suction.   No alarm or leaks noted.    Compression Applied to the right leg: None  Compression Applied to the left leg: None            Offloading used:  crutches    Trial Products: No    Provider notified regarding concerns: No    Treatment Changes: No    Tolerated Dressing Change:  Yes    Taught Regarding: follow up appointment(s), wound cares, offloading, compliance, and signs of infection    Educational Barriers: No barriers      Rad Burnette RN

## 2024-09-25 ENCOUNTER — OFFICE VISIT (OUTPATIENT)
Dept: VASCULAR SURGERY | Facility: CLINIC | Age: 54
End: 2024-09-25
Attending: PODIATRIST

## 2024-09-25 ENCOUNTER — TELEPHONE (OUTPATIENT)
Dept: VASCULAR SURGERY | Facility: CLINIC | Age: 54
End: 2024-09-25

## 2024-09-25 VITALS — TEMPERATURE: 98.7 F | RESPIRATION RATE: 20 BRPM | SYSTOLIC BLOOD PRESSURE: 118 MMHG | DIASTOLIC BLOOD PRESSURE: 66 MMHG

## 2024-09-25 DIAGNOSIS — L97.523 DIABETIC ULCER OF TOE OF LEFT FOOT ASSOCIATED WITH TYPE 2 DIABETES MELLITUS, WITH NECROSIS OF MUSCLE (H): Primary | ICD-10-CM

## 2024-09-25 DIAGNOSIS — E11.621 DIABETIC ULCER OF TOE OF LEFT FOOT ASSOCIATED WITH TYPE 2 DIABETES MELLITUS, WITH NECROSIS OF MUSCLE (H): Primary | ICD-10-CM

## 2024-09-25 PROCEDURE — 11043 DBRDMT MUSC&/FSCA 1ST 20/<: CPT | Performed by: PODIATRIST

## 2024-09-25 PROCEDURE — 97605 NEG PRS WND THER DME<=50SQCM: CPT | Mod: LT | Performed by: PODIATRIST

## 2024-09-25 ASSESSMENT — PAIN SCALES - GENERAL: PAINLEVEL: MODERATE PAIN (4)

## 2024-09-25 NOTE — TELEPHONE ENCOUNTER
Simon with [employer] calling to determine if fax was received for pt. It needs to completed by 9/29 as that is when prescription is due. Form has been signed and will be faxed.

## 2024-09-25 NOTE — PROGRESS NOTES
FOOT AND ANKLE SURGERY/PODIATRY Progress Note      ASSESSMENT:   Acute osteomyelitis left hallux  Diabetic ulceration left hallux into muscle         TREATMENT:  -I discussed the patient that the overall presentation of the left hallux ulceration has increased granulation tissue from previous visit.  I recommend continued use of the wound VAC due to increased depth and continue nonweightbearing on the left foot.    -Reviewed importance of good glycemic control.    -After discussion of risk factors, nursing staff removed dressing, cleansed wound and consent obtained 2% Lidocaine HCL jelly was applied, under clean conditions, the left hallux ulcer ulceration(s) were debrided using currette.  Devitalized and nonviable tissue, along with any fibrin and slough, was removed to improve granulation tissue formation, stimulate wound healing, decrease overall bacteria load, disrupt biofilm formation and decrease edge senescence. Wound drainage was scant No. Total excisional debridement was 2 sq cm into the muscle/fascia with a depth of 0.4 cm.   Ulcers were improved afterwards and .  Measures were as noted on the flow sheet.  Wound VAC applied to left hallux today.    -He will follow-up in 2 weeks    Abdirizak Braswell DPM  [facility]      HPI: Petar Fermin was seen again today for a left hallux ulceration.  He has remained mostly nonweightbearing on his left foot.  Denies new concerns.    Past Medical History:   Diagnosis Date    DM2 (diabetes mellitus, type 2) (H)        Past Surgical History:   Procedure Laterality Date    IRRIGATION AND DEBRIDEMENT FOOT, COMBINED Left 8/11/2024    Procedure: IRRIGATION AND DEBRIDEMENT, LEFT FOOT;  Surgeon: Prabhakar Waters DPM;  Location: [facility] [place]       No Known Allergies      Current Outpatient Medications:     amoxicillin-clavulanate (AUGMENTIN) 875-125 MG tablet, Take 1 tablet by mouth every 12 hours for 14 days., Disp: 28 tablet, Rfl: 0     glipiZIDE (GLUCOTROL XL) 10 MG 24 hr tablet, Take 1 tablet (10 mg) by mouth daily, Disp: 30 tablet, Rfl: 2    ibuprofen (ADVIL/MOTRIN) 200 MG tablet, Take 400 mg by mouth every 6 hours as needed for pain, Disp: , Rfl:     metFORMIN (GLUCOPHAGE XR) 500 MG 24 hr tablet, Take 2,000 mg by mouth daily (with dinner), Disp: , Rfl:     sulfamethoxazole-trimethoprim (BACTRIM DS) 800-160 MG tablet, Take 1 tablet by mouth 3 times daily for 14 days., Disp: 42 tablet, Rfl: 0    Review of Systems - 10 point Review of Systems is negative except for left hallux ulcer which is noted in HPI.      OBJECTIVE:  /66   Temp 98.7  F (37.1  C)   Resp 20   General appearance: Patient is alert and fully cooperative with history & exam.  No sign of distress is noted during the visit.    Vascular: Dorsalis pedis non-palpableLeft.  Dermatologic:    Negative Pressure Wound Therapy Toe (Comment  which one) Anterior;Left (Active)       VASC Wound Left hallux (Active)       VASC Wound L hallux (Active)   Pre Size Length 2 09/25/24 1400   Pre Size Width 1 09/25/24 1400   Pre Size Depth 0.4 09/25/24 1400   Pre Total Sq cm 2 09/25/24 1400   Product Used Negative pressure 09/23/24 1100       Incision/Surgical Site 08/11/24 Anterior;Left Toe (Comment  which one) (Active)   Granular base plantar left hallux ulceration slight increased depth at lateral margin, no erythema.  Neurologic: Diminished to light touch Left.  Musculoskeletal: Contracted digits noted Left.      Picture:

## 2024-09-25 NOTE — PROGRESS NOTES
NPWT applied- 1/4 barrier ring used, 1 sponge into wound incorporated with bridge. -125mmHg achieved.

## 2024-09-25 NOTE — PATIENT INSTRUCTIONS
*Wheelchairs are never guaranteed at the front door, if you have your own wheel chair or knee walker, please bring that with you to your appointment. Thank you for your understanding!*    Wound care supplies were not ordered or needed today.    Important lnstructions      WEIGHT BEARING STATUS: You are to remain NON WEIGHT BEARING on your left foot. NON WEIGHT BEARING MEANS NO PRESSURE ON YOUR FOOT OR HEEL AT ANY TIME FOR ANY REASON!    2. OFFLOADING DEVICE: Must use a A ROLLING KNEE WALKER at all times! (do not use affected foot to push wheelchair)    3. STABILIZATION DEVICE: Use a PRAFO BOOT . You will need to WEAR THIS AT ALL TIMES EVEN WHILE IN BED.     4. ELEVATE: Elevating your leg means laying with your head on a pillow and your foot ABOVE YOUR HEART.     5. DO NOT MOVE YOUR FOOT.  There is a risk of worsening the wound or incision. To give yourself a higher chance of healing, please DO NOT swing foot back and forth and wiggle foot/toes especially when inside a stabilization device. Limited movement is allowable with therapy as recommended by the doctor.     6. TAKE A PROTEIN SHAKE TWICE A DAY.  (For ex: Boost, Ensure, Glucerna)    7. KEEP YOUR WOUND DRY AT ALL TIMES    Use a shower bag or a cast cover to keep your foot/leg dry during showers. These can be purchased on [employer] or any pharmacy.         Dressing Change lnstructions    Standard - Wound Vac Instructions    1. 2x weekly and as needed cleanse the area with NS    2. Pat dry    3. Apply Cavilon no sting barrier wipe to the skin surrounding the wound to protect from drainage/maceration    4. Apply drape around incision. Cut strip of drape and apply to skin if bridging is needed; plan this area in advance; should not be over bony prominence     5. Cut the foam to fit the area of the incision    6. Cut narrow strip of foam if bridging    7. Cover foam with drape to obtain air tight seal    8. Cut opening the size of a quarter for where the suction pad  will be applied    9. Apply Suction pad    10. 125mmHG suction continuous    [employer] [facility] can be reached at [phone], 24 hours a day 7 days a week     - Back up plan in place:     If the negative pressure wound therapy malfunctions or unable to maintain seal: dressing must be removed and reapplied within 2 hours of the incident. If unable to reapply negative pressure wound dressing, place Normal Saline moistened gauze in wound bed and cover with appropriate dressing to keep wound bed moist.  Change wet-to-moist dressing two times a day until healthcare staff can re-implement negative pressure therapy. Change canister at least weekly.  [employer] [facility] can be reached at [phone], 24 hours a day 7 days a week    Information on Vacuum-Assisted Closure of a Wound  Vacuum-assisted closure (VAC) of a wound is a type of treatment to help wounds heal. It s also known as negative pressure wound therapy. During the treatment, a device lowers air pressure on the wound. This can help the wound heal more quickly.  Understanding the wound VAC system  A wound VAC system has several parts. A foam or gauze dressing is put directly on the wound. The dressing is changed every 24 to 72 hours. An adhesive film covers and seals the dressing and wound. A drainage tube leads from under the adhesive film and connects to a portable vacuum pump. This pump removes air pressure over the wound. It may do this constantly. Or it may do it in cycles. During the treatment, you ll need to carry the portable pump everywhere you go.  Why wound VAC is used  You might need this therapy for a recent traumatic wound. Or you may need it for a chronic wound. This is a wound that does not heal the way it should over time. This can happen with wounds in people who have diabetes. You may need a wound VAC if you ve had a recent skin graft. And you may need a wound VAC for a large wound. Large wounds can take a longer time to heal.  A wound  vacuum system may help your wound heal more quickly by:  Draining extra fluid from the wound  Reducing swelling  Reducing bacteria in the wound  Keeping your wound moist and warm  Helping draw together wound edges  Increasing blood flow to your wound  Decreasing inflammation  Wound VAC offers some other advantages over other types of wound care. It may decrease your overall discomfort. The dressings usually need to be changed less often. And they may be easier to keep in position.             SEEK MEDICAL CARE IF:  You have an increase in swelling, pain, or redness around the wound.  You have an increase in the amount of pus coming from the wound.  There is a bad smell coming from the wound.  The wound appears to be worsening/enlarging  You have a fever greater than 101.5 F      It is ok to continue current wound care treatment/products for the next 2-3 days until new wound care supplies are ordered and arrive. If longer than this please contact our office at [phone].      We want to hear from you!   In the next few weeks, you should receive a call or email to complete a survey about your visit at [facility]. Please help us improve your appointment experience by letting us know how we did today. We strive to make your experience good and value any ways in which we could do better.      We value your input and suggestions.    Thank you for choosing the [facility]!

## 2024-09-30 ENCOUNTER — OFFICE VISIT (OUTPATIENT)
Dept: VASCULAR SURGERY | Facility: CLINIC | Age: 54
End: 2024-09-30
Attending: PODIATRIST

## 2024-09-30 VITALS
TEMPERATURE: 98.4 F | RESPIRATION RATE: 16 BRPM | DIASTOLIC BLOOD PRESSURE: 67 MMHG | HEART RATE: 78 BPM | OXYGEN SATURATION: 97 % | SYSTOLIC BLOOD PRESSURE: 109 MMHG

## 2024-09-30 DIAGNOSIS — E11.621 DIABETIC ULCER OF TOE OF LEFT FOOT ASSOCIATED WITH TYPE 2 DIABETES MELLITUS, WITH NECROSIS OF MUSCLE (H): Primary | ICD-10-CM

## 2024-09-30 DIAGNOSIS — L97.523 DIABETIC ULCER OF TOE OF LEFT FOOT ASSOCIATED WITH TYPE 2 DIABETES MELLITUS, WITH NECROSIS OF MUSCLE (H): Primary | ICD-10-CM

## 2024-09-30 PROCEDURE — 97607 NEG PRS WND THR NDME<=50SQCM: CPT

## 2024-09-30 ASSESSMENT — PAIN SCALES - GENERAL: PAINLEVEL: NO PAIN (0)

## 2024-09-30 NOTE — PATIENT INSTRUCTIONS
*Wheelchairs are never guaranteed at the front door, if you have your own wheel chair or knee walker, please bring that with you to your appointment. Thank you for your understanding!*    Wound care supplies were not ordered or needed today.    Important lnstructions      WEIGHT BEARING STATUS: You are to remain NON WEIGHT BEARING on your left foot. NON WEIGHT BEARING MEANS NO PRESSURE ON YOUR FOOT OR HEEL AT ANY TIME FOR ANY REASON!    2. OFFLOADING DEVICE: Must use a A ROLLING KNEE WALKER at all times! (do not use affected foot to push wheelchair)    3. STABILIZATION DEVICE: Use a PRAFO BOOT . You will need to WEAR THIS AT ALL TIMES EVEN WHILE IN BED.     4. ELEVATE: Elevating your leg means laying with your head on a pillow and your foot ABOVE YOUR HEART.     5. DO NOT MOVE YOUR FOOT.  There is a risk of worsening the wound or incision. To give yourself a higher chance of healing, please DO NOT swing foot back and forth and wiggle foot/toes especially when inside a stabilization device. Limited movement is allowable with therapy as recommended by the doctor.     6. TAKE A PROTEIN SHAKE TWICE A DAY.  (For ex: Boost, Ensure, Glucerna)    7. KEEP YOUR WOUND DRY AT ALL TIMES    Use a shower bag or a cast cover to keep your foot/leg dry during showers. These can be purchased on [employer] or any pharmacy.         Dressing Change lnstructions    Standard - Wound Vac Instructions    1. 2x weekly and as needed cleanse the area with NS    2. Pat dry    3. Apply Cavilon no sting barrier wipe to the skin surrounding the wound to protect from drainage/maceration    4. Apply drape around incision. Cut strip of drape and apply to skin if bridging is needed; plan this area in advance; should not be over bony prominence     5. Cut the foam to fit the area of the incision    6. Cut narrow strip of foam if bridging    7. Cover foam with drape to obtain air tight seal    8. Cut opening the size of a quarter for where the suction pad  will be applied    9. Apply Suction pad    10. 125mmHG suction continuous    [employer] [facility] can be reached at [phone], 24 hours a day 7 days a week     - Back up plan in place:     If the negative pressure wound therapy malfunctions or unable to maintain seal: dressing must be removed and reapplied within 2 hours of the incident. If unable to reapply negative pressure wound dressing, place Normal Saline moistened gauze in wound bed and cover with appropriate dressing to keep wound bed moist.  Change wet-to-moist dressing two times a day until healthcare staff can re-implement negative pressure therapy. Change canister at least weekly.  [employer] [facility] can be reached at [phone], 24 hours a day 7 days a week    Information on Vacuum-Assisted Closure of a Wound  Vacuum-assisted closure (VAC) of a wound is a type of treatment to help wounds heal. It s also known as negative pressure wound therapy. During the treatment, a device lowers air pressure on the wound. This can help the wound heal more quickly.  Understanding the wound VAC system  A wound VAC system has several parts. A foam or gauze dressing is put directly on the wound. The dressing is changed every 24 to 72 hours. An adhesive film covers and seals the dressing and wound. A drainage tube leads from under the adhesive film and connects to a portable vacuum pump. This pump removes air pressure over the wound. It may do this constantly. Or it may do it in cycles. During the treatment, you ll need to carry the portable pump everywhere you go.  Why wound VAC is used  You might need this therapy for a recent traumatic wound. Or you may need it for a chronic wound. This is a wound that does not heal the way it should over time. This can happen with wounds in people who have diabetes. You may need a wound VAC if you ve had a recent skin graft. And you may need a wound VAC for a large wound. Large wounds can take a longer time to heal.  A wound  vacuum system may help your wound heal more quickly by:  Draining extra fluid from the wound  Reducing swelling  Reducing bacteria in the wound  Keeping your wound moist and warm  Helping draw together wound edges  Increasing blood flow to your wound  Decreasing inflammation  Wound VAC offers some other advantages over other types of wound care. It may decrease your overall discomfort. The dressings usually need to be changed less often. And they may be easier to keep in position.             SEEK MEDICAL CARE IF:  You have an increase in swelling, pain, or redness around the wound.  You have an increase in the amount of pus coming from the wound.  There is a bad smell coming from the wound.  The wound appears to be worsening/enlarging  You have a fever greater than 101.5 F      It is ok to continue current wound care treatment/products for the next 2-3 days until new wound care supplies are ordered and arrive. If longer than this please contact our office at [phone].      We want to hear from you!   In the next few weeks, you should receive a call or email to complete a survey about your visit at [facility]. Please help us improve your appointment experience by letting us know how we did today. We strive to make your experience good and value any ways in which we could do better.      We value your input and suggestions.    Thank you for choosing the [facility]!

## 2024-09-30 NOTE — PROGRESS NOTES
[facility]  -  Nurse Visit        Date of Service:  2024     Requesting Provider: SILVIA Braswell    Diagnosis:     ICD-10-CM    1. Diabetic ulcer of toe of left foot associated with type 2 diabetes mellitus, with necrosis of muscle (H)  E11.621     L97.523           Chief Complaint: Petar is being seen today at [facility] [place] for his wound(s) dressing change. Reports pain of 0.  Denies any fevers, chills, or generalized ill feeling.     Dressing on Arrival: NPWT, Pt is currently using no compression.  Upon removal of dressings scant Serosanguinous drainage is noted.    New Wounds noted: No    Vital Signs: /67   Pulse 78   Temp 98.4  F (36.9  C)   Resp 16   SpO2 97%     Assessment:      General:  Patient presents to clinic in no apparent distress.   Psychiatric:  Alert and oriented x3.   Lower extremity:  edema is not present.    Integumentary:  Skin is intact        Wound info:  Negative Pressure Wound Therapy Toe (Comment  which one) Anterior;Left (Active)       VASC Wound Left hallux (Active)       VASC Wound L hallux (Active)   Pre Size Length 1.6 24 1400   Pre Size Width 0.7 24 1400   Pre Size Depth 0.3 24 1400   Pre Total Sq cm 1.12 24 1400   Product Used Negative pressure 24 1100       Incision/Surgical Site 24 Anterior;Left Toe (Comment  which one) (Active)       Undermining is not present.    The periwoundskin is  macerated      Plan:         1. Patient will return in 3 days for nurse visit.            2. As listed below, treatment provided irrigation, mechanical cleansing, and dressings to promote autolytic debridement.             Cleansed with: vashe    Protected skin with: 3M Cavilon    Dressings Applied to wound: Negative wound vac therapy:    Pieces of foam removed from previous dressin (including bridge)    Applied vac drape to the periwound skin to protect from the drainage and sponge;  window pane fashion   Cut the black vac foam to fit the size of the wound. Used 1 (including bridge) piece(s) of black sponge total.   Applied vac drape to wherever the bridging is going to be (NEVER apply black sponge to the intact skin)   Covered with vac drape for air tight seal. Cut a hole the size of a quarter and applied suction pad. Be mindful of the direction of the tubing.  Connected tubing and turned vac machine to 125mmHG continuous suction.   No alarm or leaks noted.    Compression Applied to the right leg: None  Compression Applied to the left leg: None    Offloading used:  crutches    Trial Products: No    Provider notified regarding concerns: No    Treatment Changes: No    Tolerated Dressing Change:  Yes    Taught Regarding: follow up appointment(s) and wound cares    Educational Barriers: No barriers      GARRICK MARI RN CWOCN, CFCN

## 2024-10-02 NOTE — PATIENT INSTRUCTIONS
4barrier ring*Wheelchairs are never guaranteed at the front door, if you have your own wheel chair or knee walker, please bring that with you to your appointment. Thank you for your understanding!*     Wound care supplies were not ordered or needed today.     Important lnstructions        WEIGHT BEARING STATUS: You are to remain NON WEIGHT BEARING on your left foot. NON WEIGHT BEARING MEANS NO PRESSURE ON YOUR FOOT OR HEEL AT ANY TIME FOR ANY REASON!     2. OFFLOADING DEVICE: Must use a A ROLLING KNEE WALKER at all times! (do not use affected foot to push wheelchair)     3. STABILIZATION DEVICE: Use a PRAFO BOOT . You will need to WEAR THIS AT ALL TIMES EVEN WHILE IN BED.      4. ELEVATE: Elevating your leg means laying with your head on a pillow and your foot ABOVE YOUR HEART.      5. DO NOT MOVE YOUR FOOT.  There is a risk of worsening the wound or incision. To give yourself a higher chance of healing, please DO NOT swing foot back and forth and wiggle foot/toes especially when inside a stabilization device. Limited movement is allowable with therapy as recommended by the doctor.      6. TAKE A PROTEIN SHAKE TWICE A DAY.  (For ex: Boost, Ensure, Glucerna)     7. KEEP YOUR WOUND DRY AT ALL TIMES    Use a shower bag or a cast cover to keep your foot/leg dry during showers. These can be purchased on [employer] or any pharmacy.           Dressing Change lnstructions     Standard - Wound Vac Instructions     1. 2x weekly and as needed cleanse the area with NS    2. Pat dry    3. Apply Cavilon no sting barrier wipe to the skin surrounding the wound to protect from drainage/maceration    4. Apply drape around incision. Cut strip of drape and apply to skin if bridging is needed; plan this area in advance; should not be over bony prominence     5. Cut the foam to fit the area of the incision    6. Cut narrow strip of foam if bridging     7. Cover foam with drape to obtain air tight seal    8. Cut opening the size of a quarter  for where the suction pad will be applied    9. Apply Suction pad    10. 125mmHG suction continuous     [employer] [facility] can be reached at [phone], 24 hours a day 7 days a week      - Back up plan in place:      If the negative pressure wound therapy malfunctions or unable to maintain seal: dressing must be removed and reapplied within 2 hours of the incident. If unable to reapply negative pressure wound dressing, place Normal Saline moistened gauze in wound bed and cover with appropriate dressing to keep wound bed moist.  Change wet-to-moist dressing two times a day until healthcare staff can re-implement negative pressure therapy. Change canister at least weekly.  [employer] [facility] can be reached at [phone], 24 hours a day 7 days a week     Information on Vacuum-Assisted Closure of a Wound  Vacuum-assisted closure (VAC) of a wound is a type of treatment to help wounds heal. It s also known as negative pressure wound therapy. During the treatment, a device lowers air pressure on the wound. This can help the wound heal more quickly.  Understanding the wound VAC system  A wound VAC system has several parts. A foam or gauze dressing is put directly on the wound. The dressing is changed every 24 to 72 hours. An adhesive film covers and seals the dressing and wound. A drainage tube leads from under the adhesive film and connects to a portable vacuum pump. This pump removes air pressure over the wound. It may do this constantly. Or it may do it in cycles. During the treatment, you ll need to carry the portable pump everywhere you go.  Why wound VAC is used  You might need this therapy for a recent traumatic wound. Or you may need it for a chronic wound. This is a wound that does not heal the way it should over time. This can happen with wounds in people who have diabetes. You may need a wound VAC if you ve had a recent skin graft. And you may need a wound VAC for a large wound. Large wounds can take a  longer time to heal.  A wound vacuum system may help your wound heal more quickly by:  Draining extra fluid from the wound  Reducing swelling  Reducing bacteria in the wound  Keeping your wound moist and warm  Helping draw together wound edges  Increasing blood flow to your wound  Decreasing inflammation  Wound VAC offers some other advantages over other types of wound care. It may decrease your overall discomfort. The dressings usually need to be changed less often. And they may be easier to keep in position.                 SEEK MEDICAL CARE IF:  You have an increase in swelling, pain, or redness around the wound.  You have an increase in the amount of pus coming from the wound.  There is a bad smell coming from the wound.  The wound appears to be worsening/enlarging  You have a fever greater than 101.5 F        It is ok to continue current wound care treatment/products for the next 2-3 days until new wound care supplies are ordered and arrive. If longer than this please contact our office at [phone].      We want to hear from you!   In the next few weeks, you should receive a call or email to complete a survey about your visit at [facility]. Please help us improve your appointment experience by letting us know how we did today. We strive to make your experience good and value any ways in which we could do better.       We value your input and suggestions.     Thank you for choosing the [facility]!

## 2024-10-03 ENCOUNTER — OFFICE VISIT (OUTPATIENT)
Dept: VASCULAR SURGERY | Facility: CLINIC | Age: 54
End: 2024-10-03
Attending: PODIATRIST

## 2024-10-03 VITALS
TEMPERATURE: 98.3 F | HEART RATE: 80 BPM | OXYGEN SATURATION: 97 % | RESPIRATION RATE: 12 BRPM | DIASTOLIC BLOOD PRESSURE: 76 MMHG | SYSTOLIC BLOOD PRESSURE: 136 MMHG

## 2024-10-03 DIAGNOSIS — L97.523 DIABETIC ULCER OF TOE OF LEFT FOOT ASSOCIATED WITH TYPE 2 DIABETES MELLITUS, WITH NECROSIS OF MUSCLE (H): Primary | ICD-10-CM

## 2024-10-03 DIAGNOSIS — E11.621 DIABETIC ULCER OF TOE OF LEFT FOOT ASSOCIATED WITH TYPE 2 DIABETES MELLITUS, WITH NECROSIS OF MUSCLE (H): Primary | ICD-10-CM

## 2024-10-03 PROCEDURE — 97607 NEG PRS WND THR NDME<=50SQCM: CPT

## 2024-10-03 ASSESSMENT — PAIN SCALES - GENERAL: PAINLEVEL: NO PAIN (0)

## 2024-10-03 NOTE — PROGRESS NOTES
[facility]  -  Nurse Visit        Date of Service:  October 3, 2024     Requesting Provider: SILVIA Braswell    Diagnosis:     ICD-10-CM    1. Diabetic ulcer of toe of left foot associated with type 2 diabetes mellitus, with necrosis of muscle (H)  E11.621     L97.523           Chief Complaint: Petar is being seen today at [facility] [place] for his wound(s) dressing change. Reports pain of 0 out of 10.  Denies any fevers, chills, or generalized ill feeling.     Dressing on Arrival: NPWT to left hallux. Upon removal of dressings light serosanguinous drainage is noted.    New Wounds noted: No    Vital Signs: /76   Pulse 80   Temp 98.3  F (36.8  C) (Oral)   Resp 12   SpO2 97%     Assessment:      General:  Patient presents to clinic in no apparent distress.   Psychiatric:  Alert and oriented x3.   Lower extremity:  edema is not present.    Integumentary:  Skin is dry    Circumferential volume measures:       No data to display                Wound info:  Negative Pressure Wound Therapy Toe (Comment  which one) Anterior;Left (Active)       VASC Wound Left hallux (Active)       VASC Wound L hallux (Active)   Pre Size Length 0.8 10/03/24 1300   Pre Size Width 0.6 10/03/24 1300   Pre Size Depth 0.6 10/03/24 1300   Pre Total Sq cm 1.12 24 1400   Product Used Negative pressure 24 1100       Incision/Surgical Site 24 Anterior;Left Toe (Comment  which one) (Active)       Undermining is not present.    The periwoundskin is  pink and macerated slightly      Plan:         1. Patient will return in 4 days for nurse visit.            2. As listed below, treatment provided irrigation, mechanical cleansing, and dressings to promote autolytic debridement.             Cleansed with: Normal saline    Protected skin with: 3M Cavilon    Dressings Applied to wound: Negative wound vac therapy:    Pieces of foam removed from previous dressin    Applied barrier  ring to the periwound skin to protect from the drainage and sponge; window pane fashion   Cut the black vac foam to fit the size of the wound. Used 1 piece(s) of black sponge total.   Applied vac drape to wherever the bridging is going to be (NEVER apply black sponge to the intact skin)   Covered with vac drape for air tight seal. Cut a hole the size of a quarter and applied suction pad. Be mindful of the direction of the tubing.  Connected tubing and turned vac machine to 125mmHG continuous suction.   No alarm or leaks noted.    Compression Applied to the right leg: None  Compression Applied to the left leg: None    Offloading used:  crutches    Trial Products: No    Provider notified regarding concerns: No    Treatment Changes: No    Tolerated Dressing Change:  Yes    Taught Regarding: follow up appointment(s), wound cares, elevation, offloading, compliance, and signs of infection    Educational Barriers: No barriers      AMBERLY IRENE RN

## 2024-10-07 ENCOUNTER — OFFICE VISIT (OUTPATIENT)
Dept: VASCULAR SURGERY | Facility: CLINIC | Age: 54
End: 2024-10-07
Attending: PODIATRIST

## 2024-10-07 VITALS
TEMPERATURE: 97.7 F | HEART RATE: 80 BPM | RESPIRATION RATE: 16 BRPM | SYSTOLIC BLOOD PRESSURE: 109 MMHG | OXYGEN SATURATION: 98 % | DIASTOLIC BLOOD PRESSURE: 65 MMHG

## 2024-10-07 DIAGNOSIS — E11.621 DIABETIC ULCER OF TOE OF LEFT FOOT ASSOCIATED WITH TYPE 2 DIABETES MELLITUS, WITH NECROSIS OF MUSCLE (H): Primary | ICD-10-CM

## 2024-10-07 DIAGNOSIS — L97.523 DIABETIC ULCER OF TOE OF LEFT FOOT ASSOCIATED WITH TYPE 2 DIABETES MELLITUS, WITH NECROSIS OF MUSCLE (H): Primary | ICD-10-CM

## 2024-10-07 PROCEDURE — 97607 NEG PRS WND THR NDME<=50SQCM: CPT

## 2024-10-07 ASSESSMENT — PAIN SCALES - GENERAL: PAINLEVEL: NO PAIN (0)

## 2024-10-07 NOTE — PATIENT INSTRUCTIONS
*Wheelchairs are never guaranteed at the front door, if you have your own wheel chair or knee walker, please bring that with you to your appointment. Thank you for your understanding!*    Wound care supplies were not ordered or needed today.    Important lnstructions      WEIGHT BEARING STATUS: You are to remain NON WEIGHT BEARING on your left foot. NON WEIGHT BEARING MEANS NO PRESSURE ON YOUR FOOT OR HEEL AT ANY TIME FOR ANY REASON!    2. OFFLOADING DEVICE: Must use a A ROLLING KNEE WALKER at all times! (do not use affected foot to push wheelchair)    3. STABILIZATION DEVICE: Use a PRAFO BOOT . You will need to WEAR THIS AT ALL TIMES EVEN WHILE IN BED.     4. ELEVATE: Elevating your leg means laying with your head on a pillow and your foot ABOVE YOUR HEART.     5. DO NOT MOVE YOUR FOOT.  There is a risk of worsening the wound or incision. To give yourself a higher chance of healing, please DO NOT swing foot back and forth and wiggle foot/toes especially when inside a stabilization device. Limited movement is allowable with therapy as recommended by the doctor.     6. TAKE A PROTEIN SHAKE TWICE A DAY.  (For ex: Boost, Ensure, Glucerna)    7. KEEP YOUR WOUND DRY AT ALL TIMES    Use a shower bag or a cast cover to keep your foot/leg dry during showers. These can be purchased on [employer] or any pharmacy.         Dressing Change lnstructions    Standard - Wound Vac Instructions    1. 2x weekly and as needed cleanse the area with NS    2. Pat dry    3. Apply Cavilon no sting barrier wipe to the skin surrounding the wound to protect from drainage/maceration    4. Apply drape around incision. Cut strip of drape and apply to skin if bridging is needed; plan this area in advance; should not be over bony prominence     5. Cut the foam to fit the area of the incision    6. Cut narrow strip of foam if bridging    7. Cover foam with drape to obtain air tight seal    8. Cut opening the size of a quarter for where the suction pad  will be applied    9. Apply Suction pad    10. 125mmHG suction continuous    [employer] can be reached at [phone], 24 hours a day 7 days a week     - Back up plan in place:     If the negative pressure wound therapy malfunctions or unable to maintain seal: dressing must be removed and reapplied within 2 hours of the incident. If unable to reapply negative pressure wound dressing, place Normal Saline moistened gauze in wound bed and cover with appropriate dressing to keep wound bed moist.  Change wet-to-moist dressing two times a day until healthcare staff can re-implement negative pressure therapy. Change canister at least weekly.  [employer] can be reached at [phone], 24 hours a day 7 days a week    Information on Vacuum-Assisted Closure of a Wound  Vacuum-assisted closure (VAC) of a wound is a type of treatment to help wounds heal. It s also known as negative pressure wound therapy. During the treatment, a device lowers air pressure on the wound. This can help the wound heal more quickly.  Understanding the wound VAC system  A wound VAC system has several parts. A foam or gauze dressing is put directly on the wound. The dressing is changed every 24 to 72 hours. An adhesive film covers and seals the dressing and wound. A drainage tube leads from under the adhesive film and connects to a portable vacuum pump. This pump removes air pressure over the wound. It may do this constantly. Or it may do it in cycles. During the treatment, you ll need to carry the portable pump everywhere you go.  Why wound VAC is used  You might need this therapy for a recent traumatic wound. Or you may need it for a chronic wound. This is a wound that does not heal the way it should over time. This can happen with wounds in people who have diabetes. You may need a wound VAC if you ve had a recent skin graft. And you may need a wound VAC for a large wound. Large wounds can take a longer time to heal.  A wound  vacuum system may help your wound heal more quickly by:  Draining extra fluid from the wound  Reducing swelling  Reducing bacteria in the wound  Keeping your wound moist and warm  Helping draw together wound edges  Increasing blood flow to your wound  Decreasing inflammation  Wound VAC offers some other advantages over other types of wound care. It may decrease your overall discomfort. The dressings usually need to be changed less often. And they may be easier to keep in position.             SEEK MEDICAL CARE IF:  You have an increase in swelling, pain, or redness around the wound.  You have an increase in the amount of pus coming from the wound.  There is a bad smell coming from the wound.  The wound appears to be worsening/enlarging  You have a fever greater than 101.5 F      It is ok to continue current wound care treatment/products for the next 2-3 days until new wound care supplies are ordered and arrive. If longer than this please contact our office at [phone].      We want to hear from you!   In the next few weeks, you should receive a call or email to complete a survey about your visit at [facility]. Please help us improve your appointment experience by letting us know how we did today. We strive to make your experience good and value any ways in which we could do better.      We value your input and suggestions.    Thank you for choosing the [facility]!

## 2024-10-07 NOTE — PROGRESS NOTES
[facility]  -  Nurse Visit        Date of Service:  2024     Requesting Provider: SILVIA Braswell    Diagnosis:     ICD-10-CM    1. Diabetic ulcer of toe of left foot associated with type 2 diabetes mellitus, with necrosis of muscle (H)  E11.621     L97.523           Chief Complaint: Petar is being seen today at [facility] [place] for his wound(s) dressing change. Reports pain of 0.  Denies any fevers, chills, or generalized ill feeling.     Dressing on Arrival: NPWT, Pt is currently using no compression.  Upon removal of dressings scant Serosanguinous drainage is noted.    New Wounds noted: No    Vital Signs: /65   Pulse 80   Temp 97.7  F (36.5  C)   Resp 16   SpO2 98%     Assessment:      General:  Patient presents to clinic in no apparent distress.   Psychiatric:  Alert and oriented x3.   Lower extremity:  edema is not present.    Integumentary:  Skin is intact      Wound info:  Negative Pressure Wound Therapy Toe (Comment  which one) Anterior;Left (Active)       VASC Wound Left hallux (Active)       VASC Wound L hallux (Active)   Pre Size Length 1.1 10/07/24 1300   Pre Size Width 0.5 10/07/24 1300   Pre Size Depth 0.2 10/07/24 1300   Pre Total Sq cm 0.55 10/07/24 1300   Product Used Negative pressure 24 1100       Incision/Surgical Site 24 Anterior;Left Toe (Comment  which one) (Active)       Undermining is not present.    The periwoundskin is  macerated      Plan:         1. Patient will return in 2 days for 2 week follow up .            2. As listed below, treatment provided irrigation, mechanical cleansing, and dressings to promote autolytic debridement.             Cleansed with: vashe    Protected skin with: 3M Cavilon    Dressings Applied to wound: Negative wound vac therapy:    Pieces of foam removed from previous dressin    Applied vac drape to the periwound skin to protect from the drainage and sponge; window pane  fashion   Cut the black vac foam to fit the size of the wound. Used 1 incorporated with bridge piece(s) of black sponge total.   Applied vac drape to wherever the bridging is going to be (NEVER apply black sponge to the intact skin)   Covered with vac drape for air tight seal. Cut a hole the size of a quarter and applied suction pad. Be mindful of the direction of the tubing.  Connected tubing and turned vac machine to 125mmHG continuous suction.   No alarm or leaks noted.    Compression Applied to the right leg: None  Compression Applied to the left leg: None    Offloading used:  crutches    Trial Products: No    Provider notified regarding concerns: No    Treatment Changes: No    Tolerated Dressing Change:  Yes    Taught Regarding: follow up appointment(s) and wound cares    Educational Barriers: No barriers      GARRICK MARI RN CWOCN, CFCN

## 2024-10-09 ENCOUNTER — OFFICE VISIT (OUTPATIENT)
Dept: VASCULAR SURGERY | Facility: CLINIC | Age: 54
End: 2024-10-09
Attending: PODIATRIST

## 2024-10-09 VITALS
HEART RATE: 78 BPM | DIASTOLIC BLOOD PRESSURE: 74 MMHG | SYSTOLIC BLOOD PRESSURE: 121 MMHG | RESPIRATION RATE: 16 BRPM | TEMPERATURE: 98.1 F | OXYGEN SATURATION: 99 %

## 2024-10-09 DIAGNOSIS — E11.621 DIABETIC ULCER OF TOE OF LEFT FOOT ASSOCIATED WITH TYPE 2 DIABETES MELLITUS, LIMITED TO BREAKDOWN OF SKIN (H): Primary | ICD-10-CM

## 2024-10-09 DIAGNOSIS — L97.521 DIABETIC ULCER OF TOE OF LEFT FOOT ASSOCIATED WITH TYPE 2 DIABETES MELLITUS, LIMITED TO BREAKDOWN OF SKIN (H): Primary | ICD-10-CM

## 2024-10-09 PROCEDURE — 11042 DBRDMT SUBQ TIS 1ST 20SQCM/<: CPT | Performed by: PODIATRIST

## 2024-10-09 ASSESSMENT — PAIN SCALES - GENERAL: PAINLEVEL: NO PAIN (0)

## 2024-10-09 NOTE — PATIENT INSTRUCTIONS
"*Wheelchairs are never guaranteed at the front door, if you have your own wheel chair or knee walker, please bring that with you to your appointment. Thank you for your understanding!*    Wound care supplies were not ordered or needed today.    Important lnstructions      WEIGHT BEARING STATUS: You are to remain NON WEIGHT BEARING on your left foot. NON WEIGHT BEARING MEANS NO PRESSURE ON YOUR FOOT OR HEEL AT ANY TIME FOR ANY REASON!    2. OFFLOADING DEVICE: Must use a A ROLLING KNEE WALKER at all times! (do not use affected foot to push wheelchair)    3. STABILIZATION DEVICE: Use a CAM BOOT . You will need to WEAR THIS ANYTIME YOU ARE UP AND OUT OF BED, IT IS OKAY TO REMOVE WHEN YOU ARE SLEEPING..     4. ELEVATE: Elevating your leg means laying with your head on a pillow and your foot ABOVE YOUR HEART.     5. DO NOT MOVE YOUR FOOT.  There is a risk of worsening the wound or incision. To give yourself a higher chance of healing, please DO NOT swing foot back and forth and wiggle foot/toes especially when inside a stabilization device. Limited movement is allowable with therapy as recommended by the doctor.     6. TAKE A PROTEIN SHAKE TWICE A DAY.  (For ex: Boost, Ensure, Glucerna)    7. KEEP YOUR WOUND DRY AT ALL TIMES    Use a shower bag or a cast cover to keep your foot/leg dry during showers. These can be purchased on [employer] or any pharmacy.         Dressing Change lnstructions    EVERY OTHER DAY and as needed, Cleanse your LEFT HALLUX  wound(s) with Normal saline.    Pat Dry with non-sterile gauze    Primary Dressing: Apply HYDROFERA BLUE READY into/onto the wounds    Secondary dressing: Cover with dry gauze    Secure with non-sterile roll gauze (4\" x 75\" roll) and tape (1\" roll tape) as needed; avoid adhesive directly on the skin         SEEK MEDICAL CARE IF:  You have an increase in swelling, pain, or redness around the wound.  You have an increase in the amount of pus coming from the wound.  There is a bad " smell coming from the wound.  The wound appears to be worsening/enlarging  You have a fever greater than 101.5 F      It is ok to continue current wound care treatment/products for the next 2-3 days until new wound care supplies are ordered and arrive. If longer than this please contact our office at [phone].      We want to hear from you!   In the next few weeks, you should receive a call or email to complete a survey about your visit at [facility]. Please help us improve your appointment experience by letting us know how we did today. We strive to make your experience good and value any ways in which we could do better.      We value your input and suggestions.    Thank you for choosing the [facility]!

## 2024-10-09 NOTE — PROGRESS NOTES
FOOT AND ANKLE SURGERY/PODIATRY Progress Note      ASSESSMENT:   Acute osteomyelitis left hallux  Diabetic ulceration left hallux into muscle         TREATMENT:  -Discussed the patient that the left hallux ulceration has considerably improved from previous visit overall depth.  I recommend we discontinue use of wound VAC and begin use of Hydrofera Blue with a gauze dressing.  Continue nonweightbearing left foot.    -After discussion of risk factors, nursing staff removed dressing, cleansed wound and consent obtained 2% Lidocaine HCL jelly was applied, under clean conditions, the left hallux ulceration(s) were debrided using currette.  Devitalized and nonviable tissue, along with any fibrin and slough, was removed to improve granulation tissue formation, stimulate wound healing, decrease overall bacteria load, disrupt biofilm formation and decrease edge senescence. Wound drainage was scant No. Total excisional debridement was 0.5 sq cm into the subcutaneous tissue with a depth of 0.2 cm.   Ulcers were improved afterwards and .  Measures were as noted on the flow sheet.  Hydrofera Blue with gauze dressing applied today which she will reapply as directed.    -He will follow-up in 3 weeks    Abdirizak Braswell DPM  [facility]      HPI: Petar Fermin was seen again today for a left hallux ulceration.  Doing well today.  He has remained nonweightbearing and is use the wound VAC as directed.    Past Medical History:   Diagnosis Date    DM2 (diabetes mellitus, type 2) (H)        Past Surgical History:   Procedure Laterality Date    IRRIGATION AND DEBRIDEMENT FOOT, COMBINED Left 8/11/2024    Procedure: IRRIGATION AND DEBRIDEMENT, LEFT FOOT;  Surgeon: Prabhakar Waters DPM;  Location: [facility] [place] OR       No Known Allergies      Current Outpatient Medications:     glipiZIDE (GLUCOTROL XL) 10 MG 24 hr tablet, Take 1 tablet (10 mg) by mouth daily, Disp: 30 tablet, Rfl: 2    ibuprofen  (ADVIL/MOTRIN) 200 MG tablet, Take 400 mg by mouth every 6 hours as needed for pain, Disp: , Rfl:     metFORMIN (GLUCOPHAGE XR) 500 MG 24 hr tablet, Take 2,000 mg by mouth daily (with dinner), Disp: , Rfl:     Review of Systems - 10 point Review of Systems is negative except for left hallux ulcer which is noted in HPI.      OBJECTIVE:  /74   Pulse 78   Temp 98.1  F (36.7  C)   Resp 16   SpO2 99%   General appearance: Patient is alert and fully cooperative with history & exam.  No sign of distress is noted during the visit.    Vascular: Dorsalis pedis palpableLeft.  Dermatologic:    Negative Pressure Wound Therapy Toe (Comment  which one) Anterior;Left (Active)       VASC Wound Left hallux (Active)       VASC Wound L hallux (Active)   Pre Size Length 1 10/09/24 1200   Pre Size Width 0.5 10/09/24 1200   Pre Size Depth 0.2 10/09/24 1200   Pre Total Sq cm 0.5 10/09/24 1200   Product Used Negative pressure 09/23/24 1100       Incision/Surgical Site 08/11/24 Anterior;Left Toe (Comment  which one) (Active)   Granular base left hallux ulceration no erythema.  Neurologic: Diminished to light touch Left.  Musculoskeletal: Contracted digits noted Left.      Picture:

## (undated) DEVICE — TRAY PREP DRY SKIN SCRUB 067

## (undated) DEVICE — SU ETHILON 3-0 PS-2 18" 1669H

## (undated) DEVICE — PREP POVIDONE-IODINE 10% SOLUTION 4OZ BOTTLE MDS093944

## (undated) DEVICE — BNDG KLING 3" 2232

## (undated) DEVICE — PREP POVIDONE-IODINE 7.5% SCRUB 4OZ BOTTLE MDS093945

## (undated) DEVICE — ESU PENCIL SMOKE EVAC W/ROCKER SWITCH 0703-047-000

## (undated) DEVICE — SOL WATER IRRIG 1000ML BOTTLE 2F7114

## (undated) DEVICE — NEEDLE HYPO 2" X 21GA 305129

## (undated) DEVICE — SOL NACL 0.9% IRRIG 1000ML BOTTLE 2F7124

## (undated) DEVICE — ESU GROUND PAD ADULT REM W/15' CORD E7507DB

## (undated) DEVICE — HOLDER LIMB VELCRO OR 0814-1533

## (undated) DEVICE — CUSTOM PACK LOWER EXTREMITY SOP5BLEHEA

## (undated) DEVICE — GOWN IMPERVIOUS BREATHABLE 2XL/XLONG

## (undated) DEVICE — DRSG KERLIX 4 1/2"X4YDS ROLL 6715

## (undated) DEVICE — DRAPE STERI NON-STERILE 17X11 1000 NSD

## (undated) DEVICE — DRSG GAUZE 4X4" TRAY 6939

## (undated) DEVICE — DRSG STERI STRIP 1/4X3" R1541

## (undated) DEVICE — SYR 10ML LL W/O NDL 302995

## (undated) DEVICE — NDL 25GA 1.5" 305127

## (undated) DEVICE — GLOVE BIOGEL PI ULTRATOUCH G SZ 8.5 42185